# Patient Record
Sex: MALE | Race: WHITE | Employment: OTHER | URBAN - METROPOLITAN AREA
[De-identification: names, ages, dates, MRNs, and addresses within clinical notes are randomized per-mention and may not be internally consistent; named-entity substitution may affect disease eponyms.]

---

## 2018-08-19 ENCOUNTER — HOSPITAL ENCOUNTER (INPATIENT)
Age: 63
LOS: 5 days | Discharge: HOME HEALTH | DRG: 380 | End: 2018-08-24
Attending: EMERGENCY MEDICINE | Admitting: INTERNAL MEDICINE
Payer: MEDICARE

## 2018-08-19 ENCOUNTER — APPOINTMENT (OUTPATIENT)
Dept: ULTRASOUND IMAGING | Age: 63
DRG: 380 | End: 2018-08-19
Attending: EMERGENCY MEDICINE
Payer: MEDICARE

## 2018-08-19 ENCOUNTER — APPOINTMENT (OUTPATIENT)
Dept: GENERAL RADIOLOGY | Age: 63
DRG: 380 | End: 2018-08-19
Attending: EMERGENCY MEDICINE
Payer: MEDICARE

## 2018-08-19 DIAGNOSIS — E86.0 DEHYDRATION: ICD-10-CM

## 2018-08-19 DIAGNOSIS — R11.10 ACUTE VOMITING: ICD-10-CM

## 2018-08-19 DIAGNOSIS — F19.10 POLYSUBSTANCE ABUSE (HCC): ICD-10-CM

## 2018-08-19 DIAGNOSIS — G93.41 ACUTE METABOLIC ENCEPHALOPATHY: ICD-10-CM

## 2018-08-19 DIAGNOSIS — E87.6 ACUTE HYPOKALEMIA: ICD-10-CM

## 2018-08-19 DIAGNOSIS — F10.930 ALCOHOL WITHDRAWAL SYNDROME WITHOUT COMPLICATION (HCC): Primary | ICD-10-CM

## 2018-08-19 DIAGNOSIS — E87.1 ACUTE HYPONATREMIA: ICD-10-CM

## 2018-08-19 PROBLEM — K92.2 GIB (GASTROINTESTINAL BLEEDING): Status: ACTIVE | Noted: 2018-08-19

## 2018-08-19 PROBLEM — R65.10 SIRS (SYSTEMIC INFLAMMATORY RESPONSE SYNDROME) (HCC): Status: ACTIVE | Noted: 2018-08-19

## 2018-08-19 LAB
ALBUMIN SERPL-MCNC: 3.9 G/DL (ref 3.5–5)
ALBUMIN/GLOB SERPL: 0.9 {RATIO} (ref 1.1–2.2)
ALP SERPL-CCNC: 91 U/L (ref 45–117)
ALT SERPL-CCNC: 31 U/L (ref 12–78)
AMPHET UR QL SCN: NEGATIVE
ANION GAP SERPL CALC-SCNC: 9 MMOL/L (ref 5–15)
APPEARANCE UR: CLEAR
AST SERPL-CCNC: 37 U/L (ref 15–37)
BACTERIA URNS QL MICRO: NEGATIVE /HPF
BARBITURATES UR QL SCN: NEGATIVE
BASOPHILS # BLD: 0 K/UL (ref 0–0.1)
BASOPHILS NFR BLD: 0 % (ref 0–1)
BENZODIAZ UR QL: NEGATIVE
BILIRUB SERPL-MCNC: 1.8 MG/DL (ref 0.2–1)
BILIRUB UR QL CFM: NEGATIVE
BUN SERPL-MCNC: 14 MG/DL (ref 6–20)
BUN/CREAT SERPL: 19 (ref 12–20)
CALCIUM SERPL-MCNC: 9.1 MG/DL (ref 8.5–10.1)
CANNABINOIDS UR QL SCN: POSITIVE
CHLORIDE SERPL-SCNC: 93 MMOL/L (ref 97–108)
CK MB CFR SERPL CALC: 1 % (ref 0–2.5)
CK MB SERPL-MCNC: 1.3 NG/ML (ref 5–25)
CK SERPL-CCNC: 127 U/L (ref 39–308)
CO2 SERPL-SCNC: 29 MMOL/L (ref 21–32)
COCAINE UR QL SCN: NEGATIVE
COLOR UR: ABNORMAL
CREAT SERPL-MCNC: 0.75 MG/DL (ref 0.7–1.3)
DIFFERENTIAL METHOD BLD: ABNORMAL
DRUG SCRN COMMENT,DRGCM: ABNORMAL
EOSINOPHIL # BLD: 0 K/UL (ref 0–0.4)
EOSINOPHIL NFR BLD: 0 % (ref 0–7)
EPITH CASTS URNS QL MICRO: ABNORMAL /LPF
ERYTHROCYTE [DISTWIDTH] IN BLOOD BY AUTOMATED COUNT: 11.8 % (ref 11.5–14.5)
ETHANOL SERPL-MCNC: <10 MG/DL
GLOBULIN SER CALC-MCNC: 4.2 G/DL (ref 2–4)
GLUCOSE SERPL-MCNC: 115 MG/DL (ref 65–100)
GLUCOSE UR STRIP.AUTO-MCNC: NEGATIVE MG/DL
HCT VFR BLD AUTO: 43.5 % (ref 36.6–50.3)
HGB BLD-MCNC: 15.9 G/DL (ref 12.1–17)
HGB UR QL STRIP: NEGATIVE
IMM GRANULOCYTES # BLD: 0.1 K/UL (ref 0–0.04)
IMM GRANULOCYTES NFR BLD AUTO: 1 % (ref 0–0.5)
KETONES UR QL STRIP.AUTO: 40 MG/DL
LACTATE SERPL-SCNC: 1 MMOL/L (ref 0.4–2)
LEUKOCYTE ESTERASE UR QL STRIP.AUTO: ABNORMAL
LIPASE SERPL-CCNC: 129 U/L (ref 73–393)
LYMPHOCYTES # BLD: 1 K/UL (ref 0.8–3.5)
LYMPHOCYTES NFR BLD: 6 % (ref 12–49)
MAGNESIUM SERPL-MCNC: 1.9 MG/DL (ref 1.6–2.4)
MCH RBC QN AUTO: 34.6 PG (ref 26–34)
MCHC RBC AUTO-ENTMCNC: 36.6 G/DL (ref 30–36.5)
MCV RBC AUTO: 94.6 FL (ref 80–99)
METHADONE UR QL: NEGATIVE
MONOCYTES # BLD: 1 K/UL (ref 0–1)
MONOCYTES NFR BLD: 6 % (ref 5–13)
NEUTS SEG # BLD: 14 K/UL (ref 1.8–8)
NEUTS SEG NFR BLD: 87 % (ref 32–75)
NITRITE UR QL STRIP.AUTO: NEGATIVE
NRBC # BLD: 0 K/UL (ref 0–0.01)
NRBC BLD-RTO: 0 PER 100 WBC
OPIATES UR QL: NEGATIVE
PCP UR QL: NEGATIVE
PH UR STRIP: 6 [PH] (ref 5–8)
PLATELET # BLD AUTO: 283 K/UL (ref 150–400)
PMV BLD AUTO: 10.2 FL (ref 8.9–12.9)
POTASSIUM SERPL-SCNC: 3 MMOL/L (ref 3.5–5.1)
PROT SERPL-MCNC: 8.1 G/DL (ref 6.4–8.2)
PROT UR STRIP-MCNC: 100 MG/DL
RBC # BLD AUTO: 4.6 M/UL (ref 4.1–5.7)
RBC #/AREA URNS HPF: ABNORMAL /HPF (ref 0–5)
SODIUM SERPL-SCNC: 131 MMOL/L (ref 136–145)
SP GR UR REFRACTOMETRY: 1.03 (ref 1–1.03)
TROPONIN I SERPL-MCNC: <0.05 NG/ML
UA: UC IF INDICATED,UAUC: ABNORMAL
UROBILINOGEN UR QL STRIP.AUTO: 1 EU/DL (ref 0.2–1)
WBC # BLD AUTO: 16.1 K/UL (ref 4.1–11.1)
WBC URNS QL MICRO: ABNORMAL /HPF (ref 0–4)

## 2018-08-19 PROCEDURE — 83690 ASSAY OF LIPASE: CPT | Performed by: EMERGENCY MEDICINE

## 2018-08-19 PROCEDURE — 74011000250 HC RX REV CODE- 250: Performed by: EMERGENCY MEDICINE

## 2018-08-19 PROCEDURE — 81001 URINALYSIS AUTO W/SCOPE: CPT | Performed by: EMERGENCY MEDICINE

## 2018-08-19 PROCEDURE — 74011250636 HC RX REV CODE- 250/636: Performed by: INTERNAL MEDICINE

## 2018-08-19 PROCEDURE — 74011250637 HC RX REV CODE- 250/637: Performed by: EMERGENCY MEDICINE

## 2018-08-19 PROCEDURE — 96375 TX/PRO/DX INJ NEW DRUG ADDON: CPT

## 2018-08-19 PROCEDURE — 87040 BLOOD CULTURE FOR BACTERIA: CPT | Performed by: INTERNAL MEDICINE

## 2018-08-19 PROCEDURE — 80307 DRUG TEST PRSMV CHEM ANLYZR: CPT | Performed by: EMERGENCY MEDICINE

## 2018-08-19 PROCEDURE — 96374 THER/PROPH/DIAG INJ IV PUSH: CPT

## 2018-08-19 PROCEDURE — 83605 ASSAY OF LACTIC ACID: CPT | Performed by: INTERNAL MEDICINE

## 2018-08-19 PROCEDURE — C9113 INJ PANTOPRAZOLE SODIUM, VIA: HCPCS | Performed by: INTERNAL MEDICINE

## 2018-08-19 PROCEDURE — 94761 N-INVAS EAR/PLS OXIMETRY MLT: CPT

## 2018-08-19 PROCEDURE — 99285 EMERGENCY DEPT VISIT HI MDM: CPT

## 2018-08-19 PROCEDURE — 65270000029 HC RM PRIVATE

## 2018-08-19 PROCEDURE — 36415 COLL VENOUS BLD VENIPUNCTURE: CPT | Performed by: EMERGENCY MEDICINE

## 2018-08-19 PROCEDURE — 74022 RADEX COMPL AQT ABD SERIES: CPT

## 2018-08-19 PROCEDURE — 96361 HYDRATE IV INFUSION ADD-ON: CPT

## 2018-08-19 PROCEDURE — 84484 ASSAY OF TROPONIN QUANT: CPT | Performed by: EMERGENCY MEDICINE

## 2018-08-19 PROCEDURE — 83735 ASSAY OF MAGNESIUM: CPT | Performed by: EMERGENCY MEDICINE

## 2018-08-19 PROCEDURE — 74011250636 HC RX REV CODE- 250/636: Performed by: EMERGENCY MEDICINE

## 2018-08-19 PROCEDURE — 82550 ASSAY OF CK (CPK): CPT | Performed by: EMERGENCY MEDICINE

## 2018-08-19 PROCEDURE — 76705 ECHO EXAM OF ABDOMEN: CPT

## 2018-08-19 PROCEDURE — 80053 COMPREHEN METABOLIC PANEL: CPT | Performed by: EMERGENCY MEDICINE

## 2018-08-19 PROCEDURE — 85025 COMPLETE CBC W/AUTO DIFF WBC: CPT | Performed by: EMERGENCY MEDICINE

## 2018-08-19 PROCEDURE — 93005 ELECTROCARDIOGRAM TRACING: CPT

## 2018-08-19 PROCEDURE — C9113 INJ PANTOPRAZOLE SODIUM, VIA: HCPCS | Performed by: EMERGENCY MEDICINE

## 2018-08-19 RX ORDER — ACETAMINOPHEN 325 MG/1
650 TABLET ORAL
Status: DISCONTINUED | OUTPATIENT
Start: 2018-08-19 | End: 2018-08-24 | Stop reason: HOSPADM

## 2018-08-19 RX ORDER — ONDANSETRON 2 MG/ML
4 INJECTION INTRAMUSCULAR; INTRAVENOUS
Status: DISCONTINUED | OUTPATIENT
Start: 2018-08-19 | End: 2018-08-24 | Stop reason: HOSPADM

## 2018-08-19 RX ORDER — LIDOCAINE HYDROCHLORIDE 20 MG/ML
15 SOLUTION OROPHARYNGEAL
Status: COMPLETED | OUTPATIENT
Start: 2018-08-19 | End: 2018-08-19

## 2018-08-19 RX ORDER — SODIUM CHLORIDE 0.9 % (FLUSH) 0.9 %
5-10 SYRINGE (ML) INJECTION AS NEEDED
Status: DISCONTINUED | OUTPATIENT
Start: 2018-08-19 | End: 2018-08-24 | Stop reason: HOSPADM

## 2018-08-19 RX ORDER — ONDANSETRON 2 MG/ML
4 INJECTION INTRAMUSCULAR; INTRAVENOUS
Status: COMPLETED | OUTPATIENT
Start: 2018-08-19 | End: 2018-08-19

## 2018-08-19 RX ORDER — SODIUM CHLORIDE 0.9 % (FLUSH) 0.9 %
5-10 SYRINGE (ML) INJECTION EVERY 8 HOURS
Status: DISCONTINUED | OUTPATIENT
Start: 2018-08-19 | End: 2018-08-24 | Stop reason: HOSPADM

## 2018-08-19 RX ORDER — LORAZEPAM 2 MG/ML
2 INJECTION INTRAMUSCULAR
Status: DISCONTINUED | OUTPATIENT
Start: 2018-08-19 | End: 2018-08-24 | Stop reason: HOSPADM

## 2018-08-19 RX ORDER — NICOTINE 7MG/24HR
1 PATCH, TRANSDERMAL 24 HOURS TRANSDERMAL
Status: DISCONTINUED | OUTPATIENT
Start: 2018-08-19 | End: 2018-08-24 | Stop reason: HOSPADM

## 2018-08-19 RX ORDER — LORAZEPAM 2 MG/ML
4 INJECTION INTRAMUSCULAR
Status: DISCONTINUED | OUTPATIENT
Start: 2018-08-19 | End: 2018-08-24 | Stop reason: HOSPADM

## 2018-08-19 RX ORDER — SODIUM CHLORIDE, SODIUM LACTATE, POTASSIUM CHLORIDE, CALCIUM CHLORIDE 600; 310; 30; 20 MG/100ML; MG/100ML; MG/100ML; MG/100ML
100 INJECTION, SOLUTION INTRAVENOUS CONTINUOUS
Status: DISCONTINUED | OUTPATIENT
Start: 2018-08-19 | End: 2018-08-19

## 2018-08-19 RX ORDER — LORAZEPAM 2 MG/ML
1 INJECTION INTRAMUSCULAR ONCE
Status: COMPLETED | OUTPATIENT
Start: 2018-08-19 | End: 2018-08-19

## 2018-08-19 RX ORDER — POTASSIUM CHLORIDE AND SODIUM CHLORIDE 900; 300 MG/100ML; MG/100ML
INJECTION, SOLUTION INTRAVENOUS CONTINUOUS
Status: DISCONTINUED | OUTPATIENT
Start: 2018-08-19 | End: 2018-08-23

## 2018-08-19 RX ADMIN — LORAZEPAM 2 MG: 2 INJECTION INTRAMUSCULAR; INTRAVENOUS at 23:01

## 2018-08-19 RX ADMIN — LORAZEPAM 2 MG: 2 INJECTION INTRAMUSCULAR; INTRAVENOUS at 21:36

## 2018-08-19 RX ADMIN — LORAZEPAM 1 MG: 2 INJECTION INTRAMUSCULAR; INTRAVENOUS at 16:36

## 2018-08-19 RX ADMIN — ONDANSETRON 4 MG: 2 INJECTION, SOLUTION INTRAMUSCULAR; INTRAVENOUS at 21:36

## 2018-08-19 RX ADMIN — FOLIC ACID: 5 INJECTION, SOLUTION INTRAMUSCULAR; INTRAVENOUS; SUBCUTANEOUS at 19:36

## 2018-08-19 RX ADMIN — PHENOL 1 SPRAY: 1.4 LIQUID ORAL at 16:44

## 2018-08-19 RX ADMIN — SODIUM CHLORIDE 40 MG: 9 INJECTION INTRAMUSCULAR; INTRAVENOUS; SUBCUTANEOUS at 16:39

## 2018-08-19 RX ADMIN — ONDANSETRON 4 MG: 2 INJECTION, SOLUTION INTRAMUSCULAR; INTRAVENOUS at 14:51

## 2018-08-19 RX ADMIN — SODIUM CHLORIDE AND POTASSIUM CHLORIDE: 9; 2.98 INJECTION, SOLUTION INTRAVENOUS at 19:44

## 2018-08-19 RX ADMIN — SODIUM CHLORIDE 1000 ML: 900 INJECTION, SOLUTION INTRAVENOUS at 14:46

## 2018-08-19 RX ADMIN — LIDOCAINE HYDROCHLORIDE 15 ML: 20 SOLUTION ORAL; TOPICAL at 15:55

## 2018-08-19 RX ADMIN — Medication 10 ML: at 21:36

## 2018-08-19 RX ADMIN — SODIUM CHLORIDE 40 MG: 9 INJECTION, SOLUTION INTRAMUSCULAR; INTRAVENOUS; SUBCUTANEOUS at 21:35

## 2018-08-19 RX ADMIN — LORAZEPAM 2 MG: 2 INJECTION INTRAMUSCULAR; INTRAVENOUS at 19:51

## 2018-08-19 RX ADMIN — Medication 10 ML: at 23:01

## 2018-08-19 NOTE — IP AVS SNAPSHOT
[address] [facility] 
[phone] Patient: Wayne Montaño Sr. MRN: [id number] JHOAN:[birth year] About your hospitalization You were admitted on:  August 19, 2018 You last received care in the:  [facility] 3 NEUROSCIENCE TELEMETRY You were discharged on:  August 24, 2018 Why you were hospitalized Your primary diagnosis was:  Gib (Gastrointestinal Bleeding) Your diagnoses also included:  Vomiting, Sirs (Systemic Inflammatory Response Syndrome) (Hcc) Follow-up Information Follow up With Details Comments Contact Info Madeline Duckworth, DO Go on 8/28/2018 Please follow up on August 28, 2018 at [address] [address] [place] [address] 
[phone] [facility]   home health nursing, PT and OT services  [phone] [facility] Office on [facility] [address] 
[phone] Sari Mercado MD Schedule an appointment as soon as possible for a visit 2 weeks [address] 
[phone] Discharge Orders None A check josiah indicates which time of day the medication should be taken. My Medications START taking these medications Instructions Each Dose to Equal  
 Morning Noon Evening Bedtime  
 nicotine 7 mg/24 hr  
Commonly known as:  Azell Sabins Your last dose was: Your next dose is:    
   
   
 1 Patch by TransDERmal route daily as needed for Other (nicotine withdrawal) for up to 30 days. 1 Patch  
    
   
   
   
  
 pantoprazole 40 mg tablet Commonly known as:  PROTONIX Your last dose was: Your next dose is: Take 1 Tab by mouth Before breakfast and dinner. 40 mg Thiamine Mononitrate 100 mg tablet Commonly known as:  B-1 Start taking on:  8/25/2018 Your last dose was: Your next dose is: Take 1 Tab by mouth daily. 100 mg CONTINUE taking these medications Instructions Each Dose to Equal  
 Morning Noon Evening Bedtime  
 multivitamin with iron tablet Your last dose was: Your next dose is: Take 1 Tab by mouth daily. 1 Tab Where to Get Your Medications Information on where to get these meds will be given to you by the nurse or doctor. ! Ask your nurse or doctor about these medications  
  nicotine 7 mg/24 hr  
 pantoprazole 40 mg tablet Thiamine Mononitrate 100 mg tablet Discharge Instructions Patient Discharge Instructions Pt Name  Ubaldo Steven Date of Birth 1955 Age  70 y.o. Medical Record Number  [id number] PCP Jen Wesley,  Admit date:  8/19/2018 @    [place] [address] Room Number  3120/01 Date of Discharge 8/24/2018 Admission Diagnoses:     GIB (gastrointestinal bleeding) Allergies Allergen Reactions  Codeine Other (comments) Cant remember reaction You were admitted to [address] for  GIB (gastrointestinal bleeding) YOUR OTHER MEDICAL DIAGNOSES INCLUDE (BUT NOT LIMITED TO ): 
Present on Admission: **None** DIET:  Low fat, Low cholesterol, soft diet Recommended activity: Activity as tolerated Follow up : Follow-up Information Follow up With Details Comments Contact Info Anish Avendaño,  Schedule an appointment as soon as possible for a visit within one week [address] [address] 
[phone] DO NOT DRINK AND DO NOT SMOKE!! Perez's Esophagus: Care Instructions Your Care Instructions The esophagus is the tube that connects the throat to the stomach. Food and liquids go through this tube. In Perez's esophagus, the cells that line the tube change.  This is usually because of gastroesophageal reflux disease (GERD). GERD causes acid from your stomach to back up into the esophagus. When you have Perez's esophagus, you are slightly more likely to get cancer of the esophagus. So regular testing is important to watch for signs of this cancer. You can treat GERD to control your symptoms and feel better. Follow-up care is a key part of your treatment and safety. Be sure to make and go to all appointments, and call your doctor if you are having problems. It's also a good idea to know your test results and keep a list of the medicines you take. How can you care for yourself at home? · Take your medicines exactly as prescribed. Call your doctor if you think you are having a problem with your medicine. · If you take over-the-counter medicine, such as antacids or acid reducers, follow all instructions on the label. If you use these medicines often, talk with your doctor. Be careful when you take over-the-counter antacid medicines. Many of these medicines have aspirin in them. Read the label to make sure that you are not taking more than the recommended dose. Too much aspirin can be harmful. · Do not smoke or chew tobacco. Smoking can make GERD worse. If you need help quitting, talk to your doctor about stop-smoking programs and medicines. These can increase your chances of quitting for good. · Chocolate, mint, and alcohol can make GERD worse. They relax the valve between the esophagus and the stomach. · Spicy foods, foods that have a lot of acid (like tomatoes and oranges), and coffee can make GERD symptoms worse in some people. If your symptoms are worse after you eat a certain food, you may want to stop eating that food to see if your symptoms get better. · Eat smaller meals, and more often. After eating, wait 2 to 3 hours before you lie down.  
· Raise the head of your bed 6 to 8 inches by putting blocks under the frame or a foam wedge under the head of the mattress. · Do not wear tight clothing around your midsection. · If you are overweight, lose weight. Even losing 5 to 10 pounds can help. When should you call for help? Call your doctor now or seek immediate medical care if: 
  · You have new or worse belly pain.  
  · You are vomiting.  
 Watch closely for changes in your health, and be sure to contact your doctor if: 
  · You have any pain or difficulty swallowing.  
  · You are losing weight.  
  · You have new or worse symptoms, such as heartburn.  
  · You do not get better as expected. Where can you learn more? Go to http://patricio-namrata.info/. Enter L146 in the search box to learn more about \"Perez's Esophagus: Care Instructions. \" Current as of: May 12, 2017 Content Version: 11.7 © 6262-7880 [employer]. Care instructions adapted under license by [employer] (which disclaims liability or warranty for this information). If you have questions about a medical condition or this instruction, always ask your healthcare professional. [address] any warranty or liability for your use of this information. · It is important that you take the medication exactly as they are prescribed. · Keep your medication in the bottles provided by the pharmacist and keep a list of the medication names, dosages, and times to be taken in your wallet. · Do not take other medications without consulting your doctor. ADDITIONAL INFORMATION: If you experience any of the following symptoms or have any health problem not listed below, then please call your primary care physician or return to the emergency room if you cannot get hold of your doctor: Fever, chills, nausea, vomiting, diarrhea, change in mentation, falling, bleeding, shortness of breath.  
 
 
I understand that if any problems occur once I am discharged, I am supposed to call my Primary care physician for further care or seek help in the Emergency Department at the nearest Healthcare facility. I have had an opportunity to discuss my clinical issues with my doctor and nursing staff. I understand and acknowledge receipt of the above instructions. Physician's or R.N.'s Signature                                                            Date/Time Patient or Representative Signature                                                 Date/Time [employer] Announcement We are excited to announce that we are making your provider's discharge notes available to you in GIVINGtrax. You will see these notes when they are completed and signed by the physician that discharged you from your recent hospital stay. If you have any questions or concerns about any information you see in GIVINGtrax, please call the Health Information Department where you were seen or reach out to your Primary Care Provider for more information about your plan of care. Introducing [facility]! [facility] [facility] introduces GIVINGtrax patient portal. Now you can access parts of your medical record, email your doctor's office, and request medication refills online. 1. In your internet browser, go to [email or web address] 2. Click on the First Time User? Click Here link in the Sign In box. You will see the New Member Sign Up page. 3. Enter your GIVING[employer]x Access Code exactly as it appears below. You will not need to use this code after youve completed the sign-up process. If you do not sign up before the expiration date, you must request a new code. · [employer] Access Code: [email or web address] Expires: 11/17/2018  2:19 PM 
 
4. Enter the last four digits of your Social Security Number (xxxx) and Date of Birth (mm/dd/yyyy) as indicated and click Submit. You will be taken to the next sign-up page. 5. Create a Ellevation ID. This will be your [employer] login ID and cannot be changed, so think of one that is secure and easy to remember. 6. Create a [employer] password. You can change your password at any time. 7. Enter your Password Reset Question and Answer. This can be used at a later time if you forget your password. 8. Enter your e-mail address. You will receive e-mail notification when new information is available in [address] 9. Click Sign Up. You can now view and download portions of your medical record. 10. Click the Download Summary menu link to download a portable copy of your medical information. If you have questions, please visit the Frequently Asked Questions section of the [employer] website. Remember, [employer] is NOT to be used for urgent needs. For medical emergencies, dial 911. Now available from your iPhone and Android! Introducing Nii Morocho As a [facility] patient, I wanted to make you aware of our electronic visit tool called Nii Morocho. [facility] 24/7 allows you to connect within minutes with a medical provider 24 hours a day, seven days a week via a mobile device or tablet or logging into a secure website from your computer. You can access Nii Morocho from anywhere in the [place]. A virtual visit might be right for you when you have a simple condition and feel like you just dont want to get out of bed, or cant get away from work for an appointment, when your regular [facility] provider is not available (evenings, weekends or holidays), or when youre out of town and need minor care.   Electronic visits cost only $49 and if the [facility] 24/7 provider determines a prescription is needed to treat your condition, one can be electronically transmitted to a nearby pharmacy*. Please take a moment to enroll today if you have not already done so. The enrollment process is free and takes just a few minutes. To enroll, please download the [employer] [facility] 24/7 luanne to your tablet or phone, or visit [email or web address] to enroll on your computer. And, as an [address] [place] [address] patient with a [employer] account, the results of your visits will be scanned into your electronic medical record and your primary care provider will be able to view the scanned results. We urge you to continue to see your regular [employer] provider for your ongoing medical care. And while your primary care provider may not be the one available when you seek a [employer] virtual visit, the peace of mind you get from getting a real diagnosis real time can be priceless. For more information on [employer], view our Frequently Asked Questions (FAQs) at [email or web address]. Sincerely, 
 
Breann Salazar MD 
Chief Medical Officer [address] [place] [address] *:  certain medications cannot be prescribed via [employer] Providers Seen During Your Hospitalization Provider Specialty Primary office phone Holli Kwong MD Emergency Medicine [phone] May Campbell MD Hospitalist [phone] Rylee Weir MD Hospitalist [phone] Your Primary Care Physician (PCP) Primary Care Physician Office Phone Office Fax Edu Alvarado [phone] You are allergic to the following Allergen Reactions Codeine Other (comments) Cant remember reaction Recent Documentation Height Weight BMI Smoking Status 1.778 m 74.6 kg 23.6 kg/m2 Former Smoker Emergency Contacts Name Discharge Info Relation Home Work Mobile Neri Snatos CAREGIVER [3] Friend [5] [phone] Pankaj Fall DISCHARGE CAREGIVER [3] Friend [5] [phone] Patient Belongings The following personal items are in your possession at time of discharge: 
  Dental Appliances:  (not brought to endoscopy with patient)  Visual Aid: None      Home Medications: None   Jewelry: None  Clothing: Sent home    Other Valuables: Sent home Please provide this summary of care documentation to your next provider. Signatures-by signing, you are acknowledging that this After Visit Summary has been reviewed with you and you have received a copy. Patient Signature:  ____________________________________________________________ Date:  ____________________________________________________________  
  
[facility] Provider Signature:  ____________________________________________________________ Date:  ____________________________________________________________

## 2018-08-19 NOTE — IP AVS SNAPSHOT
[address] [facility] 
[phone] Patient: Juan Gimenez Sr. MRN: [id number] GYE:[birth year] A check josiah indicates which time of day the medication should be taken. My Medications START taking these medications Instructions Each Dose to Equal  
 Morning Noon Evening Bedtime  
 nicotine 7 mg/24 hr  
Commonly known as:  Valdez Kilgore Your last dose was: Your next dose is:    
   
   
 1 Patch by TransDERmal route daily as needed for Other (nicotine withdrawal) for up to 30 days. 1 Patch  
    
   
   
   
  
 pantoprazole 40 mg tablet Commonly known as:  PROTONIX Your last dose was: Your next dose is: Take 1 Tab by mouth Before breakfast and dinner. 40 mg Thiamine Mononitrate 100 mg tablet Commonly known as:  B-1 Start taking on:  8/25/2018 Your last dose was: Your next dose is: Take 1 Tab by mouth daily. 100 mg CONTINUE taking these medications Instructions Each Dose to Equal  
 Morning Noon Evening Bedtime  
 multivitamin with iron tablet Your last dose was: Your next dose is: Take 1 Tab by mouth daily. 1 Tab Where to Get Your Medications Information on where to get these meds will be given to you by the nurse or doctor. ! Ask your nurse or doctor about these medications  
  nicotine 7 mg/24 hr  
 pantoprazole 40 mg tablet Thiamine Mononitrate 100 mg tablet
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
## 2018-08-19 NOTE — ED NOTES
Patient provided ice chips and requesting throat spray, called Dr Deion Elias and requested, iv fluids almost completed
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
## 2018-08-19 NOTE — ED NOTES
TRANSFER - IN REPORT:    Verbal report received from MICHAEL Salgado(name) on [employer] .  Report consisted of patients Situation, Background, Assessment and   Recommendations(SBAR). Information from the following report(s) SBAR and ED Summary was reviewed with the receiving nurse. Opportunity for questions and clarification was provided.

## 2018-08-19 NOTE — ED NOTES
Bedside shift change report given to Thor Brunner RN  (oncoming nurse) by Carlos Lyman RN  (offgoing nurse). Report included the following information SBAR, Kardex and ED Summary.

## 2018-08-20 LAB
ALBUMIN SERPL-MCNC: 2.7 G/DL (ref 3.5–5)
ALBUMIN/GLOB SERPL: 0.8 {RATIO} (ref 1.1–2.2)
ALP SERPL-CCNC: 63 U/L (ref 45–117)
ALT SERPL-CCNC: 26 U/L (ref 12–78)
ANION GAP SERPL CALC-SCNC: 7 MMOL/L (ref 5–15)
AST SERPL-CCNC: 46 U/L (ref 15–37)
ATRIAL RATE: 108 BPM
BASOPHILS # BLD: 0 K/UL (ref 0–0.1)
BASOPHILS NFR BLD: 0 % (ref 0–1)
BILIRUB SERPL-MCNC: 1.7 MG/DL (ref 0.2–1)
BUN SERPL-MCNC: 8 MG/DL (ref 6–20)
BUN/CREAT SERPL: 13 (ref 12–20)
CALCIUM SERPL-MCNC: 7.9 MG/DL (ref 8.5–10.1)
CALCULATED P AXIS, ECG09: 35 DEGREES
CALCULATED R AXIS, ECG10: -15 DEGREES
CALCULATED T AXIS, ECG11: 52 DEGREES
CHLORIDE SERPL-SCNC: 103 MMOL/L (ref 97–108)
CO2 SERPL-SCNC: 26 MMOL/L (ref 21–32)
CORTIS AM PEAK SERPL-MCNC: 16.9 UG/DL (ref 4.3–22.4)
CREAT SERPL-MCNC: 0.61 MG/DL (ref 0.7–1.3)
DIAGNOSIS, 93000: NORMAL
DIFFERENTIAL METHOD BLD: ABNORMAL
EOSINOPHIL # BLD: 0 K/UL (ref 0–0.4)
EOSINOPHIL NFR BLD: 0 % (ref 0–7)
ERYTHROCYTE [DISTWIDTH] IN BLOOD BY AUTOMATED COUNT: 11.9 % (ref 11.5–14.5)
EST. AVERAGE GLUCOSE BLD GHB EST-MCNC: NORMAL MG/DL
GLOBULIN SER CALC-MCNC: 3.5 G/DL (ref 2–4)
GLUCOSE SERPL-MCNC: 91 MG/DL (ref 65–100)
HBA1C MFR BLD: 4.4 % (ref 4.2–6.3)
HCT VFR BLD AUTO: 36.2 % (ref 36.6–50.3)
HGB BLD-MCNC: 12.9 G/DL (ref 12.1–17)
IMM GRANULOCYTES # BLD: 0 K/UL (ref 0–0.04)
IMM GRANULOCYTES NFR BLD AUTO: 0 % (ref 0–0.5)
INR PPP: 1 (ref 0.9–1.1)
LYMPHOCYTES # BLD: 1 K/UL (ref 0.8–3.5)
LYMPHOCYTES NFR BLD: 15 % (ref 12–49)
MAGNESIUM SERPL-MCNC: 1.9 MG/DL (ref 1.6–2.4)
MCH RBC QN AUTO: 34.7 PG (ref 26–34)
MCHC RBC AUTO-ENTMCNC: 35.6 G/DL (ref 30–36.5)
MCV RBC AUTO: 97.3 FL (ref 80–99)
MONOCYTES # BLD: 0.4 K/UL (ref 0–1)
MONOCYTES NFR BLD: 6 % (ref 5–13)
NEUTS SEG # BLD: 5.5 K/UL (ref 1.8–8)
NEUTS SEG NFR BLD: 79 % (ref 32–75)
NRBC # BLD: 0 K/UL (ref 0–0.01)
NRBC BLD-RTO: 0 PER 100 WBC
P-R INTERVAL, ECG05: 162 MS
PHOSPHATE SERPL-MCNC: 1.4 MG/DL (ref 2.6–4.7)
PLATELET # BLD AUTO: 153 K/UL (ref 150–400)
PMV BLD AUTO: 10.1 FL (ref 8.9–12.9)
POTASSIUM SERPL-SCNC: 3.9 MMOL/L (ref 3.5–5.1)
PROT SERPL-MCNC: 6.2 G/DL (ref 6.4–8.2)
PROTHROMBIN TIME: 10.3 SEC (ref 9–11.1)
Q-T INTERVAL, ECG07: 348 MS
QRS DURATION, ECG06: 80 MS
QTC CALCULATION (BEZET), ECG08: 466 MS
RBC # BLD AUTO: 3.72 M/UL (ref 4.1–5.7)
SODIUM SERPL-SCNC: 136 MMOL/L (ref 136–145)
T4 FREE SERPL-MCNC: 0.8 NG/DL (ref 0.8–1.5)
TSH SERPL DL<=0.05 MIU/L-ACNC: 1.9 UIU/ML (ref 0.36–3.74)
VENTRICULAR RATE, ECG03: 108 BPM
VIT B12 SERPL-MCNC: NORMAL PG/ML (ref 193–986)
WBC # BLD AUTO: 6.9 K/UL (ref 4.1–11.1)

## 2018-08-20 PROCEDURE — 74011250637 HC RX REV CODE- 250/637: Performed by: INTERNAL MEDICINE

## 2018-08-20 PROCEDURE — 80053 COMPREHEN METABOLIC PANEL: CPT | Performed by: INTERNAL MEDICINE

## 2018-08-20 PROCEDURE — 83036 HEMOGLOBIN GLYCOSYLATED A1C: CPT | Performed by: INTERNAL MEDICINE

## 2018-08-20 PROCEDURE — 74011000250 HC RX REV CODE- 250: Performed by: INTERNAL MEDICINE

## 2018-08-20 PROCEDURE — 82607 VITAMIN B-12: CPT | Performed by: INTERNAL MEDICINE

## 2018-08-20 PROCEDURE — 84439 ASSAY OF FREE THYROXINE: CPT | Performed by: INTERNAL MEDICINE

## 2018-08-20 PROCEDURE — 83735 ASSAY OF MAGNESIUM: CPT | Performed by: INTERNAL MEDICINE

## 2018-08-20 PROCEDURE — 84100 ASSAY OF PHOSPHORUS: CPT | Performed by: INTERNAL MEDICINE

## 2018-08-20 PROCEDURE — 36415 COLL VENOUS BLD VENIPUNCTURE: CPT | Performed by: INTERNAL MEDICINE

## 2018-08-20 PROCEDURE — 94760 N-INVAS EAR/PLS OXIMETRY 1: CPT

## 2018-08-20 PROCEDURE — 74011250636 HC RX REV CODE- 250/636: Performed by: INTERNAL MEDICINE

## 2018-08-20 PROCEDURE — 84443 ASSAY THYROID STIM HORMONE: CPT | Performed by: INTERNAL MEDICINE

## 2018-08-20 PROCEDURE — 77030013160 HC PROTCT ARM THMB DERY -A

## 2018-08-20 PROCEDURE — 85025 COMPLETE CBC W/AUTO DIFF WBC: CPT | Performed by: INTERNAL MEDICINE

## 2018-08-20 PROCEDURE — C9113 INJ PANTOPRAZOLE SODIUM, VIA: HCPCS | Performed by: INTERNAL MEDICINE

## 2018-08-20 PROCEDURE — 85610 PROTHROMBIN TIME: CPT | Performed by: PHYSICIAN ASSISTANT

## 2018-08-20 PROCEDURE — 82533 TOTAL CORTISOL: CPT | Performed by: INTERNAL MEDICINE

## 2018-08-20 PROCEDURE — 65660000000 HC RM CCU STEPDOWN

## 2018-08-20 RX ORDER — HYDRALAZINE HYDROCHLORIDE 20 MG/ML
10 INJECTION INTRAMUSCULAR; INTRAVENOUS
Status: DISCONTINUED | OUTPATIENT
Start: 2018-08-20 | End: 2018-08-24 | Stop reason: HOSPADM

## 2018-08-20 RX ADMIN — LORAZEPAM 4 MG: 2 INJECTION INTRAMUSCULAR; INTRAVENOUS at 00:39

## 2018-08-20 RX ADMIN — LORAZEPAM 4 MG: 2 INJECTION INTRAMUSCULAR; INTRAVENOUS at 13:55

## 2018-08-20 RX ADMIN — LORAZEPAM 4 MG: 2 INJECTION INTRAMUSCULAR; INTRAVENOUS at 11:24

## 2018-08-20 RX ADMIN — Medication 10 ML: at 00:39

## 2018-08-20 RX ADMIN — Medication 10 ML: at 17:15

## 2018-08-20 RX ADMIN — SODIUM PHOSPHATE, MONOBASIC, MONOHYDRATE AND SODIUM PHOSPHATE, DIBASIC ANHYDROUS: 276; 142 INJECTION, SOLUTION INTRAVENOUS at 11:46

## 2018-08-20 RX ADMIN — LORAZEPAM 2 MG: 2 INJECTION INTRAMUSCULAR; INTRAVENOUS at 21:58

## 2018-08-20 RX ADMIN — Medication 10 ML: at 21:52

## 2018-08-20 RX ADMIN — FOLIC ACID: 5 INJECTION, SOLUTION INTRAMUSCULAR; INTRAVENOUS; SUBCUTANEOUS at 11:29

## 2018-08-20 RX ADMIN — LORAZEPAM 4 MG: 2 INJECTION INTRAMUSCULAR; INTRAVENOUS at 06:48

## 2018-08-20 RX ADMIN — SODIUM CHLORIDE AND POTASSIUM CHLORIDE: 9; 2.98 INJECTION, SOLUTION INTRAVENOUS at 21:48

## 2018-08-20 RX ADMIN — Medication 10 ML: at 13:52

## 2018-08-20 RX ADMIN — LORAZEPAM 4 MG: 2 INJECTION INTRAMUSCULAR; INTRAVENOUS at 19:09

## 2018-08-20 RX ADMIN — LORAZEPAM 4 MG: 2 INJECTION INTRAMUSCULAR; INTRAVENOUS at 19:10

## 2018-08-20 RX ADMIN — ACETAMINOPHEN 650 MG: 325 TABLET ORAL at 11:31

## 2018-08-20 RX ADMIN — LORAZEPAM 4 MG: 2 INJECTION INTRAMUSCULAR; INTRAVENOUS at 12:22

## 2018-08-20 RX ADMIN — SODIUM CHLORIDE 40 MG: 9 INJECTION, SOLUTION INTRAMUSCULAR; INTRAVENOUS; SUBCUTANEOUS at 08:27

## 2018-08-20 RX ADMIN — LORAZEPAM 4 MG: 2 INJECTION INTRAMUSCULAR; INTRAVENOUS at 17:14

## 2018-08-20 RX ADMIN — LORAZEPAM 4 MG: 2 INJECTION INTRAMUSCULAR; INTRAVENOUS at 23:17

## 2018-08-20 RX ADMIN — ONDANSETRON 4 MG: 2 INJECTION, SOLUTION INTRAMUSCULAR; INTRAVENOUS at 13:55

## 2018-08-20 RX ADMIN — LORAZEPAM 4 MG: 2 INJECTION INTRAMUSCULAR; INTRAVENOUS at 08:23

## 2018-08-20 RX ADMIN — SODIUM CHLORIDE AND POTASSIUM CHLORIDE: 9; 2.98 INJECTION, SOLUTION INTRAVENOUS at 03:32

## 2018-08-20 RX ADMIN — SODIUM CHLORIDE 40 MG: 9 INJECTION, SOLUTION INTRAMUSCULAR; INTRAVENOUS; SUBCUTANEOUS at 21:48

## 2018-08-20 RX ADMIN — LORAZEPAM 4 MG: 2 INJECTION INTRAMUSCULAR; INTRAVENOUS at 03:39

## 2018-08-20 RX ADMIN — LORAZEPAM 4 MG: 2 INJECTION INTRAMUSCULAR; INTRAVENOUS at 15:58

## 2018-08-20 NOTE — PROGRESS NOTES
0730 At time of bedside shift report patient having visible tremors, diaphoretic and disoriented x4. Slurred speech. Vitals signs stable with HR NSR 90's. CIWA score of 15. See MAR for intervention. 0900 Patient restless/anxious. Visible tremors. Diaphoretic. Disoriented x4. MD notified of increased need for 4mg Ativan. 0945 Patient resting quietly with eyes closed. Tremors in still visible. 1030 Patient increasingly anxious and agitated. Tremors visible while hands at rest. Diaphoretic. Slurred speech. Complaints of headache. CIWA 20. See MAR for intervention. MD paged for electrolyte imbalance - Phosphorous 1.4, K 3.9, Ca 7.9.     1200 Family members at bedside. Patient restless, tremerous, and diaphoretic. [address] [facility] See MAR for intervention.

## 2018-08-20 NOTE — PROGRESS NOTES
Patient getting out of bed by himself, disoriented, bed alarm on, patient placed on waiting list for telesitter

## 2018-08-20 NOTE — PROGRESS NOTES
Hospitalist Progress Note    NAME: Juan Gimenez Sr.   :  1955   MRN:  [id number]       Assessment / Plan:  Persistent Vomiting of Solids but patient able to tolerate Liquids and   -reported that \"feels like my throat is closing up\". Patient also reported occasional streaks of blood.  -H&H normal. No signs of active GI bleed. Cannot rule out at this time. -PPI  -GI consult appreciated. Dehydration - due to vomiting. Alcohol withdrawal  -requiring frequent activan  -CIWA  -Banana bag daily  -check Q20    Acute metabolic encephalopathy - due to alcohol withdrawal.    Alcohol abuse - has 6 pack daily.  -treatment as above  -Tox screen + for THC    Non-infectious SIRS - tachycardia and leukocytosis on admission  -WBC normalized. No signs of active infection. Leukocytosis likely reactive.   -lactic acid normal.  -follow cx. Hold abx for now. Hypokalemia:  -supplement w/ IV fluids    Hypophosphatemia  -supplement    Mild hyperglycemia  -A1c 4.4  -normal TSH, free T4  -AM cortisol 16.9     History of Rheumatic Fever and Polio     Tobacco Use:  -nicotine patch prn           Body mass index is 23.6 kg/(m^2). Code status: DNR  Prophylaxis: SCD's  Recommended Disposition: to be determined     Subjective:     Chief Complaint / Reason for Physician Visit  Follow up for vomiting. Discussed with RN events overnight. Does not offer any co    Review of Systems:  Symptom Y/N Comments  Symptom Y/N Comments   Fever/Chills    Chest Pain     Poor Appetite    Edema     Cough    Abdominal Pain     Sputum    Joint Pain     SOB/SPAULDING    Pruritis/Rash     Nausea/vomit    Tolerating PT/OT     Diarrhea    Tolerating Diet     Constipation    Other       Could NOT obtain due to: AMS     Objective:     VITALS:   Last 24hrs VS reviewed since prior progress note.  Most recent are:  Patient Vitals for the past 24 hrs:   Temp Pulse Resp BP SpO2   18 0824 98.9 °F (37.2 °C) 92 18 141/69 95 %   18 0334 98.9 °F (37.2 °C) 89 18 148/79 96 %   08/20/18 0033 97 °F (36.1 °C) 87 16 135/71 96 %   08/19/18 2117 99.4 °F (37.4 °C) (!) 102 20 136/73 98 %   08/19/18 2030 99.5 °F (37.5 °C) 88 29 130/59 98 %   08/19/18 1945 - 93 20 124/56 98 %   08/19/18 1930 - 98 18 126/64 98 %   08/19/18 1915 - 94 16 139/59 98 %   08/19/18 1745 - (!) 120 26 146/70 100 %   08/19/18 1730 - (!) 108 20 142/64 99 %   08/19/18 1530 - 95 16 142/63 99 %   08/19/18 1452 - (!) 110 16 147/80 -   08/19/18 1418 98.4 °F (36.9 °C) (!) 109 16 151/75 100 %       Intake/Output Summary (Last 24 hours) at 08/20/18 1112  Last data filed at 08/20/18 0702   Gross per 24 hour   Intake             1190 ml   Output                0 ml   Net             1190 ml        PHYSICAL EXAM:  General: WD, WN. Lethargic. Arouses, no acute distress    EENT:  EOMI. Anicteric sclerae. MMM  Resp:  CTA bilaterally, no wheezing or rales. No accessory muscle use  CV:  Regular  rhythm,  No edema  GI:  Soft, Non distended, Non tender.  +Bowel sounds  Neurologic:  Lethargic, arouses. Tremors when aroused. Psych:   Not agitated  Skin:  No rashes. No jaundice    Reviewed most current lab test results and cultures  YES  Reviewed most current radiology test results   YES  Review and summation of old records today    NO  Reviewed patient's current orders and MAR    YES  PMH/SH reviewed - no change compared to H&P  ________________________________________________________________________  Care Plan discussed with:    Comments   Patient     Family      RN y    Care Manager     Consultant                        Multidiciplinary team rounds were held today with , nursing, pharmacist and clinical coordinator. Patient's plan of care was discussed; medications were reviewed and discharge planning was addressed.      ________________________________________________________________________  Total NON critical care TIME:  30   Minutes    Total CRITICAL CARE TIME Spent:   Minutes non procedure based      Comments   >50% of visit spent in counseling and coordination of care     ________________________________________________________________________  Michael Salgado MD     Procedures: see electronic medical records for all procedures/Xrays and details which were not copied into this note but were reviewed prior to creation of Plan. LABS:  I reviewed today's most current labs and imaging studies.   Pertinent labs include:  Recent Labs      08/20/18   0334  08/19/18   1444   WBC  6.9  16.1*   HGB  12.9  15.9   HCT  36.2*  43.5   PLT  153  283     Recent Labs      08/20/18   0334  08/19/18   1444   NA  136  131*   K  3.9  3.0*   CL  103  93*   CO2  26  29   GLU  91  115*   BUN  8  14   CREA  0.61*  0.75   CA  7.9*  9.1   MG  1.9  1.9   PHOS  1.4*   --    ALB  2.7*  3.9   TBILI  1.7*  1.8*   SGOT  46*  37   ALT  26  31       Signed: Michael Salgado MD

## 2018-08-20 NOTE — PROGRESS NOTES
Pt received from ED at 2115, unable to ambulate from stretcher to bed d/t B/L LE weakness, R weaker than L. Pt oriented to self, hospital, date. C/o nausea, sore throat, using chloraseptic spray with assistance. Pt spit up small amount watery fluid shortly on arrival to unit. VSS on room air. CIWA Q2 at this time, requiring prn Ativan pushes. Pt is intermittently confused, incontinent, making inappropriate comments at times. Attempt to obtain history from patient; states he quit smoking, drinks regularly (was not specific), last drink on Friday (8/17), stated his right leg \"doesn't work because of Polio\", has small scattered skin tears to B/L UE r/t falls at home. Bed alarm in place. All fall precautions in place, bed in lowest position, wheels locked, call bell within reach, door open, frequent monitoring.

## 2018-08-20 NOTE — PROGRESS NOTES
Occupational Therapy Note 1050    Orders received, chart reviewed. Spoke to RN who reports that pt is not appropriate for participation with therapy as he is disoriented, not following commands, diaphoretic, and going through the DTs. RN reports pt is receiving 4mg Ativan every hour. Will hold OT evaluation at this time however continue to follow.  Thank you

## 2018-08-20 NOTE — ED NOTES
TRANSFER - OUT REPORT:    Verbal report given to Lit Mondragon RN(name) on [employer]  being transferred to Gen Surg(unit) for routine progression of care       Report consisted of patients Situation, Background, Assessment and   Recommendations(SBAR). Information from the following report(s) SBAR, Kardex, ED Summary, Procedure Summary, MAR and Recent Results was reviewed with the receiving nurse. Lines:   Peripheral IV 08/19/18 Right Forearm (Active)   Site Assessment Clean, dry, & intact 8/19/2018  2:43 PM   Phlebitis Assessment 0 8/19/2018  2:43 PM   Infiltration Assessment 0 8/19/2018  2:43 PM   Dressing Status Clean, dry, & intact 8/19/2018  2:43 PM   Dressing Type Transparent 8/19/2018  2:43 PM        Opportunity for questions and clarification was provided.
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
## 2018-08-20 NOTE — ROUTINE PROCESS
TRANSFER - OUT REPORT:    Verbal report given to Tona Clayton RN on [employer]  being transferred to PCU for greater level of care. Report consisted of patients Situation, Background, Assessment and   Recommendations(SBAR). Information from the following report(s) SBAR, Kardex, [facility], Recent Results and Cardiac Rhythm . was reviewed with the receiving nurse. Opportunity for questions and clarification was provided. Patient transported with:  Telemetry, infusion pump, RN.

## 2018-08-20 NOTE — PROGRESS NOTES
Orders received, chart reviewed. Spoke to RN who reports that pt is not appropriate for participation with therapy as he is disoriented, not following commands, diaphoretic, and going through the DTs. RN reports pt is receiving 4mg Ativan every hour. Will hold PT evaluation at this time however continue to follow.  Thank you    Red Pena, PT, DPT

## 2018-08-21 LAB
ANION GAP SERPL CALC-SCNC: 7 MMOL/L (ref 5–15)
BUN SERPL-MCNC: 5 MG/DL (ref 6–20)
BUN/CREAT SERPL: 8 (ref 12–20)
CALCIUM SERPL-MCNC: 8.2 MG/DL (ref 8.5–10.1)
CHLORIDE SERPL-SCNC: 106 MMOL/L (ref 97–108)
CO2 SERPL-SCNC: 26 MMOL/L (ref 21–32)
CREAT SERPL-MCNC: 0.63 MG/DL (ref 0.7–1.3)
GLUCOSE SERPL-MCNC: 86 MG/DL (ref 65–100)
PHOSPHATE SERPL-MCNC: 2 MG/DL (ref 2.6–4.7)
POTASSIUM SERPL-SCNC: 3.1 MMOL/L (ref 3.5–5.1)
SODIUM SERPL-SCNC: 139 MMOL/L (ref 136–145)

## 2018-08-21 PROCEDURE — 36415 COLL VENOUS BLD VENIPUNCTURE: CPT

## 2018-08-21 PROCEDURE — 74011000250 HC RX REV CODE- 250: Performed by: NURSE PRACTITIONER

## 2018-08-21 PROCEDURE — 74011250636 HC RX REV CODE- 250/636: Performed by: INTERNAL MEDICINE

## 2018-08-21 PROCEDURE — 80048 BASIC METABOLIC PNL TOTAL CA: CPT

## 2018-08-21 PROCEDURE — 74011250636 HC RX REV CODE- 250/636: Performed by: NURSE PRACTITIONER

## 2018-08-21 PROCEDURE — 97535 SELF CARE MNGMENT TRAINING: CPT

## 2018-08-21 PROCEDURE — 65660000000 HC RM CCU STEPDOWN

## 2018-08-21 PROCEDURE — G8979 MOBILITY GOAL STATUS: HCPCS | Performed by: PHYSICAL THERAPIST

## 2018-08-21 PROCEDURE — G8978 MOBILITY CURRENT STATUS: HCPCS | Performed by: PHYSICAL THERAPIST

## 2018-08-21 PROCEDURE — 97161 PT EVAL LOW COMPLEX 20 MIN: CPT | Performed by: PHYSICAL THERAPIST

## 2018-08-21 PROCEDURE — 74011000258 HC RX REV CODE- 258: Performed by: INTERNAL MEDICINE

## 2018-08-21 PROCEDURE — C9113 INJ PANTOPRAZOLE SODIUM, VIA: HCPCS | Performed by: INTERNAL MEDICINE

## 2018-08-21 PROCEDURE — 84100 ASSAY OF PHOSPHORUS: CPT

## 2018-08-21 PROCEDURE — 97165 OT EVAL LOW COMPLEX 30 MIN: CPT

## 2018-08-21 PROCEDURE — 74011000250 HC RX REV CODE- 250: Performed by: INTERNAL MEDICINE

## 2018-08-21 PROCEDURE — 97530 THERAPEUTIC ACTIVITIES: CPT | Performed by: PHYSICAL THERAPIST

## 2018-08-21 RX ORDER — ASPIRIN 325 MG/1
100 TABLET, FILM COATED ORAL DAILY
Status: DISCONTINUED | OUTPATIENT
Start: 2018-08-24 | End: 2018-08-24 | Stop reason: HOSPADM

## 2018-08-21 RX ORDER — POTASSIUM CHLORIDE 7.45 MG/ML
10 INJECTION INTRAVENOUS
Status: DISCONTINUED | OUTPATIENT
Start: 2018-08-21 | End: 2018-08-21

## 2018-08-21 RX ORDER — THIAMINE HYDROCHLORIDE 100 MG/ML
100 INJECTION, SOLUTION INTRAMUSCULAR; INTRAVENOUS DAILY
Status: DISCONTINUED | OUTPATIENT
Start: 2018-08-22 | End: 2018-08-21

## 2018-08-21 RX ORDER — POTASSIUM CHLORIDE 7.45 MG/ML
10 INJECTION INTRAVENOUS
Status: COMPLETED | OUTPATIENT
Start: 2018-08-21 | End: 2018-08-21

## 2018-08-21 RX ADMIN — Medication 10 ML: at 15:27

## 2018-08-21 RX ADMIN — ONDANSETRON 4 MG: 2 INJECTION, SOLUTION INTRAMUSCULAR; INTRAVENOUS at 19:44

## 2018-08-21 RX ADMIN — POTASSIUM CHLORIDE 10 MEQ: 10 INJECTION, SOLUTION INTRAVENOUS at 11:10

## 2018-08-21 RX ADMIN — SODIUM CHLORIDE AND POTASSIUM CHLORIDE: 9; 2.98 INJECTION, SOLUTION INTRAVENOUS at 09:02

## 2018-08-21 RX ADMIN — LORAZEPAM 4 MG: 2 INJECTION INTRAMUSCULAR; INTRAVENOUS at 02:12

## 2018-08-21 RX ADMIN — LORAZEPAM 4 MG: 2 INJECTION INTRAMUSCULAR; INTRAVENOUS at 19:41

## 2018-08-21 RX ADMIN — Medication 10 ML: at 19:48

## 2018-08-21 RX ADMIN — LORAZEPAM 2 MG: 2 INJECTION INTRAMUSCULAR; INTRAVENOUS at 17:18

## 2018-08-21 RX ADMIN — THIAMINE HYDROCHLORIDE 100 MG: 100 INJECTION, SOLUTION INTRAMUSCULAR; INTRAVENOUS at 16:07

## 2018-08-21 RX ADMIN — ONDANSETRON 4 MG: 2 INJECTION, SOLUTION INTRAMUSCULAR; INTRAVENOUS at 12:45

## 2018-08-21 RX ADMIN — SODIUM CHLORIDE 40 MG: 9 INJECTION, SOLUTION INTRAMUSCULAR; INTRAVENOUS; SUBCUTANEOUS at 09:07

## 2018-08-21 RX ADMIN — POTASSIUM CHLORIDE 10 MEQ: 10 INJECTION, SOLUTION INTRAVENOUS at 10:20

## 2018-08-21 RX ADMIN — SODIUM CHLORIDE AND POTASSIUM CHLORIDE: 9; 2.98 INJECTION, SOLUTION INTRAVENOUS at 23:22

## 2018-08-21 RX ADMIN — POTASSIUM PHOSPHATE, MONOBASIC AND POTASSIUM PHOSPHATE, DIBASIC: 224; 236 INJECTION, SOLUTION INTRAVENOUS at 09:03

## 2018-08-21 RX ADMIN — POTASSIUM CHLORIDE 10 MEQ: 10 INJECTION, SOLUTION INTRAVENOUS at 09:15

## 2018-08-21 RX ADMIN — FOLIC ACID: 5 INJECTION, SOLUTION INTRAMUSCULAR; INTRAVENOUS; SUBCUTANEOUS at 15:22

## 2018-08-21 NOTE — PROGRESS NOTES
Gastroenterology Daily Progress Note (Dr. Ludwig Rowe)   [address] [place] Date: 8/19/2018       Subjective:       Patient is drowsy again on rounds. Was medicated with zofran earlier this am for ? Nausea but no vomiting. Was not able to work with speech path due to somnolence. Events overnight reviewed with nursing. Current Facility-Administered Medications   Medication Dose Route Frequency    hydrALAZINE (APRESOLINE) 20 mg/mL injection 10 mg  10 mg IntraVENous Q4H PRN    sodium chloride (NS) flush 5-10 mL  5-10 mL IntraVENous PRN    sodium chloride (NS) flush 5-10 mL  5-10 mL IntraVENous Q8H    sodium chloride (NS) flush 5-10 mL  5-10 mL IntraVENous PRN    acetaminophen (TYLENOL) tablet 650 mg  650 mg Oral Q4H PRN    ondansetron (ZOFRAN) injection 4 mg  4 mg IntraVENous Q4H PRN    LORazepam (ATIVAN) injection 2 mg  2 mg IntraVENous Q1H PRN    LORazepam (ATIVAN) injection 4 mg  4 mg IntraVENous Q1H PRN    prochlorperazine (COMPAZINE) with saline injection 10 mg  10 mg IntraVENous Q6H PRN    0.9% sodium chloride 2,576 mL with folic acid 1 mg, thiamine 100 mg, mvi, adult no. 4 with vit K 10 mL infusion   IntraVENous DAILY    pantoprazole (PROTONIX) 40 mg in sodium chloride 0.9% 10 mL injection  40 mg IntraVENous Q12H    0.9% sodium chloride with KCl 40 mEq/L infusion   IntraVENous CONTINUOUS    nicotine (NICODERM CQ) 7 mg/24 hr patch 1 Patch  1 Patch TransDERmal DAILY PRN        Objective:     Visit Vitals    /63    Pulse 69    Temp 98.2 °F (36.8 °C)    Resp 18    Ht 5' 10\" (1.778 m)    Wt 74.6 kg (164 lb 7.4 oz)    SpO2 95%    BMI 23.6 kg/m2   Blood pressure 134/63, pulse 69, temperature 98.2 °F (36.8 °C), resp. rate 18, height 5' 10\" (1.778 m), weight 74.6 kg (164 lb 7.4 oz), SpO2 95 %.      08/19 1901 - 08/21 0700  In: 2300 [P.O.:60; I.V.:1130]  Out: -     Physical Exam:     General: drowsy, arousable, oriented to name  Chest:  CTA, No rhonchi, rales or rubs.  Heart: S1, S2, RRR  GI: Soft, NT, ND + bowel sounds  Neuro: somnolent but arousable, moves all 4 extremities    Labs:       Recent Results (from the past 24 hour(s))   PROTHROMBIN TIME + INR    Collection Time: 08/20/18 12:15 PM   Result Value Ref Range    INR 1.0 0.9 - 1.1      Prothrombin time 10.3 9.0 - 96.8 sec   METABOLIC PANEL, BASIC    Collection Time: 08/21/18  2:58 AM   Result Value Ref Range    Sodium 139 136 - 145 mmol/L    Potassium 3.1 (L) 3.5 - 5.1 mmol/L    Chloride 106 97 - 108 mmol/L    CO2 26 21 - 32 mmol/L    Anion gap 7 5 - 15 mmol/L    Glucose 86 65 - 100 mg/dL    BUN 5 (L) 6 - 20 MG/DL    Creatinine 0.63 (L) 0.70 - 1.30 MG/DL    BUN/Creatinine ratio 8 (L) 12 - 20      GFR est AA >60 >60 ml/min/1.73m2    GFR est non-AA >60 >60 ml/min/1.73m2    Calcium 8.2 (L) 8.5 - 10.1 MG/DL   PHOSPHORUS    Collection Time: 08/21/18  2:58 AM   Result Value Ref Range    Phosphorus 2.0 (L) 2.6 - 4.7 MG/DL     Recent Labs      08/21/18   0258  08/20/18   0334  08/19/18   1444   NA  139  136  131*   K  3.1*  3.9  3.0*   CL  106  103  93*   CO2  26  26  29   BUN  5*  8  14   CREA  0.63*  0.61*  0.75   GLU  86  91  115*   CA  8.2*  7.9*  9.1   MG   --   1.9  1.9   PHOS  2.0*  1.4*   --      Recent Labs      08/20/18   1215   INR  1.0   PTP  10.3     Recent Labs      08/20/18   0334  08/19/18   1444   SGOT  46*  37   AP  63  91   TP  6.2*  8.1   ALB  2.7*  3.9   GLOB  3.5  4.2*   LPSE   --   129       Impression:    N/V prior to admission with some hematemesis  Alcohol withdrawal on [facility] protocol  AMS       Plan:  Patient remains on [facility] protocol and is receiving treatment for alcohol withdrawal and will continue likely for another day or two. In meantime continue on PPI and holding on EGD for now unless he briskly bleeds.         Dom Messina MD    8/21/2018  [address], [address] [place] [address]  [phone] [id number] [address] [address] [place]: [phone]
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
## 2018-08-21 NOTE — PROGRESS NOTES
Bedside and Verbal shift change report received from [facility] [[email or web address] Report received  with SBAR, Kardex, ED Summary, OR Summary, Procedure Summary, Intake/Output, MAR, Accordion and Recent Results. Pt received some what confused laying in the bed. Pt breaths well on room air. NSR noted on the tele. See doc flow sheet for details. Pt gets confused at times requires frequent redirection. Tele sitter in progress to ensure his safety. 0900 pt resting at this time. 1000 pt getting IV KCL as ordered. 1111 Niesha Mallory with pt's brother Celia Juarez, he stated that he is MPOA, requested to send MPOA documents by fax. His contact no , cell .    1230 mouth care done. 1400 pt resting now. 1630 Pt had BM, skin care done, linens changed. 1800 pt resting now. 1900 Bedside and Verbal shift change report given to [address] [place] [address] (oncoming nurse) by cjp (offgoing nurse). Report given with SBAR, Kardex, ED Summary, OR Summary, Procedure Summary, Intake/Output, MAR, Accordion and Recent Results.
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
## 2018-08-21 NOTE — PROGRESS NOTES
Patient anointed with the Sacrament of the Sick by on 8/21/18.     ABDIAS Yates, [facility], Staff [address] [place] [address] Service  [place] ([phone])

## 2018-08-21 NOTE — PROGRESS NOTES
Hospitalist Progress Note    NAME: Emilia Braxton Sr.   :  1955   MRN:  [id number]       Interim Hospital Summary: 70 y.o. male whom presented on 2018 with      Assessment / Plan:  Nausea/Vomiting   Hematemesis POA  Dehydration secondary to vomiting  - consistent throat clearing and cough  - appreciate SPL input; keep pt on NPO for now  - no further hematemesis for the past 24 hours  - H&H (12.9/36.2) on . Will recheck tomorrow  - appreciate GI input; No EGD due to decrease of mental status. Continue with PPI  - continue with IVF    Alcohol withdrawal  Acute metabolic encephalopathy  Alcohol abuse - has 6 pack daily. - continue with CIWA  - Banana bag daily  - due to alcohol withdrawal.  - Tox screen (+) for THC     Non-infectious SIRS - tachycardia and leukocytosis on admission (resolved)  - WBC normalized. No signs of active infection. Leukocytosis likely reactive. - lactic acid normal.  - blood cx no growth so far     Electrolyte imbalance (K, Mg, PO4)  - repleted;will continue to monitor     Mild hyperglycemia on admission  - A1c 4.4  - normal TSH, free T4  - AM cortisol 16.9  - glucose stayed 's      History of Rheumatic Fever and Polio      Tobacco Use:  - nicotine patch prn      Body mass index is 23.6 kg/(m^2).     Code status: DNR  Prophylaxis: SCD's  Recommended Disposition:  with  supervision     Subjective:     Chief Complaint / Reason for Physician Visit  Confused. [place] to himself. Discussed with RN events overnight. Review of Systems:  Symptom Y/N Comments  Symptom Y/N Comments   Fever/Chills    Chest Pain     Poor Appetite    Edema     Cough    Abdominal Pain     Sputum    Joint Pain     SOB/SPAULDING    Pruritis/Rash     Nausea/vomit    Tolerating PT/OT     Diarrhea    Tolerating Diet     Constipation    Other       Could NOT obtain due to:      Objective:     VITALS:   Last 24hrs VS reviewed since prior progress note.  Most recent are:  Patient Vitals for the past 24 hrs:   Temp Pulse Resp BP SpO2   08/21/18 1256 98.8 °F (37.1 °C) 70 18 - 100 %   08/21/18 1043 (!) 100.8 °F (38.2 °C) 70 - 137/68 100 %   08/21/18 0715 97.6 °F (36.4 °C) 77 18 131/81 100 %   08/21/18 0300 98.2 °F (36.8 °C) 69 18 134/63 95 %   08/21/18 0214 - - - 136/87 100 %   08/21/18 0200 - 79 - 131/83 94 %   08/21/18 0000 98.8 °F (37.1 °C) 88 22 140/71 98 %   08/20/18 2330 - 86 - 174/69 99 %   08/20/18 2300 - 81 - 122/60 97 %   08/20/18 2230 - 78 - 116/58 96 %   08/20/18 1900 98.3 °F (36.8 °C) 87 22 142/66 98 %       Intake/Output Summary (Last 24 hours) at 08/21/18 1639  Last data filed at 08/21/18 1500   Gross per 24 hour   Intake                0 ml   Output                0 ml   Net                0 ml        PHYSICAL EXAM:  General: Ill appearing. Alert, cooperative, no acute distress    EENT:  EOMI. Anicteric sclerae. MMM  Resp:  CTA bilaterally, no wheezing or rales. No accessory muscle use  CV:  Regular  rhythm,  No edema  GI:  Soft, Non distended, Non tender.  +Bowel sounds  Neurologic:  Open eyes to verbal/tactile stimuli. Only orient to himself only. Psych:   poor insight.   Skin:  No rashes. No jaundice    Reviewed most current lab test results and cultures  YES  Reviewed most current radiology test results   YES  Review and summation of old records today    NO  Reviewed patient's current orders and MAR    YES  PMH/SH reviewed - no change compared to H&P  ________________________________________________________________________  Care Plan discussed with:    Comments   Patient y    Family      RN y    Care Manager y    Consultant                       y Multidiciplinary team rounds were held today with , nursing, pharmacist and clinical coordinator. Patient's plan of care was discussed; medications were reviewed and discharge planning was addressed.      ________________________________________________________________________  Total NON critical care TIME:  30   Minutes    Total CRITICAL CARE TIME Spent:   Minutes non procedure based      Comments   >50% of visit spent in counseling and coordination of care     ________________________________________________________________________  Betzaida Umana NP     Procedures: see electronic medical records for all procedures/Xrays and details which were not copied into this note but were reviewed prior to creation of Plan. LABS:  I reviewed today's most current labs and imaging studies.   Pertinent labs include:  Recent Labs      08/20/18   0334  08/19/18   1444   WBC  6.9  16.1*   HGB  12.9  15.9   HCT  36.2*  43.5   PLT  153  283     Recent Labs      08/21/18   0258  08/20/18   1215  08/20/18   0334  08/19/18   1444   NA  139   --   136  131*   K  3.1*   --   3.9  3.0*   CL  106   --   103  93*   CO2  26   --   26  29   GLU  86   --   91  115*   BUN  5*   --   8  14   CREA  0.63*   --   0.61*  0.75   CA  8.2*   --   7.9*  9.1   MG   --    --   1.9  1.9   PHOS  2.0*   --   1.4*   --    ALB   --    --   2.7*  3.9   TBILI   --    --   1.7*  1.8*   SGOT   --    --   46*  37   ALT   --    --   26  31   INR   --   1.0   --    --        Signed: )Christiane Short NP

## 2018-08-21 NOTE — PROGRESS NOTES
Speech pathology note  Reviewed chart and discussed case with RN who reported patient has been sleeping this morning. Note patient actively withdrawing yesterday, and last dose of Ativan given at 21:58 last night. Attempted to see patient for swallowing evaluation, however patient not appropriate for PO consideration at this time. Patient drowsy, however awoke to mod verbal/tactile stimulation and spoke to SLP. Despite intermittent responses to SLP, patient kept his eyes closed throughout session. Patient oriented to person and hospital, but not the name of the hospital. Disoriented to time. Patient with coughing, however he is unable to clear secretions/mucous. Patient initially with wet vocal quality, however as session progressed, patient aphonic. Suspect this is related to drowsiness. Patient then fell back asleep after <5 minutes and stopped verbally responding to SLP. Recommend NPO at this time secondary to level of alertness and poor secretion management. SLP to follow up tomorrow for swallowing evaluation as appropriate. Thank you.     Aziza Goncalves., CCC-SLP

## 2018-08-21 NOTE — PROGRESS NOTES
Bedside shift change report given to [facility] (oncoming nurse) by Angi Alexander (offgoing nurse). Report included the following information SBAR, Kardex, MAR, Recent Results and Cardiac Rhythm NSR_ST.

## 2018-08-22 ENCOUNTER — APPOINTMENT (OUTPATIENT)
Dept: GENERAL RADIOLOGY | Age: 63
DRG: 380 | End: 2018-08-22
Attending: EMERGENCY MEDICINE
Payer: MEDICARE

## 2018-08-22 LAB
ALBUMIN SERPL-MCNC: 2.6 G/DL (ref 3.5–5)
ALBUMIN/GLOB SERPL: 0.7 {RATIO} (ref 1.1–2.2)
ALP SERPL-CCNC: 65 U/L (ref 45–117)
ALT SERPL-CCNC: 24 U/L (ref 12–78)
ANION GAP SERPL CALC-SCNC: 10 MMOL/L (ref 5–15)
AST SERPL-CCNC: 25 U/L (ref 15–37)
BASOPHILS # BLD: 0 K/UL (ref 0–0.1)
BASOPHILS NFR BLD: 0 % (ref 0–1)
BILIRUB SERPL-MCNC: 1.3 MG/DL (ref 0.2–1)
BUN SERPL-MCNC: 6 MG/DL (ref 6–20)
BUN/CREAT SERPL: 11 (ref 12–20)
CALCIUM SERPL-MCNC: 8.1 MG/DL (ref 8.5–10.1)
CHLORIDE SERPL-SCNC: 103 MMOL/L (ref 97–108)
CO2 SERPL-SCNC: 23 MMOL/L (ref 21–32)
CREAT SERPL-MCNC: 0.57 MG/DL (ref 0.7–1.3)
DIFFERENTIAL METHOD BLD: ABNORMAL
EOSINOPHIL # BLD: 0.2 K/UL (ref 0–0.4)
EOSINOPHIL NFR BLD: 2 % (ref 0–7)
ERYTHROCYTE [DISTWIDTH] IN BLOOD BY AUTOMATED COUNT: 11.5 % (ref 11.5–14.5)
GLOBULIN SER CALC-MCNC: 3.7 G/DL (ref 2–4)
GLUCOSE SERPL-MCNC: 81 MG/DL (ref 65–100)
HCT VFR BLD AUTO: 36.5 % (ref 36.6–50.3)
HGB BLD-MCNC: 12.6 G/DL (ref 12.1–17)
IMM GRANULOCYTES # BLD: 0 K/UL (ref 0–0.04)
IMM GRANULOCYTES NFR BLD AUTO: 0 % (ref 0–0.5)
LACTATE SERPL-SCNC: 0.6 MMOL/L (ref 0.4–2)
LIPASE SERPL-CCNC: 112 U/L (ref 73–393)
LYMPHOCYTES # BLD: 1 K/UL (ref 0.8–3.5)
LYMPHOCYTES NFR BLD: 12 % (ref 12–49)
MAGNESIUM SERPL-MCNC: 1.7 MG/DL (ref 1.6–2.4)
MCH RBC QN AUTO: 34.4 PG (ref 26–34)
MCHC RBC AUTO-ENTMCNC: 34.5 G/DL (ref 30–36.5)
MCV RBC AUTO: 99.7 FL (ref 80–99)
MONOCYTES # BLD: 0.6 K/UL (ref 0–1)
MONOCYTES NFR BLD: 7 % (ref 5–13)
NEUTS SEG # BLD: 6.6 K/UL (ref 1.8–8)
NEUTS SEG NFR BLD: 79 % (ref 32–75)
NRBC # BLD: 0 K/UL (ref 0–0.01)
NRBC BLD-RTO: 0 PER 100 WBC
PHOSPHATE SERPL-MCNC: 2.1 MG/DL (ref 2.6–4.7)
PLATELET # BLD AUTO: 155 K/UL (ref 150–400)
PMV BLD AUTO: 10.3 FL (ref 8.9–12.9)
POTASSIUM SERPL-SCNC: 3.6 MMOL/L (ref 3.5–5.1)
PROT SERPL-MCNC: 6.3 G/DL (ref 6.4–8.2)
RBC # BLD AUTO: 3.66 M/UL (ref 4.1–5.7)
SODIUM SERPL-SCNC: 136 MMOL/L (ref 136–145)
TROPONIN I SERPL-MCNC: <0.05 NG/ML
WBC # BLD AUTO: 8.4 K/UL (ref 4.1–11.1)

## 2018-08-22 PROCEDURE — 97530 THERAPEUTIC ACTIVITIES: CPT

## 2018-08-22 PROCEDURE — 71045 X-RAY EXAM CHEST 1 VIEW: CPT

## 2018-08-22 PROCEDURE — 83735 ASSAY OF MAGNESIUM: CPT | Performed by: NURSE PRACTITIONER

## 2018-08-22 PROCEDURE — 92610 EVALUATE SWALLOWING FUNCTION: CPT | Performed by: SPEECH-LANGUAGE PATHOLOGIST

## 2018-08-22 PROCEDURE — 84100 ASSAY OF PHOSPHORUS: CPT | Performed by: NURSE PRACTITIONER

## 2018-08-22 PROCEDURE — 84484 ASSAY OF TROPONIN QUANT: CPT | Performed by: NURSE PRACTITIONER

## 2018-08-22 PROCEDURE — 77030011256 HC DRSG MEPILEX <16IN NO BORD MOLN -A

## 2018-08-22 PROCEDURE — 83605 ASSAY OF LACTIC ACID: CPT | Performed by: INTERNAL MEDICINE

## 2018-08-22 PROCEDURE — 74011000258 HC RX REV CODE- 258: Performed by: INTERNAL MEDICINE

## 2018-08-22 PROCEDURE — 85025 COMPLETE CBC W/AUTO DIFF WBC: CPT | Performed by: NURSE PRACTITIONER

## 2018-08-22 PROCEDURE — 74011250636 HC RX REV CODE- 250/636: Performed by: NURSE PRACTITIONER

## 2018-08-22 PROCEDURE — C9113 INJ PANTOPRAZOLE SODIUM, VIA: HCPCS | Performed by: NURSE PRACTITIONER

## 2018-08-22 PROCEDURE — 74011250636 HC RX REV CODE- 250/636: Performed by: INTERNAL MEDICINE

## 2018-08-22 PROCEDURE — 77030010545

## 2018-08-22 PROCEDURE — 83690 ASSAY OF LIPASE: CPT | Performed by: NURSE PRACTITIONER

## 2018-08-22 PROCEDURE — 74011250637 HC RX REV CODE- 250/637: Performed by: INTERNAL MEDICINE

## 2018-08-22 PROCEDURE — 74011000250 HC RX REV CODE- 250: Performed by: NURSE PRACTITIONER

## 2018-08-22 PROCEDURE — 36415 COLL VENOUS BLD VENIPUNCTURE: CPT | Performed by: NURSE PRACTITIONER

## 2018-08-22 PROCEDURE — 65660000000 HC RM CCU STEPDOWN

## 2018-08-22 PROCEDURE — 80053 COMPREHEN METABOLIC PANEL: CPT | Performed by: NURSE PRACTITIONER

## 2018-08-22 PROCEDURE — 97535 SELF CARE MNGMENT TRAINING: CPT

## 2018-08-22 PROCEDURE — 74011250637 HC RX REV CODE- 250/637: Performed by: NURSE PRACTITIONER

## 2018-08-22 RX ORDER — NYSTATIN 100000 [USP'U]/ML
500000 SUSPENSION ORAL 4 TIMES DAILY
Status: DISPENSED | OUTPATIENT
Start: 2018-08-22 | End: 2018-08-23

## 2018-08-22 RX ORDER — PANTOPRAZOLE SODIUM 40 MG/1
40 TABLET, DELAYED RELEASE ORAL
Status: DISCONTINUED | OUTPATIENT
Start: 2018-08-23 | End: 2018-08-23

## 2018-08-22 RX ORDER — THIAMINE HYDROCHLORIDE 100 MG/ML
100 INJECTION, SOLUTION INTRAMUSCULAR; INTRAVENOUS DAILY
Status: DISCONTINUED | OUTPATIENT
Start: 2018-08-22 | End: 2018-08-22

## 2018-08-22 RX ADMIN — LORAZEPAM 4 MG: 2 INJECTION INTRAMUSCULAR; INTRAVENOUS at 04:10

## 2018-08-22 RX ADMIN — POTASSIUM PHOSPHATE, MONOBASIC AND POTASSIUM PHOSPHATE, DIBASIC: 224; 236 INJECTION, SOLUTION INTRAVENOUS at 09:50

## 2018-08-22 RX ADMIN — ACETAMINOPHEN 650 MG: 325 TABLET ORAL at 12:58

## 2018-08-22 RX ADMIN — Medication 10 ML: at 04:15

## 2018-08-22 RX ADMIN — ONDANSETRON 4 MG: 2 INJECTION, SOLUTION INTRAMUSCULAR; INTRAVENOUS at 04:10

## 2018-08-22 RX ADMIN — Medication 10 ML: at 22:43

## 2018-08-22 RX ADMIN — NYSTATIN 500000 UNITS: 100000 SUSPENSION ORAL at 14:50

## 2018-08-22 RX ADMIN — NYSTATIN 500000 UNITS: 100000 SUSPENSION ORAL at 22:43

## 2018-08-22 RX ADMIN — NYSTATIN 500000 UNITS: 100000 SUSPENSION ORAL at 17:28

## 2018-08-22 RX ADMIN — THIAMINE HYDROCHLORIDE 100 MG: 100 INJECTION, SOLUTION INTRAMUSCULAR; INTRAVENOUS at 20:10

## 2018-08-22 RX ADMIN — ONDANSETRON 4 MG: 2 INJECTION, SOLUTION INTRAMUSCULAR; INTRAVENOUS at 19:54

## 2018-08-22 RX ADMIN — SODIUM CHLORIDE 40 MG: 9 INJECTION, SOLUTION INTRAMUSCULAR; INTRAVENOUS; SUBCUTANEOUS at 09:51

## 2018-08-22 RX ADMIN — Medication 10 ML: at 13:00

## 2018-08-22 RX ADMIN — LORAZEPAM 2 MG: 2 INJECTION INTRAMUSCULAR; INTRAVENOUS at 20:05

## 2018-08-22 RX ADMIN — THIAMINE HYDROCHLORIDE 100 MG: 100 INJECTION, SOLUTION INTRAMUSCULAR; INTRAVENOUS at 10:18

## 2018-08-22 NOTE — PROGRESS NOTES
Bedside shift change report given to [address] [place] [address] (oncoming nurse) by Miguel Ivan RN (offgoing nurse). Report included the following information SBAR, MAR, Recent Results and Cardiac Rhythm NSR.     04:30  . Pt also with increased cough and c/o L sided rib pain. Pt rates pain 10/10 aggravated by hiccups and coughing. Cough is wet/congested and non productive, which it has been, however pt seems to be coughing more frequently. Lungs now sound coarse with expiratory wheezing. Stopped IVF and notified Dr. Azul Morales. Order received for STAT CXR and to continue holding IVF until CXR results are done. 04:35  Notified radiology of portable CXR orders. Megan Webster RN    04:55  Call back to radiology to notify that CXR order not entered as STAT, but is STAT. Radiology aware of STAT order and stated he will be to the unit to obtain xray asap. Megan Webster RN    07:15  Bedside shift change report given to [employer] (oncoming nurse) by Megan Webster RN (offgoing nurse). Report included the following information SBAR, Kardex, MAR, Recent Results and Cardiac Rhythm NSR.

## 2018-08-22 NOTE — PROGRESS NOTES
Hospitalist Progress Note    NAME: Sinai Robles Sr.   :  1955   MRN:  [id number]       Interim Hospital Summary: 70 y.o. male whom presented on 2018 with      Assessment / Plan:  Nausea/Vomiting   Hematemesis POA  Dehydration secondary to vomiting  - consistent throat clearing and cough  - appreciate SPL input; Full liquid diet     Sit upright for all PO     Small bites     Single sips     Medication whole or crushed as tolerated  - no further hematemesis  - H&H (12.6/36.5)   - appreciate GI input; EGD in near future per GI. Will continue with PPI  - continue with IVF (decreased the rate to 50ml from 100ml /hr)     Alcohol withdrawal  Acute metabolic encephalopathy  Alcohol abuse - has 6 pack daily. - much more alert today. Continue with thiamin, folate, multivitiamin  - continue with CIWA  - due to alcohol withdrawal.  - Tox screen (+) for THC      Non-infectious SIRS - tachycardia and leukocytosis on admission (resolved)  - WBC normalized. No signs of active infection.  Leukocytosis likely reactive. - lactic acid normal.  - blood cx no growth so far      Electrolyte imbalance (K, Mg, PO4)  - repleted;will continue to monitor      Mild hyperglycemia on admission  - A1c 4.4  - normal TSH, free T4  - AM cortisol 16.9  - glucose stayed 's      History of Rheumatic Fever and Polio      Tobacco Use:  - nicotine patch prn      Body mass index is 23.6 kg/(m^2).     Code status: DNR  Prophylaxis: SCD's  Recommended Disposition:  with 24/7 supervision         Subjective:     Chief Complaint / Reason for Physician Visit  \"I am thirsty\". Discussed with RN events overnight.      Review of Systems:  Symptom Y/N Comments  Symptom Y/N Comments   Fever/Chills n   Chest Pain n    Poor Appetite    Edema     Cough    Abdominal Pain     Sputum    Joint Pain     SOB/SPAULDING n   Pruritis/Rash     Nausea/vomit n   Tolerating PT/OT     Diarrhea    Tolerating Diet     Constipation    Other       Could NOT obtain due to:      Objective:     VITALS:   Last 24hrs VS reviewed since prior progress note. Most recent are:  Patient Vitals for the past 24 hrs:   Temp Pulse Resp BP SpO2   08/22/18 0814 98.2 °F (36.8 °C) - - - -   08/22/18 0644 99 °F (37.2 °C) 82 16 146/53 97 %   08/22/18 0500 98.8 °F (37.1 °C) 74 16 - 100 %   08/22/18 0409 98.3 °F (36.8 °C) 76 16 153/71 100 %   08/21/18 2318 99.3 °F (37.4 °C) 74 16 186/63 100 %   08/21/18 2027 97.4 °F (36.3 °C) 68 16 145/63 98 %   08/21/18 2000 - 67 - 159/62 98 %   08/21/18 1907 97.7 °F (36.5 °C) 86 16 171/73 100 %   08/21/18 1615 - 73 - 167/82 -   08/21/18 1256 98.8 °F (37.1 °C) 70 18 - 100 %   08/21/18 1043 (!) 100.8 °F (38.2 °C) 70 - 137/68 100 %       Intake/Output Summary (Last 24 hours) at 08/22/18 1040  Last data filed at 08/21/18 1729   Gross per 24 hour   Intake              350 ml   Output                0 ml   Net              350 ml        PHYSICAL EXAM:  General: illl appearing, much more Alert than yesterday. cooperative, no acute distress    EENT:  EOMI. Anicteric sclerae. MMM  Resp:  Coarse breath sounds with a few rhonchi. no wheezing or rales. No accessory muscle use  CV:  Regular  rhythm,  No edema  GI:  Soft, Non distended, Non tender.  +Bowel sounds  Neurologic:  Alert and oriented X 1, normal speech,   Psych:   Poor insight. Not anxious nor agitated  Skin:  No rashes. No jaundice    Reviewed most current lab test results and cultures  YES  Reviewed most current radiology test results   YES  Review and summation of old records today    NO  Reviewed patient's current orders and MAR    YES  PMH/SH reviewed - no change compared to H&P  ________________________________________________________________________  Care Plan discussed with:    Comments   Patient y    Family      RN y    Care Manager y    Consultant                       y Multidiciplinary team rounds were held today with , nursing, pharmacist and clinical coordinator.   Patient's plan of care was discussed; medications were reviewed and discharge planning was addressed. ________________________________________________________________________  Total NON critical care TIME:  30   Minutes    Total CRITICAL CARE TIME Spent:   Minutes non procedure based      Comments   >50% of visit spent in counseling and coordination of care     ________________________________________________________________________  Azael Singh NP     Procedures: see electronic medical records for all procedures/Xrays and details which were not copied into this note but were reviewed prior to creation of Plan. LABS:  I reviewed today's most current labs and imaging studies.   Pertinent labs include:  Recent Labs      08/22/18   0524  08/20/18   0334  08/19/18   1444   WBC  8.4  6.9  16.1*   HGB  12.6  12.9  15.9   HCT  36.5*  36.2*  43.5   PLT  155  153  283     Recent Labs      08/22/18   0524  08/21/18   0258  08/20/18   1215  08/20/18   0334  08/19/18   1444   NA  136  139   --   136  131*   K  3.6  3.1*   --   3.9  3.0*   CL  103  106   --   103  93*   CO2  23  26   --   26  29   GLU  81  86   --   91  115*   BUN  6  5*   --   8  14   CREA  0.57*  0.63*   --   0.61*  0.75   CA  8.1*  8.2*   --   7.9*  9.1   MG  1.7   --    --   1.9  1.9   PHOS  2.1*  2.0*   --   1.4*   --    ALB  2.6*   --    --   2.7*  3.9   TBILI  1.3*   --    --   1.7*  1.8*   SGOT  25   --    --   46*  37   ALT  24   --    --   26  31   INR   --    --   1.0   --    --        Signed: )Christiane Garcia, NP

## 2018-08-22 NOTE — PROGRESS NOTES
Gastroenterology Daily Progress Note (Dr. Keegan Fortune)   [address] [place] Date: 8/19/2018       Subjective:       Patient more awake today with periods of some confusion. He is eating some tomato soup on rounds. No N/V.   + hiccups. No new c/o    Current Facility-Administered Medications   Medication Dose Route Frequency    potassium phosphate 20 mmol in 0.9% sodium chloride 250 mL infusion   IntraVENous ONCE    nystatin (MYCOSTATIN) 100,000 unit/mL oral suspension 500,000 Units  500,000 Units Oral QID    [START ON 8/23/2018] pantoprazole (PROTONIX) tablet 40 mg  40 mg Oral ACB    [START ON 8/24/2018] Thiamine Mononitrate (B-1) tablet 100 mg  100 mg Oral DAILY    hydrALAZINE (APRESOLINE) 20 mg/mL injection 10 mg  10 mg IntraVENous Q4H PRN    sodium chloride (NS) flush 5-10 mL  5-10 mL IntraVENous PRN    sodium chloride (NS) flush 5-10 mL  5-10 mL IntraVENous Q8H    sodium chloride (NS) flush 5-10 mL  5-10 mL IntraVENous PRN    acetaminophen (TYLENOL) tablet 650 mg  650 mg Oral Q4H PRN    ondansetron (ZOFRAN) injection 4 mg  4 mg IntraVENous Q4H PRN    LORazepam (ATIVAN) injection 2 mg  2 mg IntraVENous Q1H PRN    LORazepam (ATIVAN) injection 4 mg  4 mg IntraVENous Q1H PRN    prochlorperazine (COMPAZINE) with saline injection 10 mg  10 mg IntraVENous Q6H PRN    0.9% sodium chloride with KCl 40 mEq/L infusion   IntraVENous CONTINUOUS    nicotine (NICODERM CQ) 7 mg/24 hr patch 1 Patch  1 Patch TransDERmal DAILY PRN        Objective:     Visit Vitals    /70 (BP 1 Location: Left arm, BP Patient Position: At rest)    Pulse 85    Temp 99.7 °F (37.6 °C)    Resp 18    Ht 5' 10\" (1.778 m)    Wt 74.6 kg (164 lb 7.4 oz)    SpO2 100%    BMI 23.6 kg/m2   Blood pressure 154/70, pulse 85, temperature 99.7 °F (37.6 °C), resp. rate 18, height 5' 10\" (1.778 m), weight 74.6 kg (164 lb 7.4 oz), SpO2 100 %.      08/20 1901 - 08/22 0700  In: 350 [I.V.:350]  Out: -     Physical Exam:     General: less drowsy, arousable, oriented to name  Chest:  CTA, No rhonchi, rales or rubs. Heart: S1, S2, RRR  GI: Soft, NT, ND + bowel sounds    Labs:       Recent Results (from the past 24 hour(s))   CBC WITH AUTOMATED DIFF    Collection Time: 08/22/18  5:24 AM   Result Value Ref Range    WBC 8.4 4.1 - 11.1 K/uL    RBC 3.66 (L) 4.10 - 5.70 M/uL    HGB 12.6 12.1 - 17.0 g/dL    HCT 36.5 (L) 36.6 - 50.3 %    MCV 99.7 (H) 80.0 - 99.0 FL    MCH 34.4 (H) 26.0 - 34.0 PG    MCHC 34.5 30.0 - 36.5 g/dL    RDW 11.5 11.5 - 14.5 %    PLATELET 146 385 - 486 K/uL    MPV 10.3 8.9 - 12.9 FL    NRBC 0.0 0  WBC    ABSOLUTE NRBC 0.00 0.00 - 0.01 K/uL    NEUTROPHILS 79 (H) 32 - 75 %    LYMPHOCYTES 12 12 - 49 %    MONOCYTES 7 5 - 13 %    EOSINOPHILS 2 0 - 7 %    BASOPHILS 0 0 - 1 %    IMMATURE GRANULOCYTES 0 0.0 - 0.5 %    ABS. NEUTROPHILS 6.6 1.8 - 8.0 K/UL    ABS. LYMPHOCYTES 1.0 0.8 - 3.5 K/UL    ABS. MONOCYTES 0.6 0.0 - 1.0 K/UL    ABS. EOSINOPHILS 0.2 0.0 - 0.4 K/UL    ABS. BASOPHILS 0.0 0.0 - 0.1 K/UL    ABS. IMM. GRANS. 0.0 0.00 - 0.04 K/UL    DF AUTOMATED     METABOLIC PANEL, COMPREHENSIVE    Collection Time: 08/22/18  5:24 AM   Result Value Ref Range    Sodium 136 136 - 145 mmol/L    Potassium 3.6 3.5 - 5.1 mmol/L    Chloride 103 97 - 108 mmol/L    CO2 23 21 - 32 mmol/L    Anion gap 10 5 - 15 mmol/L    Glucose 81 65 - 100 mg/dL    BUN 6 6 - 20 MG/DL    Creatinine 0.57 (L) 0.70 - 1.30 MG/DL    BUN/Creatinine ratio 11 (L) 12 - 20      GFR est AA >60 >60 ml/min/1.73m2    GFR est non-AA >60 >60 ml/min/1.73m2    Calcium 8.1 (L) 8.5 - 10.1 MG/DL    Bilirubin, total 1.3 (H) 0.2 - 1.0 MG/DL    ALT (SGPT) 24 12 - 78 U/L    AST (SGOT) 25 15 - 37 U/L    Alk.  phosphatase 65 45 - 117 U/L    Protein, total 6.3 (L) 6.4 - 8.2 g/dL    Albumin 2.6 (L) 3.5 - 5.0 g/dL    Globulin 3.7 2.0 - 4.0 g/dL    A-G Ratio 0.7 (L) 1.1 - 2.2     MAGNESIUM    Collection Time: 08/22/18  5:24 AM   Result Value Ref Range    Magnesium 1.7 1.6 - 2.4 mg/dL   PHOSPHORUS    Collection Time: 08/22/18  5:24 AM   Result Value Ref Range    Phosphorus 2.1 (L) 2.6 - 4.7 MG/DL   LACTIC ACID    Collection Time: 08/22/18  5:24 AM   Result Value Ref Range    Lactic acid 0.6 0.4 - 2.0 MMOL/L   LIPASE    Collection Time: 08/22/18  5:24 AM   Result Value Ref Range    Lipase 112 73 - 393 U/L   TROPONIN I    Collection Time: 08/22/18  5:24 AM   Result Value Ref Range    Troponin-I, Qt. <0.05 <0.05 ng/mL     Recent Labs      08/22/18   0524  08/21/18   0258  08/20/18   0334  08/19/18   1444   NA  136  139  136  131*   K  3.6  3.1*  3.9  3.0*   CL  103  106  103  93*   CO2  23  26  26  29   BUN  6  5*  8  14   CREA  0.57*  0.63*  0.61*  0.75   GLU  81  86  91  115*   CA  8.1*  8.2*  7.9*  9.1   MG  1.7   --   1.9  1.9   PHOS  2.1*  2.0*  1.4*   --      Recent Labs      08/20/18   1215   INR  1.0   PTP  10.3     Recent Labs      08/22/18   0524  08/20/18   0334  08/19/18   1444   SGOT  25  46*  37   AP  65  63  91   TP  6.3*  6.2*  8.1   ALB  2.6*  2.7*  3.9   GLOB  3.7  3.5  4.2*   LPSE  112   --   129       Impression:    N/V prior to admission with some hematemesis  Alcohol withdrawal on CIWA protocol  AMS       Plan:  No signs of active GI bleeding and pt starting to be more lucid and tolerating clear liquid diet thus far. Will plan on EGD in a day or two if he continues to improve.        Nadege Buenrostro MD    8/22/2018  3500  [address] [place] [address], [address] [place] [address]  [phone]  [address] [place] [address] South: [phone]

## 2018-08-22 NOTE — PROGRESS NOTES
Problem: Mobility Impaired (Adult and Pediatric)  Goal: *Acute Goals and Plan of Care (Insert Text)  Physical Therapy Goals  Initiated 8/21/2018  1. Patient will move from supine to sit and sit to supine  in bed with independence within 7 day(s). 2.  Patient will transfer from bed to chair and chair to bed with minimal assistance/contact guard assist using the least restrictive device within 7 day(s). 3.  Patient will perform sit to stand with independence within 7 day(s). 4.  Patient will ambulate with minimal assistance/contact guard assist for 150 feet with the least restrictive device within 7 day(s). 5.  Patient will ascend/descend 4 stairs with 1 handrail(s) with minimal assistance/contact guard assist within 7 day(s). physical Therapy TREATMENT  Patient: Sinai Robles Sr. (64 y.o. male)  Date: 8/22/2018  Diagnosis: Vomiting  SIRS (systemic inflammatory response syndrome) (HCC)  GIB (gastrointestinal bleeding) <principal problem not specified>       Precautions:    Chart, physical therapy assessment, plan of care and goals were reviewed. ASSESSMENT:  Pt was received in supine and cleared by nursing to mobilize. He is confused and inappropriate comments at times. Very impulsive and extremely poor safety awareness. Bed mobility was performed with SBA to come to the EOB. Fair sitting balance, at times he matthew lean very far forward and needing min A to maintain balance at the EOB. Attempted to stand multiple times impulsively and when therapists were ready he was not able to stand. RW was brought in front and height of the bed was raised. He was able to come to stand with mod A x 2. Noted increased trunk flexion in standing which he was able to correct, but not maintain. Ambulated a few steps forward with RW and min/mod A x 2, pt was unable to progress the RLE forward and needed to drag it. Pt was not able to perform retro walking and the bed need to be brought up behind him.  He was returned to supine at the end of the session with max A . Recommending SNF. Progression toward goals:  []    Improving appropriately and progressing toward goals  [x]    Improving slowly and progressing toward goals  []    Not making progress toward goals and plan of care will be adjusted     PLAN:  Patient continues to benefit from skilled intervention to address the above impairments. Continue treatment per established plan of care. Discharge Recommendations:  [facility]  Further Equipment Recommendations for Discharge:  TBD     SUBJECTIVE:   Patient stated Galina Cho you Presybeterian? Then you are not going to heaven .  When author responded \"no\" to the question     OBJECTIVE DATA SUMMARY:   Critical Behavior:  Neurologic State: Alert  Orientation Level: Oriented to person, Oriented to place, Disoriented to time, Disoriented to situation  Cognition: Decreased attention/concentration, Decreased command following, Impulsive, Poor safety awareness  Safety/Judgement: Fall prevention  Functional Mobility Training:  Bed Mobility:  Rolling: Supervision  Supine to Sit: Stand-by assistance  Sit to Supine: Maximum assistance  Scooting: Maximum assistance;Assist x2        Transfers:  Sit to Stand: Moderate assistance;Assist x2; Additional time; Adaptive equipment (RW)           Bed to Chair: Moderate assistance;Assist x2                    Balance:  Sitting: Intact; High guard  Standing: Impaired; With support (RW)  Standing - Static: Constant support;Poor  Standing - Dynamic : Poor  Ambulation/Gait Training:  Distance (ft): 2 Feet (ft)  Assistive Device: Gait belt;Walker, rolling  Ambulation - Level of Assistance: Minimal assistance;Assist x2        Gait Abnormalities: Decreased step clearance (unable to proress R foot)        Base of Support: Widened     Speed/Sheila: Pace decreased (<100 feet/min); Shuffled  Step Length: Left shortened;Right shortened                Pain:  Pain Scale 1: Numeric (0 - 10)  Pain Intensity 1: 0  Pain Location 1: Rib cage  Pain Orientation 1: Left  Pain Description 1: Joel Bitters; Sore  Pain Intervention(s) 1: Medication (see MAR); Repositioned; Rest  Activity Tolerance:   VSS  Please refer to the flowsheet for vital signs taken during this treatment.   After treatment:   []    Patient left in no apparent distress sitting up in chair  [x]    Patient left in no apparent distress in bed  [x]    Call bell left within reach  [x]    Nursing notified  []    Caregiver present  [x]    Bed alarm activated    COMMUNICATION/COLLABORATION:   The patients plan of care was discussed with: Occupational Therapist and Registered Nurse    Raquel Voss PT, DPT   Time Calculation: 31 mins

## 2018-08-22 NOTE — PROGRESS NOTES
Problem: Self Care Deficits Care Plan (Adult)  Goal: *Acute Goals and Plan of Care (Insert Text)  Occupational Therapy Goals  Initiated 8/21/2018    1. Patient will perform simple grooming tasks in upright position with Min A x1 for at least 3 minutes within 7 days. 2.  Patient will complete bathing in chair with Mod Ax1 within 7 days. 3.  Patient will perform upper body dressing with supervision/setup within 7 days. 4.  Patient will perform toilet transfer with overall Min A x2 and least restrictive device PRN within 7 days. 5.  Patient will follow one step simple command with 100% accuracy during simple ADL within 7 days. 6.  Patient will participate in upper body therapeutic exercises/activities with supervision/setup for at least 5 minutes within 7 days. Occupational Therapy TREATMENT  Patient: Sherry Barry Sr. (64 y.o. male)  Date: 8/22/2018  Diagnosis: Vomiting  SIRS (systemic inflammatory response syndrome) (HCC)  GIB (gastrointestinal bleeding) <principal problem not specified>       Precautions:    Chart, occupational therapy assessment, plan of care, and goals were reviewed. ASSESSMENT:  Cleared by RN to see pt for therapy session. Pt received supine in bed, agreeable to participate. Pt demonstrated confusion during session, often with tangential or incongruent speech. Needed frequent redirection to task and max cueing to follow commands. In semisupine pt washed face with verbal/tactile cueing for initiation. Did demonstrate improved bed mobility today, transferring supine>sit with standby assist. Fair sitting balance EOB, pt attempted LB dressing ADL but needed max A due to safety concerns with far anterior lean. Attempted multiple times to stand without therapist assistance despite cueing. Pt needed mod A x2 to RW to stand from bedside, demonstrated forward flexed posture that he could briefly correct with tactile/verbal cueing.  Took 2 steps forward, pt with great difficulty advancing RLE and was unable to take steps backward. Bed brought behind pt and he was assisted back to supine with max A. Pt in bed at end of session, call bell in reach and alarm set. Pt's functional performance limited at this time by generalized weakness, decreased RLE ROM and strength, decreased attention and command following and poor safety awareness. He requires assist x2 for mobility and setup-total A for ADLs. Recommend SNF at discharge. Progression toward goals:  []       Improving appropriately and progressing toward goals  [x]       Improving slowly and progressing toward goals  []       Not making progress toward goals and plan of care will be adjusted     PLAN:  Patient continues to benefit from skilled intervention to address the above impairments. Continue treatment per established plan of care. Discharge Recommendations:  Skilled Nursing Facility  Further Equipment Recommendations for Discharge:  TBD at SNF     SUBJECTIVE:   Patient stated [facility] can I do better at home? Shamar Koch    OBJECTIVE DATA SUMMARY:   Cognitive/Behavioral Status:  Neurologic State: Alert  Orientation Level: Oriented to person;Oriented to place; Disoriented to time;Disoriented to situation  Cognition: Decreased attention/concentration;Decreased command following; Impulsive;Poor safety awareness  Perception: Cues to maintain midline in standing  Perseveration: Perseverates during conversation  Safety/Judgement: Fall prevention    Functional Mobility and Transfers for ADLs:  Bed Mobility:  Rolling: Supervision  Supine to Sit: Stand-by assistance  Sit to Supine: Maximum assistance  Scooting: Maximum assistance;Assist x2    Transfers:  Sit to Stand: Moderate assistance;Assist x2; Additional time; Adaptive equipment (RW)         Balance:  Sitting: Intact; High guard  Standing: Impaired; With support (RW)  Standing - Static: Constant support;Poor  Standing - Dynamic : Poor    ADL Intervention:     Educated pt on importance of participation in ADLs and functional mobility and exercise to prevent decondition and functional decline. Grooming  Washing Face: Supervision/set-up (supported sit)  Cues: Verbal cues provided;Visual cues provided         Lower Body Dressing Assistance  Socks: Maximum assistance  Position Performed: Seated edge of bed         Cognitive Retraining  Safety/Judgement: Fall prevention    Pain:  Pain Scale 1: Numeric (0 - 10)  Pain Intensity 1: 0  Pain Location 1: Rib cage  Pain Orientation 1: Left  Pain Description 1: Sharp; Sore  Pain Intervention(s) 1: Medication (see MAR); Repositioned; Rest  Activity Tolerance:   Fair  Please refer to the flowsheet for vital signs taken during this treatment.   After treatment:   [] Patient left in no apparent distress sitting up in chair  [x] Patient left in no apparent distress in bed  [x] Call bell left within reach  [x] Nursing notified  [] Caregiver present  [x] Bed alarm activated    COMMUNICATION/COLLABORATION:   The patients plan of care was discussed with: Physical Therapist and Registered Nurse    Karen Ferguson OT  Time Calculation: 31 mins
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
## 2018-08-23 ENCOUNTER — ANESTHESIA (OUTPATIENT)
Dept: ENDOSCOPY | Age: 63
DRG: 380 | End: 2018-08-23
Payer: MEDICARE

## 2018-08-23 ENCOUNTER — ANESTHESIA EVENT (OUTPATIENT)
Dept: ENDOSCOPY | Age: 63
DRG: 380 | End: 2018-08-23
Payer: MEDICARE

## 2018-08-23 LAB
ANION GAP SERPL CALC-SCNC: 8 MMOL/L (ref 5–15)
BASOPHILS # BLD: 0 K/UL (ref 0–0.1)
BASOPHILS NFR BLD: 0 % (ref 0–1)
BUN SERPL-MCNC: 5 MG/DL (ref 6–20)
BUN/CREAT SERPL: 9 (ref 12–20)
CALCIUM SERPL-MCNC: 8.2 MG/DL (ref 8.5–10.1)
CHLORIDE SERPL-SCNC: 101 MMOL/L (ref 97–108)
CO2 SERPL-SCNC: 24 MMOL/L (ref 21–32)
CREAT SERPL-MCNC: 0.53 MG/DL (ref 0.7–1.3)
DIFFERENTIAL METHOD BLD: ABNORMAL
EOSINOPHIL # BLD: 0.2 K/UL (ref 0–0.4)
EOSINOPHIL NFR BLD: 3 % (ref 0–7)
ERYTHROCYTE [DISTWIDTH] IN BLOOD BY AUTOMATED COUNT: 11.4 % (ref 11.5–14.5)
GLUCOSE SERPL-MCNC: 103 MG/DL (ref 65–100)
HCT VFR BLD AUTO: 36.8 % (ref 36.6–50.3)
HGB BLD-MCNC: 13.1 G/DL (ref 12.1–17)
IMM GRANULOCYTES # BLD: 0 K/UL (ref 0–0.04)
IMM GRANULOCYTES NFR BLD AUTO: 1 % (ref 0–0.5)
LYMPHOCYTES # BLD: 1.2 K/UL (ref 0.8–3.5)
LYMPHOCYTES NFR BLD: 13 % (ref 12–49)
MCH RBC QN AUTO: 34.5 PG (ref 26–34)
MCHC RBC AUTO-ENTMCNC: 35.6 G/DL (ref 30–36.5)
MCV RBC AUTO: 96.8 FL (ref 80–99)
MONOCYTES # BLD: 0.9 K/UL (ref 0–1)
MONOCYTES NFR BLD: 10 % (ref 5–13)
NEUTS SEG # BLD: 6.5 K/UL (ref 1.8–8)
NEUTS SEG NFR BLD: 73 % (ref 32–75)
NRBC # BLD: 0 K/UL (ref 0–0.01)
NRBC BLD-RTO: 0 PER 100 WBC
PLATELET # BLD AUTO: 182 K/UL (ref 150–400)
PMV BLD AUTO: 10.6 FL (ref 8.9–12.9)
POTASSIUM SERPL-SCNC: 3.7 MMOL/L (ref 3.5–5.1)
RBC # BLD AUTO: 3.8 M/UL (ref 4.1–5.7)
SODIUM SERPL-SCNC: 133 MMOL/L (ref 136–145)
WBC # BLD AUTO: 8.8 K/UL (ref 4.1–11.1)

## 2018-08-23 PROCEDURE — 74011250636 HC RX REV CODE- 250/636: Performed by: INTERNAL MEDICINE

## 2018-08-23 PROCEDURE — 76040000019: Performed by: SPECIALIST

## 2018-08-23 PROCEDURE — 77030011256 HC DRSG MEPILEX <16IN NO BORD MOLN -A

## 2018-08-23 PROCEDURE — 85025 COMPLETE CBC W/AUTO DIFF WBC: CPT | Performed by: NURSE PRACTITIONER

## 2018-08-23 PROCEDURE — 65660000000 HC RM CCU STEPDOWN

## 2018-08-23 PROCEDURE — 88342 IMHCHEM/IMCYTCHM 1ST ANTB: CPT | Performed by: SPECIALIST

## 2018-08-23 PROCEDURE — 77030037878 HC DRSG MEPILEX >48IN BORD MOLN -B

## 2018-08-23 PROCEDURE — 0DB68ZX EXCISION OF STOMACH, VIA NATURAL OR ARTIFICIAL OPENING ENDOSCOPIC, DIAGNOSTIC: ICD-10-PCS | Performed by: SPECIALIST

## 2018-08-23 PROCEDURE — 36415 COLL VENOUS BLD VENIPUNCTURE: CPT | Performed by: NURSE PRACTITIONER

## 2018-08-23 PROCEDURE — 74011000250 HC RX REV CODE- 250

## 2018-08-23 PROCEDURE — 74011250637 HC RX REV CODE- 250/637: Performed by: NURSE PRACTITIONER

## 2018-08-23 PROCEDURE — 76060000031 HC ANESTHESIA FIRST 0.5 HR: Performed by: SPECIALIST

## 2018-08-23 PROCEDURE — 74011000258 HC RX REV CODE- 258: Performed by: INTERNAL MEDICINE

## 2018-08-23 PROCEDURE — 74011250636 HC RX REV CODE- 250/636

## 2018-08-23 PROCEDURE — 88305 TISSUE EXAM BY PATHOLOGIST: CPT | Performed by: SPECIALIST

## 2018-08-23 PROCEDURE — 74011250636 HC RX REV CODE- 250/636: Performed by: NURSE PRACTITIONER

## 2018-08-23 PROCEDURE — 80048 BASIC METABOLIC PNL TOTAL CA: CPT | Performed by: NURSE PRACTITIONER

## 2018-08-23 PROCEDURE — 77030019988 HC FCPS ENDOSC DISP BSC -B: Performed by: SPECIALIST

## 2018-08-23 PROCEDURE — 74011250636 HC RX REV CODE- 250/636: Performed by: SPECIALIST

## 2018-08-23 RX ORDER — SODIUM CHLORIDE 9 MG/ML
50 INJECTION, SOLUTION INTRAVENOUS CONTINUOUS
Status: DISPENSED | OUTPATIENT
Start: 2018-08-23 | End: 2018-08-23

## 2018-08-23 RX ORDER — GLYCOPYRROLATE 0.2 MG/ML
INJECTION INTRAMUSCULAR; INTRAVENOUS AS NEEDED
Status: DISCONTINUED | OUTPATIENT
Start: 2018-08-23 | End: 2018-08-23 | Stop reason: HOSPADM

## 2018-08-23 RX ORDER — FLUMAZENIL 0.1 MG/ML
0.2 INJECTION INTRAVENOUS
Status: CANCELLED | OUTPATIENT
Start: 2018-08-23 | End: 2018-08-23

## 2018-08-23 RX ORDER — MIDAZOLAM HYDROCHLORIDE 1 MG/ML
.25-5 INJECTION, SOLUTION INTRAMUSCULAR; INTRAVENOUS
Status: CANCELLED | OUTPATIENT
Start: 2018-08-23 | End: 2018-08-23

## 2018-08-23 RX ORDER — NALOXONE HYDROCHLORIDE 0.4 MG/ML
0.4 INJECTION, SOLUTION INTRAMUSCULAR; INTRAVENOUS; SUBCUTANEOUS
Status: CANCELLED | OUTPATIENT
Start: 2018-08-23 | End: 2018-08-23

## 2018-08-23 RX ORDER — PROPOFOL 10 MG/ML
INJECTION, EMULSION INTRAVENOUS AS NEEDED
Status: DISCONTINUED | OUTPATIENT
Start: 2018-08-23 | End: 2018-08-23 | Stop reason: HOSPADM

## 2018-08-23 RX ORDER — FENTANYL CITRATE 50 UG/ML
50 INJECTION, SOLUTION INTRAMUSCULAR; INTRAVENOUS
Status: CANCELLED | OUTPATIENT
Start: 2018-08-23 | End: 2018-08-23

## 2018-08-23 RX ORDER — PANTOPRAZOLE SODIUM 40 MG/1
40 TABLET, DELAYED RELEASE ORAL
Status: DISCONTINUED | OUTPATIENT
Start: 2018-08-23 | End: 2018-08-24 | Stop reason: HOSPADM

## 2018-08-23 RX ORDER — DEXTROMETHORPHAN/PSEUDOEPHED 2.5-7.5/.8
1.2 DROPS ORAL
Status: DISCONTINUED | OUTPATIENT
Start: 2018-08-23 | End: 2018-08-23 | Stop reason: HOSPADM

## 2018-08-23 RX ORDER — SODIUM CHLORIDE 0.9 % (FLUSH) 0.9 %
5-10 SYRINGE (ML) INJECTION AS NEEDED
Status: CANCELLED | OUTPATIENT
Start: 2018-08-23 | End: 2018-08-23

## 2018-08-23 RX ORDER — LIDOCAINE HYDROCHLORIDE 20 MG/ML
INJECTION, SOLUTION EPIDURAL; INFILTRATION; INTRACAUDAL; PERINEURAL AS NEEDED
Status: DISCONTINUED | OUTPATIENT
Start: 2018-08-23 | End: 2018-08-23 | Stop reason: HOSPADM

## 2018-08-23 RX ORDER — IPRATROPIUM BROMIDE AND ALBUTEROL SULFATE 2.5; .5 MG/3ML; MG/3ML
3 SOLUTION RESPIRATORY (INHALATION)
Status: DISCONTINUED | OUTPATIENT
Start: 2018-08-23 | End: 2018-08-23

## 2018-08-23 RX ORDER — SODIUM CHLORIDE 0.9 % (FLUSH) 0.9 %
5-10 SYRINGE (ML) INJECTION AS NEEDED
Status: ACTIVE | OUTPATIENT
Start: 2018-08-23 | End: 2018-08-23

## 2018-08-23 RX ORDER — SODIUM CHLORIDE 0.9 % (FLUSH) 0.9 %
5-10 SYRINGE (ML) INJECTION EVERY 8 HOURS
Status: CANCELLED | OUTPATIENT
Start: 2018-08-23 | End: 2018-08-23

## 2018-08-23 RX ORDER — SODIUM CHLORIDE 0.9 % (FLUSH) 0.9 %
5-10 SYRINGE (ML) INJECTION EVERY 8 HOURS
Status: COMPLETED | OUTPATIENT
Start: 2018-08-23 | End: 2018-08-23

## 2018-08-23 RX ADMIN — Medication 10 ML: at 17:04

## 2018-08-23 RX ADMIN — PANTOPRAZOLE SODIUM 40 MG: 40 TABLET, DELAYED RELEASE ORAL at 17:03

## 2018-08-23 RX ADMIN — GLYCOPYRROLATE 0.1 MG: 0.2 INJECTION INTRAMUSCULAR; INTRAVENOUS at 12:45

## 2018-08-23 RX ADMIN — THIAMINE HYDROCHLORIDE 100 MG: 100 INJECTION, SOLUTION INTRAMUSCULAR; INTRAVENOUS at 10:36

## 2018-08-23 RX ADMIN — NYSTATIN 500000 UNITS: 100000 SUSPENSION ORAL at 17:03

## 2018-08-23 RX ADMIN — PROPOFOL 220 MG: 10 INJECTION, EMULSION INTRAVENOUS at 13:12

## 2018-08-23 RX ADMIN — SODIUM CHLORIDE AND POTASSIUM CHLORIDE: 9; 2.98 INJECTION, SOLUTION INTRAVENOUS at 02:16

## 2018-08-23 RX ADMIN — LORAZEPAM 2 MG: 2 INJECTION INTRAMUSCULAR; INTRAVENOUS at 18:08

## 2018-08-23 RX ADMIN — SODIUM CHLORIDE 50 ML/HR: 900 INJECTION, SOLUTION INTRAVENOUS at 11:40

## 2018-08-23 RX ADMIN — LIDOCAINE HYDROCHLORIDE 100 MG: 20 INJECTION, SOLUTION EPIDURAL; INFILTRATION; INTRACAUDAL; PERINEURAL at 12:45

## 2018-08-23 RX ADMIN — Medication 10 ML: at 21:34

## 2018-08-23 NOTE — PROGRESS NOTES
0730  Report received from [place], [facility]. SBAR, Kardex, ED Summary, Procedure Summary, Intake/Output, MAR, Accordion, Recent Results, Med Rec Status, Cardiac Rhythm NSR and Alarm Parameters  were discussed. Marcia Ramsey    Patient oriented x 3 today. Mild confusion on location but quickly corrected himself. CIWA 1. Patient NPO awaiting EGD. Consents signed and on the chart. Master schedule states 1230 today. Patient has had hiccups since the beginning of shift. Persistent coughing but non-productive. 1130  Patient off floor to [facility]. 1830  TRANSFER - OUT REPORT:    Verbal report given to Malka (name) on [employer] Sr.  being transferred to neuro(unit) for routine progression of care       Report consisted of patients Situation, Background, Assessment and   Recommendations(SBAR). Information from the following report(s) SBAR, Kardex, ED Summary, Procedure Summary, Intake/Output, MAR, Accordion, Recent Results, Med Rec Status and Cardiac Rhythm NSR was reviewed with the receiving nurse. Lines:   Peripheral IV 08/22/18 Right Forearm (Active)   Site Assessment Clean, dry, & intact 8/23/2018  4:00 PM   Phlebitis Assessment 0 8/23/2018  4:00 PM   Infiltration Assessment 0 8/23/2018  4:00 PM   Dressing Status Clean, dry, & intact 8/23/2018  4:00 PM   Dressing Type Tape;Transparent 8/23/2018  4:00 PM   Hub Color/Line Status Blue; Infusing 8/23/2018  4:00 PM       Peripheral IV 08/23/18 Left Wrist (Active)   Site Assessment Clean, dry, & intact 8/23/2018  4:00 PM   Phlebitis Assessment 0 8/23/2018  4:00 PM   Infiltration Assessment 0 8/23/2018  4:00 PM   Dressing Status Clean, dry, & intact 8/23/2018  4:00 PM   Dressing Type Tape;Transparent 8/23/2018  4:00 PM   Hub Color/Line Status Flushed 8/23/2018  4:00 PM        Opportunity for questions and clarification was provided.       Patient transported with:   Monitor  Registered Nurse

## 2018-08-23 NOTE — PROGRESS NOTES
Speech pathology note  9:05: Reviewed chart and note patient NPO for EGD today. Will continue to follow for diet tolerance and liberalization when cleared by MD. Thank you. Addendum 14:28: EGD completed and patient cleared to resume PO intake per GI. Attempted to see patient for diet tolerance, however patient reported nausea and refused. Will follow up tomorrow.     Isha Fragoso., CCC-SLP

## 2018-08-23 NOTE — PROGRESS NOTES
9:00PM    Reason for Admission:   Vomiting; SIRS                   RRAT Score:          8           Plan for utilizing home health:      TBD                    Likelihood of Readmission:  Moderate                          Transition of Care Plan:      SNF v. HH    CM met with pt to complete assessment and discuss d/c planning. Pt unable to communicate coherently when first admitted but is now A&O x4. Pt is a 69 yo male admitted vomiting and SIRS. Pt lives alone with friends/family nearby. Pt names his Josue Raven, as his primary support. At baseline, pt is independent with ADLs and IADLs, including driving. He states that he owns a RW and cane but does not use them. He also has 3 WCs at home that he uses \"for convenience\" at home. Pt states that he had HH in the past but denies previous SNF experience. Pt shared that his wife  last November and they are having a  for her on . Pt states that he started smoking again after his wife's death. CM engaged pt in lengthy conversation about his alcohol use, integrating some motivational interviewing skills. Pt acknowledged that alcohol has been a problem for him since childhood and he has made plans to stop. CM discussed AA which pt has done before and did not find helpful. Pt gave mixed messages about his awareness of the problem and plans to stop drinking. CM encouraged him to stick to his plan and utilize his resources: family support, extracurricular activities [facility]). Pt adamant that he will stop drinking. CM discussed PT/OT recommendations for SNF. Pt adamant that he will return home for his wife's . CM discussed possibility of being admitted to SNF from home. Pt sounded agreeable and stated he would choose [facility]. CM PC to [address] [place] [address] with [place] to discuss. [address] [place] [address] coming to the hospital to discuss with pt and determine possible admission plan. CM will continue to follow and assist with d/c planning.      Care Management Interventions  PCP Verified by CM: Yes Himanshu Curran,  )  Mode of Transport at Discharge:  Other (see comment) (Pt's [facility])  Transition of Care Consult (CM Consult): Discharge Planning  Discharge Durable Medical Equipment: No  Physical Therapy Consult: Yes  Occupational Therapy Consult: Yes  Speech Therapy Consult: Yes  Current Support Network: Own Home, Lives Alone  Confirm Follow Up Transport: Self  Plan discussed with Pt/Family/Caregiver: Yes  Discharge Location  Discharge Placement:  (TBD)      TATI Menendez  Care Manager

## 2018-08-23 NOTE — PROGRESS NOTES
Hospitalist Progress Note    NAME: Manjula Castellon Sr.   :  1955   MRN:  [id number]       Interim Hospital Summary: 70 y.o. male whom presented on 2018 with      Assessment / Plan:  Nausea/Vomiting   Hematemesis POA  Dehydration secondary to vomiting   Esophagitis  - consistent throat clearing and cough  - appreciate SPL input; Full liquid diet     Sit upright for all PO     Small bites     Single sips     Medication whole or crushed as tolerated  - no further hematemesis  - H&H (.) stable  - appreciate GI input; EGD revealed esophagitis. Continue with PPI BID  - continue with IVF per GI.      Alcohol withdrawal  Acute metabolic encephalopathy  Alcohol abuse - has 6 pack daily. - much more alert today. Continue with thiamin, folate, multivitiamin  - continue with CIWA  - discussed about importance smoking/THC and ETOH cessation. Pt agreed  - Tox screen (+) for THC      Non-infectious SIRS - tachycardia and leukocytosis on admission (resolved)  - WBC normalized. No signs of active infection.  Leukocytosis likely reactive. - lactic acid normal.  - blood cx no growth so far      Electrolyte imbalance (K, Mg, PO4)  - repleted;will continue to monitor      Mild hyperglycemia on admission  - A1c 4.4  - normal TSH, free T4  - AM cortisol 16.9  - glucose stayed 's      History of Rheumatic Fever and Polio      Tobacco Use:  - nicotine patch prn      Body mass index is 23.6 kg/(m^2).     Code status: DNR  Prophylaxis: SCD's  Recommended Disposition:  with 24/7 supervision       Subjective:     Chief Complaint / Reason for Physician Visit  \"I am thirsty\". Pt was seen prior to EGD. Discussed with RN events overnight.      Review of Systems:  Symptom Y/N Comments  Symptom Y/N Comments   Fever/Chills n   Chest Pain n    Poor Appetite    Edema     Cough    Abdominal Pain n    Sputum    Joint Pain     SOB/SPAULDING    Pruritis/Rash     Nausea/vomit n   Tolerating PT/OT     Diarrhea    Tolerating Diet     Constipation    Other       Could NOT obtain due to:      Objective:     VITALS:   Last 24hrs VS reviewed since prior progress note. Most recent are:  Patient Vitals for the past 24 hrs:   Temp Pulse Resp BP SpO2   08/23/18 1331 - 94 30 129/75 97 %   08/23/18 1328 - 94 (!) 36 132/74 98 %   08/23/18 1326 - 96 26 133/72 98 %   08/23/18 1325 - 99 23 (!) 137/97 98 %   08/23/18 1320 - 99 18 126/84 97 %   08/23/18 1317 - 97 26 (!) 144/107 97 %   08/23/18 1313 - 97 18 133/83 97 %   08/23/18 1138 98 °F (36.7 °C) 92 16 117/77 98 %   08/23/18 1043 98.5 °F (36.9 °C) 75 18 148/63 100 %   08/23/18 0732 99.5 °F (37.5 °C) 78 18 156/67 97 %   08/23/18 0215 99.6 °F (37.6 °C) 78 18 158/73 99 %   08/22/18 2241 98.7 °F (37.1 °C) 74 14 154/70 97 %   08/22/18 2049 99.7 °F (37.6 °C) 76 14 162/66 97 %   08/22/18 1959 99.3 °F (37.4 °C) 87 20 156/66 98 %   08/22/18 1519 98.4 °F (36.9 °C) 82 18 138/66 99 %       Intake/Output Summary (Last 24 hours) at 08/23/18 1458  Last data filed at 08/23/18 1314   Gross per 24 hour   Intake          5303.33 ml   Output             2475 ml   Net          2828.33 ml        PHYSICAL EXAM:  General: Ill appearing. Alert, cooperative, no acute distress    EENT:  EOMI. Anicteric sclerae. MMM  Resp:  CTA bilaterally, no wheezing or rales. No accessory muscle use  CV:  Regular  rhythm,  No edema  GI:  Soft, Non distended, Non tender.  +Bowel sounds  Neurologic:  Alert and oriented X 3, normal speech,   Psych:   Fair insight. Not anxious nor agitated  Skin:  No rashes.   No jaundice    Reviewed most current lab test results and cultures  YES  Reviewed most current radiology test results   YES  Review and summation of old records today    NO  Reviewed patient's current orders and MAR    YES  PMH/ reviewed - no change compared to H&P  ________________________________________________________________________  Care Plan discussed with:    Comments   Patient y    Family      RN y    Care Manager y Consultant                       y Multidiciplinary team rounds were held today with , nursing, pharmacist and clinical coordinator. Patient's plan of care was discussed; medications were reviewed and discharge planning was addressed. ________________________________________________________________________  Total NON critical care TIME: 30   Minutes    Total CRITICAL CARE TIME Spent:   Minutes non procedure based      Comments   >50% of visit spent in counseling and coordination of care     ________________________________________________________________________  Nataly Ritter NP     Procedures: see electronic medical records for all procedures/Xrays and details which were not copied into this note but were reviewed prior to creation of Plan. LABS:  I reviewed today's most current labs and imaging studies.   Pertinent labs include:  Recent Labs      08/23/18   0205  08/22/18   0524   WBC  8.8  8.4   HGB  13.1  12.6   HCT  36.8  36.5*   PLT  182  155     Recent Labs      08/23/18   0205  08/22/18   0524  08/21/18   0258   NA  133*  136  139   K  3.7  3.6  3.1*   CL  101  103  106   CO2  24  23  26   GLU  103*  81  86   BUN  5*  6  5*   CREA  0.53*  0.57*  0.63*   CA  8.2*  8.1*  8.2*   MG   --   1.7   --    PHOS   --   2.1*  2.0*   ALB   --   2.6*   --    TBILI   --   1.3*   --    SGOT   --   25   --    ALT   --   24   --        Signed: )Christiane Jernigan NP

## 2018-08-23 NOTE — PROGRESS NOTES
Attempted to see pt for PT tx. Currently off the floor to EGD. Will defer and continue to follow. Discussed with CM who reports pt is adamantly refusing SNF until after wife's . Elaine Galdamez he chart he may be agreeable to go to SNF after. He will needed assistance at home and constant supervision, major safety concerns with pt ambulation. Recommend wheelchair transfers only until able to get rehab if discharged home. Still overall recommendation is SNF.

## 2018-08-23 NOTE — PERIOP NOTES
Anesthesia reports 220mg Propofol, 100mg Lidocaine 0.1 mg Robinul and 850mL NS given during procedure. Received report from anesthesia staff on vital signs and status of patient.

## 2018-08-23 NOTE — ANESTHESIA PREPROCEDURE EVALUATION
Anesthetic History   No history of anesthetic complications            Review of Systems / Medical History  Patient summary reviewed, nursing notes reviewed and pertinent labs reviewed    Pulmonary  Within defined limits                 Neuro/Psych   Within defined limits           Cardiovascular  Within defined limits                Exercise tolerance: <4 METS     GI/Hepatic/Renal  Within defined limits              Endo/Other  Within defined limits      Arthritis     Other Findings   Comments: Hx polio  Hx rheumatic heart disease  ETOH abuse  GI Bleed           Physical Exam    Airway  Mallampati: II  TM Distance: 4 - 6 cm  Neck ROM: normal range of motion   Mouth opening: Normal     Cardiovascular  Regular rate and rhythm,  S1 and S2 normal,  no murmur, click, rub, or gallop             Dental  No notable dental hx       Pulmonary  Breath sounds clear to auscultation               Abdominal  GI exam deferred       Other Findings            Anesthetic Plan    ASA: 3  Anesthesia type: general and total IV anesthesia          Induction: Intravenous  Anesthetic plan and risks discussed with: Patient      Propofol MAC

## 2018-08-23 NOTE — PROGRESS NOTES
Bedside shift change report given to Pradip Chawla RN (oncoming nurse) by Prashant Jackson RN (offgoing nurse). Report included the following information SBAR, Kardex, Intake/Output and Cardiac Rhythm NSR.

## 2018-08-23 NOTE — PROGRESS NOTES
Problem: Pressure Injury - Risk of  Goal: *Prevention of pressure injury  Document Shiva Scale and appropriate interventions in the flowsheet. Outcome: Progressing Towards Goal  Pressure Injury Interventions:  Sensory Interventions: Assess changes in LOC, Assess need for specialty bed, Check visual cues for pain, Discuss PT/OT consult with provider, Float heels, Keep linens dry and wrinkle-free, Minimize linen layers, Turn and reposition approx. every two hours (pillows and wedges if needed)    Moisture Interventions: Absorbent underpads, Apply protective barrier, creams and emollients, Assess need for specialty bed, Check for incontinence Q2 hours and as needed, Internal/External urinary devices, Maintain skin hydration (lotion/cream), Limit adult briefs, Offer toileting Q_hr, Moisture barrier    Activity Interventions: Assess need for specialty bed, Increase time out of bed, Pressure redistribution bed/mattress(bed type), PT/OT evaluation    Mobility Interventions: Assess need for specialty bed, Float heels, HOB 30 degrees or less, Pressure redistribution bed/mattress (bed type), PT/OT evaluation, Turn and reposition approx.  every two hours(pillow and wedges)    Nutrition Interventions: Document food/fluid/supplement intake    Friction and Shear Interventions: Apply protective barrier, creams and emollients, Feet elevated on foot rest, HOB 30 degrees or less, Lift sheet, Lift team/patient mobility team, Minimize layers, Sit at 90-degree angle, Transfer aides:transfer board/Alban lift/ceiling lift, Transferring/repositioning devices

## 2018-08-23 NOTE — PROCEDURES
Esophagogastroduodenoscopy Procedure Note      Owen Wilson Sr.  1955  [id number]    Indication:  Hematemesis     Endoscopist: Alivia Clark MD    Referring Provider:  Viral Campbell DO    Sedation:  MAC anesthesia Propofol    Procedure Details:  After infomed consent was obtained for the procedure, with all risks and benefits of procedure explained the patient was taken to the endoscopy suite and placed in the left lateral decubitus position. Following sequential administration of sedation as per above, the endoscope was inserted into the mouth and advanced under direct vision to second portion of the duodenum. A careful inspection was made as the gastroscope was withdrawn, including a retroflexed view of the proximal stomach; findings and interventions are described below. Findings:     Esophagus:   - No varices seen in esophagus. ++ Distal esophagus with linear healing erosive esophagitis. Some oozing near EG junction with LA Grade B erosive esophagitis. NO bx performed due to bleeding risk. No varices noted. Stomach:   + Diffuse salt and pepper erythema with prepyloric shallow erosion c/w erosive gastritis. S/P Bx. No blood or black/old blood seen in stomach. - No gastric varices seen on retroflexion. Duodenum:   - The bulb and post bulbar mucosa is normal in appearance to the second portion. The duodenal folds appeared normal.     Therapies:  As above    Specimen: Specimens were collected as described and send to the laboratory. Complications:   None were encountered during the procedure. EBL: < 10 ml.           Recommendations:   -continue on high dose acid suppression with BID PP and continue at discharge  -will need repeat EGD in 6 months to document healing  -advance diet as tolerated    Alivia Clark MD  8/23/2018  1:03 PM

## 2018-08-24 VITALS
DIASTOLIC BLOOD PRESSURE: 65 MMHG | OXYGEN SATURATION: 100 % | HEIGHT: 70 IN | SYSTOLIC BLOOD PRESSURE: 126 MMHG | BODY MASS INDEX: 23.55 KG/M2 | TEMPERATURE: 98.7 F | RESPIRATION RATE: 18 BRPM | HEART RATE: 104 BPM | WEIGHT: 164.46 LBS

## 2018-08-24 LAB
ANION GAP SERPL CALC-SCNC: 10 MMOL/L (ref 5–15)
BACTERIA SPEC CULT: NORMAL
BUN SERPL-MCNC: 4 MG/DL (ref 6–20)
BUN/CREAT SERPL: 8 (ref 12–20)
CALCIUM SERPL-MCNC: 8.8 MG/DL (ref 8.5–10.1)
CHLORIDE SERPL-SCNC: 102 MMOL/L (ref 97–108)
CO2 SERPL-SCNC: 23 MMOL/L (ref 21–32)
CREAT SERPL-MCNC: 0.51 MG/DL (ref 0.7–1.3)
GLUCOSE SERPL-MCNC: 100 MG/DL (ref 65–100)
POTASSIUM SERPL-SCNC: 3.7 MMOL/L (ref 3.5–5.1)
SERVICE CMNT-IMP: NORMAL
SODIUM SERPL-SCNC: 135 MMOL/L (ref 136–145)

## 2018-08-24 PROCEDURE — 36415 COLL VENOUS BLD VENIPUNCTURE: CPT | Performed by: NURSE PRACTITIONER

## 2018-08-24 PROCEDURE — 74011250636 HC RX REV CODE- 250/636: Performed by: INTERNAL MEDICINE

## 2018-08-24 PROCEDURE — 74011250637 HC RX REV CODE- 250/637: Performed by: INTERNAL MEDICINE

## 2018-08-24 PROCEDURE — 97116 GAIT TRAINING THERAPY: CPT

## 2018-08-24 PROCEDURE — 97530 THERAPEUTIC ACTIVITIES: CPT

## 2018-08-24 PROCEDURE — 74011250637 HC RX REV CODE- 250/637: Performed by: NURSE PRACTITIONER

## 2018-08-24 PROCEDURE — 80048 BASIC METABOLIC PNL TOTAL CA: CPT | Performed by: NURSE PRACTITIONER

## 2018-08-24 PROCEDURE — 94760 N-INVAS EAR/PLS OXIMETRY 1: CPT

## 2018-08-24 PROCEDURE — 92526 ORAL FUNCTION THERAPY: CPT | Performed by: SPEECH-LANGUAGE PATHOLOGIST

## 2018-08-24 RX ORDER — PANTOPRAZOLE SODIUM 40 MG/1
40 TABLET, DELAYED RELEASE ORAL
Qty: 60 TAB | Refills: 0 | Status: SHIPPED | OUTPATIENT
Start: 2018-08-24

## 2018-08-24 RX ORDER — NICOTINE 7MG/24HR
1 PATCH, TRANSDERMAL 24 HOURS TRANSDERMAL
Qty: 30 PATCH | Refills: 0 | Status: SHIPPED | OUTPATIENT
Start: 2018-08-24 | End: 2018-09-23

## 2018-08-24 RX ORDER — ASPIRIN 325 MG/1
100 TABLET, FILM COATED ORAL DAILY
Qty: 30 TAB | Refills: 0 | Status: SHIPPED | OUTPATIENT
Start: 2018-08-25

## 2018-08-24 RX ADMIN — LORAZEPAM 2 MG: 2 INJECTION INTRAMUSCULAR; INTRAVENOUS at 04:37

## 2018-08-24 RX ADMIN — Medication 10 ML: at 03:50

## 2018-08-24 RX ADMIN — PANTOPRAZOLE SODIUM 40 MG: 40 TABLET, DELAYED RELEASE ORAL at 07:54

## 2018-08-24 RX ADMIN — Medication 100 MG: at 09:59

## 2018-08-24 NOTE — PROGRESS NOTES
Gastroenterology Daily Progress Note (Dr. Keegan Fortune)   [facility]    Admit Date: 8/19/2018       Subjective:       No further N/V. Tolerating [facility] soft diet. Current Facility-Administered Medications   Medication Dose Route Frequency    pantoprazole (PROTONIX) tablet 40 mg  40 mg Oral ACB&D    Thiamine Mononitrate (B-1) tablet 100 mg  100 mg Oral DAILY    hydrALAZINE (APRESOLINE) 20 mg/mL injection 10 mg  10 mg IntraVENous Q4H PRN    sodium chloride (NS) flush 5-10 mL  5-10 mL IntraVENous PRN    sodium chloride (NS) flush 5-10 mL  5-10 mL IntraVENous Q8H    sodium chloride (NS) flush 5-10 mL  5-10 mL IntraVENous PRN    acetaminophen (TYLENOL) tablet 650 mg  650 mg Oral Q4H PRN    ondansetron (ZOFRAN) injection 4 mg  4 mg IntraVENous Q4H PRN    LORazepam (ATIVAN) injection 2 mg  2 mg IntraVENous Q1H PRN    LORazepam (ATIVAN) injection 4 mg  4 mg IntraVENous Q1H PRN    prochlorperazine (COMPAZINE) with saline injection 10 mg  10 mg IntraVENous Q6H PRN    nicotine (NICODERM CQ) 7 mg/24 hr patch 1 Patch  1 Patch TransDERmal DAILY PRN        Objective:     Visit Vitals    /81 (BP 1 Location: Left arm, BP Patient Position: Sitting)    Pulse 90    Temp 97.9 °F (36.6 °C)    Resp 18    Ht 5' 10\" (1.778 m)    Wt 74.6 kg (164 lb 7.4 oz)    SpO2 98%    BMI 23.6 kg/m2   Blood pressure 137/81, pulse 90, temperature 97.9 °F (36.6 °C), resp. rate 18, height 5' 10\" (1.778 m), weight 74.6 kg (164 lb 7.4 oz), SpO2 98 %.    08/24 0701 - 08/24 1900  In: -   Out: 100 [Urine:100]    08/22 1901 - 08/24 0700  In: 940 [P.O.:240; I.V.:700]  Out: 2850 [Urine:2850]      Intake/Output Summary (Last 24 hours) at 08/24/18 1118  Last data filed at 08/24/18 1048   Gross per 24 hour   Intake              940 ml   Output             1125 ml   Net             -185 ml       Physical Exam:     General: awake and more alert WM in NAD  Chest:  CTA, No rhonchi, rales or rubs.   Heart: S1, S2, RRR  GI: Soft, NT, ND + bowel sounds  CNS: awake and alert      Labs:     Lab Results   Component Value Date/Time    WBC 8.8 08/23/2018 02:05 AM                HGB 13.1 08/23/2018 02:05 AM          HCT 36.8 08/23/2018 02:05 AM    PLATELET 599 41/39/1735 02:05 AM    MCV 96.8 08/23/2018 02:05 AM       Recent Results (from the past 24 hour(s))   METABOLIC PANEL, BASIC    Collection Time: 08/24/18  3:51 AM   Result Value Ref Range    Sodium 135 (L) 136 - 145 mmol/L    Potassium 3.7 3.5 - 5.1 mmol/L    Chloride 102 97 - 108 mmol/L    CO2 23 21 - 32 mmol/L    Anion gap 10 5 - 15 mmol/L    Glucose 100 65 - 100 mg/dL    BUN 4 (L) 6 - 20 MG/DL    Creatinine 0.51 (L) 0.70 - 1.30 MG/DL    BUN/Creatinine ratio 8 (L) 12 - 20      GFR est AA >60 >60 ml/min/1.73m2    GFR est non-AA >60 >60 ml/min/1.73m2    Calcium 8.8 8.5 - 10.1 MG/DL     Recent Labs      08/24/18   0351  08/23/18   0205  08/22/18   0524   NA  135*  133*  136   K  3.7  3.7  3.6   CL  102  101  103   CO2  23  24  23   BUN  4*  5*  6   CREA  0.51*  0.53*  0.57*   GLU  100  103*  81   CA  8.8  8.2*  8.1*   MG   --    --   1.7   PHOS   --    --   2.1*       Impression:    N/V prior to admission with hematemesis secondary to esophagitis (see EGD)  Alcohol withdrawal: improved  AMS: improved         Plan:  No further N/V and suspect related to EtOH withdrawal and esophagitis. He is tolerating PO and would send home on BID PPI 1 mo then daily for 2 months. Will need repeat EGD in 3-6 months. Will see again as needed.        Horacio Aguilar MD    8/24/2018  3500  [address] [place] [address], [address] [place] [address]. [phone]  [address] [place] [address] South: [phone]

## 2018-08-24 NOTE — PROGRESS NOTES
Problem: Dysphagia (Adult)  Goal: *Acute Goals and Plan of Care (Insert Text)  Speech Therapy Goals  Initiated 8/22/2018  1. Patient will tolerate full liquid diet without signs/symptoms of aspiration given minimal cues within 7 day(s). Speech language pathology dysphagia treatment  Patient: Shad Espinal Sr. (64 y.o. male)  Date: 8/24/2018  Diagnosis: Vomiting  SIRS (systemic inflammatory response syndrome) (HCC)  GIB (gastrointestinal bleeding)  vomited blood <principal problem not specified>  Procedure(s) (LRB):  ESOPHAGOGASTRODUODENOSCOPY (EGD) (N/A)  ESOPHAGOGASTRODUODENAL (EGD) BIOPSY (N/A) 1 Day Post-Op  Precautions: DNR, Fall, Bed Alarm    ASSESSMENT:  Patient had been upgraded by MD to [facility] soft since last seen. Had just eaten meal and appears to be tolerating, but declined solid trials so I am not able to upgrade him (though if MD wanted to do this it would be reasonable). No difficulty with liquids. Will follow up next week as needed. Do not expect patient nto need any long term SLP f/u if goes to SNF. Progression toward goals:  [x]         Improving appropriately and progressing toward goals  []         Improving slowly and progressing toward goals  []         Not making progress toward goals and plan of care will be adjusted     PLAN:  Recommendations and Planned Interventions:  1. Continue [facility] soft diet  2. SLp following  Patient continues to benefit from skilled intervention to address the above impairments. Continue treatment per established plan of care. Discharge Recommendations:  Likely none for SLP     SUBJECTIVE:   Patient stated I want to go home and see my cat.     OBJECTIVE:   Cognitive and Communication Status:  Neurologic State: Alert (some slightly off topic responses but not seriously confused)  Orientation Level: Oriented to situation, Oriented to person  Cognition: Decreased attention/concentration  Perception: Cues to maintain midline in standing, Tactile, Verbal, Visual  Perseveration: Perseverates during conversation  Safety/Judgement: Awareness of environment  Dysphagia Treatment:  Oral Assessment:  Oral Assessment  Labial: No impairment  Dentition: Natural  Lingual: Decreased rate  P.O. Trials:  Patient Position: up in chiar, just finished lunch  Vocal quality prior to P.O.: No impairment  Consistency Presented: Thin liquid (patient declined solids 2/2 having just eaten, despite encouragement)  How Presented: Self-fed/presented        Bolus Formation/Control:  (no impairment with liquids)  Type of Impairment: Delayed  Propulsion: No impairment  Oral Residue: None  Initiation of Swallow: No impairment  Laryngeal Elevation: Functional  Aspiration Signs/Symptoms: None  Pharyngeal Phase Characteristics: Double swallow; Suspected pharyngeal residue  Effective Modifications: None                                                               Pain:  Pain Scale 1: Numeric (0 - 10)  Pain Intensity 1: 0     After treatment:   [x]              Patient left in no apparent distress sitting up in chair  []              Patient left in no apparent distress in bed  [x]              Call bell left within reach  [x]              Nursing notified  []              Caregiver present  []              Bed alarm activated    COMMUNICATION/EDUCATION:   P  The patients plan of care including recommendations, planned interventions, and recommended diet changes were discussed with: Registered Nurse. []              Posted safety precautions in patient's room.     Archana Morgan SLP  Time Calculation: 15 mins

## 2018-08-24 NOTE — DISCHARGE SUMMARY
Hospitalist Discharge Summary     Patient ID:  Saeed Lemus  [id number]  70 y.o.  1955    PCP on record: Suleman Ceja DO    Admit date: 8/19/2018  Discharge date and time: 8/24/2018      DISCHARGE DIAGNOSIS:  Nausea/Vomiting   Hematemesis POA  Dehydration secondary to vomiting   Esophagitis  Alcohol withdrawal  Acute metabolic encephalopathy  Alcohol abuse  Non-infectious SIRS   Electrolyte imbalance (K, Mg, PO4)  Mild hyperglycemia on admission  History of Rheumatic Fever and Polio  Tobacco Use:  Body mass index is 23.6 kg/(m^2).         CONSULTATIONS:  IP CONSULT TO GASTROENTEROLOGY    Excerpted HPI from H&P of Ludwig Lee MD:  Jazmin [place] is a 70 y.o. male who presents with Vomiting for 3 days. Patient is unable to tolerate solids because he feel like his \"throat is closing up. \" He is able to keep down liquids. Patient reports that his emesis is brown and occasionally streaked with red. He thought it was brown because of the tar from the cigarettes that he smokes. He is unsure if he has abdominal pain. He reports CP and abdominal muscle strain from vomiting. He reports a \"messy\" BM 2 days ago that was brown. He reports subjective fevers. He denies EtOH use in the last 3 days.     In the ED patient is ill appearing and tachycardic with otherwise normal vital signs. His Leukocyte count was elevated to 16K. He is clinically dehydrated (urine is darker than tea). Acute abdominal series and RUQ ultrasound are essentially wnl.    ______________________________________________________________________  DISCHARGE SUMMARY/HOSPITAL COURSE:  for full details see H&P, daily progress notes, labs, consult notes. The patient was evaluated by both PT/OT who recommended for him to be discharged to SNF. Pt cried and refused to go to SNF. He states, \"I have been taking care of myself all my life. I don't want to go to any other place other than home.  I will not drink or smoke cigarettes and pot. \" Patient was ok with home health nurse/PT/OT visit. CM informed pt's brother in law about the discharge plan.  nurse/PT/OT and  have been arranged. High risk of readmission  Discuss my concerns with our risk management department. Unfortunately, pt is able to make a decision for himself. At this point, we can not force him to make the decision that he is not willing to follow through. Pt's friend confirmed that pt's brother will be visit him from [place] this weekend. He does have brother in law who lives near by. Asked pt whether he would consider having a private care duty person to help him at home. He responded as \"For what? So I can waste my money? \"  However, pt was very tearful and thanked me for trying to help me out. Nausea/Vomiting   Hematemesis POA  Dehydration secondary to vomiting   Esophagitis  - symptom resolved; pt is tolerating mechanical soft diet without difficulty. Advised to continue with low fat/choelersterol soft diet   - appreciate SPL input  - no further hematemesis  - H&H (13.1/36) stable  - appreciate GI input; EGD revealed esophagitis. Continue with PPI BID      Alcohol withdrawal  Acute metabolic encephalopathy  Alcohol abuse - has 6 pack daily. - much more alert today. Continue with thiamin, folate, multivitiamin  - discussed about importance smoking/THC and ETOH cessation. Pt agreed  - Tox screen (+) for THC. Discussed about importance of not consuming THC      Non-infectious SIRS - tachycardia and leukocytosis on admission (resolved)  - WBC normalized. No signs of active infection.  Leukocytosis likely reactive.    - lactic acid normal.  - blood cx no growth so far      Electrolyte imbalance (K, Mg, PO4)  - resolved      Mild hyperglycemia on admission  - A1c 4.4  - normal TSH, free T4  - AM cortisol 16.9  - glucose stayed 's      History of Rheumatic Fever and Polio      Tobacco Use:  - nicotine patch prn      Body mass index is 23.6 kg/(m^2).           _______________________________________________________________________  Patient seen and examined by me on discharge day. Pertinent Findings:  Gen:    Not in distress  Chest: Clear lungs  CVS:   Regular rhythm. No edema  Abd:  Soft, not distended, not tender  Neuro:  Alert, orient x 3  _______________________________________________________________________  DISCHARGE MEDICATIONS:   Current Discharge Medication List      START taking these medications    Details   nicotine (NICODERM CQ) 7 mg/24 hr 1 Patch by TransDERmal route daily as needed for Other (nicotine withdrawal) for up to 30 days. Qty: 30 Patch, Refills: 0      pantoprazole (PROTONIX) 40 mg tablet Take 1 Tab by mouth Before breakfast and dinner. Qty: 60 Tab, Refills: 0      Thiamine Mononitrate (B-1) 100 mg tablet Take 1 Tab by mouth daily. Qty: 30 Tab, Refills: 0         CONTINUE these medications which have NOT CHANGED    Details   multivitamin with iron tablet Take 1 Tab by mouth daily. My Recommended Diet, Activity, Wound Care, and follow-up labs are listed in the patient's Discharge Insturctions which I have personally completed and reviewed.     _______________________________________________________________________  DISPOSITION:    Home with Family:    Home with HH/PT/OT/RN: y   SNF/LTC:    TAMMIE:    OTHER:        Condition at Discharge:  Stable  _______________________________________________________________________  Follow up with:   PCP : Klae Reno DO  Follow-up Information     Follow up With Details Comments Contact Info    Idalmis Rodríguez DO Go on 8/28/2018 Please follow up on August 28, 2018 at 9:45 [address], PT and OT services  [facility] [address] [place] [address]    Jorge A Mcdaniel MD Schedule an appointment as soon as possible for a visit 2 weeks [address]  [place] [address]  [phone]                Total time in minutes spent coordinating this discharge (includes going over instructions, follow-up, prescriptions, and preparing report for sign off to her PCP) :  30 minutes    Signed:  Christiane Kaiser NP

## 2018-08-24 NOTE — ROUTINE PROCESS
Bedside and Verbal shift change report given to [facility] (oncoming nurse) by Dina Santos RN (offgoing nurse). Report given with SBAR, Kardex, MAR and Recent Results.

## 2018-08-24 NOTE — PROGRESS NOTES
Initial Nutrition Assessment:    INTERVENTIONS/RECOMMENDATIONS:   · Meals/Snacks: General/healthful diet: mechanical soft diet    ASSESSMENT:   Patient medically noted for alcohol withdrawal, vomiting PTA, and esophagitis. PMH for hyperlipidemia. Diet advanced to mechanical soft s/p EGD yesterday. Patient sitting in chair at time of visit this morning. Reports he has no issues with his appetite. Menu at bedside. No questions or concerns from patient at this time. Encouraged intake of meals. Diet Order: Mechanical soft  % Eaten:  Patient Vitals for the past 72 hrs:   % Diet Eaten   08/24/18 0302 0 %       Pertinent Medications: [x]Reviewed []Other: Protonix, thiamine   Pertinent Labs: [x]Reviewed []Other: Na 135  Food Allergies: [x]None []Other   Last BM: 8/23   [x]Active     []Hyperactive  []Hypoactive       [] Absent BS  Skin:    [x] Intact   [] Incision  [] Breakdown: [] Edema []Other:    Anthropometrics:   Height: 5' 10\" (177.8 cm) Weight: 74.6 kg (164 lb 7.4 oz)   IBW (%IBW):   ( ) UBW (%UBW):   (  %)   Last Weight Metrics:  Weight Loss Metrics 8/19/2018 11/23/2016 2/9/2016 2/21/2014 1/24/2014 9/10/2013 5/22/2013   Today's Wt 164 lb 7.4 oz 177 lb 2 oz 181 lb 186 lb 188 lb 176 lb 180 lb   BMI 23.6 kg/m2 24.7 kg/m2 25.26 kg/m2 25.95 kg/m2 26.23 kg/m2 24.56 kg/m2 25.12 kg/m2       BMI: Body mass index is 23.6 kg/(m^2). This BMI is indicative of:   []Underweight    [x]Normal    []Overweight    [] Obesity   [] Extreme Obesity (BMI>40)     Estimated Nutrition Needs (Based on):   2004 Kcals/day (BMR (1541) x 1. 3AF) , 75 g (1.0 g/kg bw) Protein  Carbohydrate:  At Least 130 g/day  Fluids:2000 mL/day (1ml/kcal)    Pt expected to meet estimated nutrient needs: [x]Yes []No    NUTRITION DIAGNOSES:   Problem:  No nutritional diagnosis at this time      Etiology: related to       Signs/Symptoms: as evidenced by        NUTRITION INTERVENTIONS:  Meals/Snacks: General/healthful diet                  GOAL:   PO intake >70% of meals next 3-5 days    LEARNING NEEDS (Diet, Food/Nutrient-Drug Interaction):    [x] None Identified   [] Identified and Education Provided/Documented   [] Identified and Pt declined/was not appropriate     Cultural, Presybeterian, OR Ethnic Dietary Needs:    [x] None Identified   [] Identified and Addressed     [x] Interdisciplinary Care Plan Reviewed/Documented    [x] Discharge Planning: Heart healthy diet      MONITORING /EVALUATION:   Behavioral-Environmental Outcomes: Behavior  Food/Nutrient Intake Outcomes:  Total energy intake  Physical Signs/Symptoms Outcomes: Weight/weight change, Electrolyte and renal profile    NUTRITION RISK:    [] High              [x] Moderate           []  Low  []  Minimal/Uncompromised    PT SEEN FOR:    []  MD Consult: []Calorie Count      []Diabetic Diet Education        []Diet Education     []Electrolyte Management     []General Nutrition Management and Supplements     []Management of Tube Feeding     []TPN Recommendations    [x]  RN Referral:  [x]MST score >=2     []Enteral/Parenteral Nutrition PTA     []Pregnant: Gestational DM or Multigestation     []Pressure Ulcer/Wound Care needs        []  Low BMI  []  JUNE    Bethany Notice  Pager [phone]                 Weekend Pager [phone]

## 2018-08-24 NOTE — PROGRESS NOTES
CM met with pt and discussed going for rehab at a SNF and pt was in agreement. Discussed SNF's and pt chose [facility]. [facility] since it is close to his home. Referral sent via connect [facility]. Pt stated he has a MPOA and it is his brother, Jaxson Garcia who is coming in from [place]. Pt couldn't remember his number. CM called pt's brother in law, Aneudy Ayala and he provided the phone numbers for his brother who is a MPOA. Contact numbers are- A#[phone] and H# [phone]. Pt stated he lives alone in a 1 story home with 2 steps and a ramp to the entrance. Pt has wheelchairs at home from his friend and wife who passed away on Nov. 20, 2017. Pt states he was independent with his ADL's and IADL's prior to admission. Pt's brother in law, Aneudy Ayala ([phone]) lives 2 doors away and assists pt as needed. Pt's brother in law agreed to bring clothes to pt for the SNF. CM provided updates to Ashli Briones NP. CM called Johnny Morales and informed her of the referral. They will review it and get back to this CM. Johnny Morales stated pt needs to be free of a tele sitter for 24 hrs. CM informed Lydia with [employer] to screen pt for Medicaid. 2:30pm - CM received update from Ashli Briones NP that pt refused to go to a SNF and pt will be discharged home with home health. CM set pt up home health with [facility]. Referral sent via [employer] and they accepted. FOC signed by pt. CM sent referral to [employer] via [employer] to assist pt with resources. CM called pt's brother in law and informed him of pt discharging home with home health. He will drive pt home by car. F/u appts made and documented on the discharge paperwork. CM provided pt with the 2nd  Medicare letter and pt signed it. CM provided pt with substance abuse and addiction resources. 3pm - Received call from pt's brother, Jaxson Garcia ([phone]) and he is flying from [place] to be with pt. He will arrive on Monday. CM reviewed the discharge plan with him.      Care Management Interventions  PCP Verified by CM: Yes Himanshu Curran,  )  Mode of Transport at Discharge:  Other (see comment) (pt's brother in law will transport by car)  Transition of Care Consult (CM Consult): Discharge Planning, [address]: No  Reason Outside [place]: Patient already serviced by other home care/hospice agency [facility])  Discharge Durable Medical Equipment: No  Physical Therapy Consult: Yes  Occupational Therapy Consult: Yes  Speech Therapy Consult: Yes  Current Support Network: Own Home, Lives Alone  Confirm Follow Up Transport: Family  Plan discussed with Pt/Family/Caregiver: Yes  Freedom of Choice Offered: Yes  Discharge Location  Discharge Placement: Home with home health      Wai Foster, [address]

## 2018-08-24 NOTE — ROUTINE PROCESS
Bedside and Verbal shift change report given to Miky Hess RN (oncoming nurse) by Hiral Meeks RN (offgoing nurse). Report given with SBAR, Kardex, MAR and Recent Results.

## 2018-08-24 NOTE — PROGRESS NOTES
Patient is alert and oriented and is up in chair. H has refused to return to bed. H e states that he wants to go home where he can get some real rest. Patient sitting quietly in chair. Chair alarm and video monitoring in place. No distress noted.

## 2018-08-24 NOTE — FACE TO FACE
Face to Face Order for [address] Name  Sarabjit Smiley Sr. Date of Birth 1955   Age  70 y.o. Medical Record Number  [id number]   Room Number  3120/01   Admit date:  8/19/2018   Date of Face to Face:  8/24/2018     Admission Diagnosis:  GIB (gastrointestinal bleeding)     Diagnoses Present on Admission:  **None**     Past Medical History:   Diagnosis Date    Arthritis     Hiccups 4/27/2011    thorazine and referred for upper GI: duodenitis    Hx of acute poliomyelitis 1954    no residual problems    Hx of rheumatic fever     hx of rheumatic fever x 2 in childhood    Other ill-defined conditions(799.89)     surgery left hand removed distal to PIP on ring finger      Visit Vitals    /65 (BP 1 Location: Left arm, BP Patient Position: Sitting)    Pulse (!) 104    Temp 98.7 °F (37.1 °C)    Resp 18    Ht 5' 10\" (1.778 m)    Wt 74.6 kg (164 lb 7.4 oz)    SpO2 100%    BMI 23.6 kg/m2           Allergies   Allergen Reactions    Codeine Other (comments)     Cant remember reaction             I certify that the patient needs home health care as prescribed below and I will not be following this patient in the Community.  I also certify that the patient is homebound as evidenced by    - Patient with poor safety awareness and is at risk for falls without assistance of another person and the use of an assistive device. Patient with poor ambulation endurance limiting their safe ability to ascend/descend the required number of steps to leave the home.   - Requires the assistance of 1 or more persons to leave the home    - and also as noted in various sections of this medical record.  Dr. Valencia Lemus DO  will be responsible for signing the Plan of Care and will follow/coordinate ongoing care.      Initial Orders for Care:  - skilled nursing care:  skilled observation/assessment, patient education and administration of medications  - physical therapy: strengthening, stretching/ROM, transfer training, gait/stair training and balance training  - occupational therapy:  ADL safety (ie. cooking, bathing, dressing), ROM and pt/caregiver education      - Report to Vladislav Quach, DO     I certify that I am taking care of the patient today (Please see hospital Discharge Records for details of clinical issues pertaining to this order).       ________________________________________________________________________  Salima Rider, MSN, FNP-C, APRN-BC  (electronically signed; no signature required )    Hospitalist, [facility]   [address] [facility], [facility] [address], [address]  Tel: [phone]

## 2018-08-24 NOTE — PROGRESS NOTES
Errosive esophagitis and gastritis   Errosive esophagitis and gastritis   Bedside and Verbal shift change report given to [address] (oncoming nurse) by Jesu Parikh (offgoing nurse). Report included the following information SBAR, Kardex, Recent Results, Med Rec Status and Cardiac Rhythm SR/murmur.     Zone Phone:   [phone]      Significant changes during shift:  Transfer from PCU        Patient [facility] Sr.  70 y.o.  8/19/2018  2:19 PM by Sadiq Buchanan MD. Joon Araizahy Sr. 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    Hx of rheumatic heart disease  06/07/2012    Osteoarthrosis-l/leg ankle and knee 11/17/2011     Past Medical History:   Diagnosis Date    Arthritis     Hiccups 4/27/2011    thorazine and referred for upper GI: duodenitis    Hx of acute poliomyelitis 1954    no residual problems    Hx of rheumatic fever     hx of rheumatic fever x 2 in childhood    Other ill-defined conditions(799.89)     surgery left hand removed distal to PIP on ring finger         Core Measures:    CVA: No No  CHF:No No  PNA:No No    Post Op Surgical (If Applicable):     Number times ambulated in hallway past shift:  0  Number of times OOB to chair past shift:   0  NG Tube: No  Incentive Spirometer: No  Drains: No   Volume  0  Dressing Present:  No  Flatus:  No    Activity Status:    OOB to Chair Not applicable  Ambulated this shift No   Bed Rest No    Supplemental O2: (If Applicable)    NC No  NRB No  Venti-mask No  On 0 Liters/min      LINES AND DRAINS:    Central Line? No   PICC LINE? No   Urinary Catheter? No   DVT prophylaxis:    DVT prophylaxis Med- No  DVT prophylaxis SCD or LUL- yes refused    Wounds: (If Applicable)    Wounds-healing abrasions left elbow and left hand    Location     Patient Safety:    Falls Score Total Score: 5  Safety Level_______  Bed Alarm On? Yes  Sitter?  No    Plan for upcoming shift: Safety, CIWA        Discharge Plan: Yes possible SNF    Active Consults:  IP CONSULT TO GASTROENTEROLOGY

## 2018-08-24 NOTE — PROGRESS NOTES
Problem: Falls - Risk of  Goal: *Absence of Falls  Document Destiny Fall Risk and appropriate interventions in the flowsheet.    Outcome: Progressing Towards Goal  Fall Risk Interventions:  Mobility Interventions: Assess mobility with egress test, Communicate number of staff needed for ambulation/transfer, Patient to call before getting OOB, PT Consult for mobility concerns    Mentation Interventions: Adequate sleep, hydration, pain control, Door open when patient unattended, More frequent rounding    Medication Interventions: Bed/chair exit alarm, Patient to call before getting OOB    Elimination Interventions: Bed/chair exit alarm, Call light in reach, Patient to call for help with toileting needs, Toileting schedule/hourly rounds    History of Falls Interventions: Door open when patient unattended

## 2018-08-24 NOTE — PROGRESS NOTES
0400 While I waspassing in the alcazar, heard patient raising his voice to PCT insisting on getting out of bed. Went into room to assist, asked what I could do to help. Patient stated \"you all ask too many questions. \"  Also stated \"I am looking for my phone and three weapons. I want to leave. \"  Bernice Gins that he wanted to get up as he hadn't stood up for a long time. I reminded him that it had taken two people to help him from stretcher to bed when he transferred in. Offered to help patient to the chair while PCT and I cleaned the bed. Patient agreed. Once bed and gown changed, patient asked to use the phone to call a friend to get him. Gave him room phone and explained how to use. Patient called a person he addressed as \"Pankaj\", asked about his wallet and weapons, and asked Diana Rosa to come pick him up at [facility].  Patient ended call and said he felt trapped here, and would leave at [facility] regardless. Advised patient that he could leave against medical advice if he desired but I recommended he talk to his physician first.  Patient refused to go back to bed, asked for a wheelchair so he could visit \"his friends down the alcazar. I told him he was in a hospital, people in other rooms were sleeping. Left patient in chair with chair alarm under him, phone and call bell in reach and asked him to call when he wanted to get up. Asked him not to get up until one of us was in room.

## 2018-08-24 NOTE — PROGRESS NOTES
Problem: Mobility Impaired (Adult and Pediatric)  Goal: *Acute Goals and Plan of Care (Insert Text)  Physical Therapy Goals  Initiated 8/21/2018  1. Patient will move from supine to sit and sit to supine  in bed with independence within 7 day(s). 2.  Patient will transfer from bed to chair and chair to bed with minimal assistance/contact guard assist using the least restrictive device within 7 day(s). 3.  Patient will perform sit to stand with independence within 7 day(s). 4.  Patient will ambulate with minimal assistance/contact guard assist for 150 feet with the least restrictive device within 7 day(s). 5.  Patient will ascend/descend 4 stairs with 1 handrail(s) with minimal assistance/contact guard assist within 7 day(s). physical Therapy TREATMENT  Patient: Sherry Barry Sr. (64 y.o. male)  Date: 8/24/2018  Diagnosis: Vomiting  SIRS (systemic inflammatory response syndrome) (HCC)  GIB (gastrointestinal bleeding)  vomited blood <principal problem not specified>  Procedure(s) (LRB):  ESOPHAGOGASTRODUODENOSCOPY (EGD) (N/A)  ESOPHAGOGASTRODUODENAL (EGD) BIOPSY (N/A) 1 Day Post-Op  Precautions: DNR, Fall, Bed Alarm  Chart, physical therapy assessment, plan of care and goals were reviewed. ASSESSMENT:  Patient remains highly unsafe, with complete lack of insight on his marked instability and generalized weakness that impacts functional mobility. Received at bedside chair, agreeable, although patient reluctant to utilize RW rather than his typical use of rollator; patient educated in length on risk of rollator use given current deficits which he perseverated on throughout remainder of session. He required mod-max A for majority of session, to include sit<>stand transfers where demonstrated poor sequencing and posterior LOB requiring manual A to correct.  He continues to struggle with advancing RLE, which appears much smaller than LLE (perhaps due to report of polio?), with shuffling ataxic gait pattern and poor RW management (especially with turns) that poses a significant fall risk. Patient deferring all education provided, blaming deficits on lack of his typical situation or equipment; he may benefit from trial of rollator ahead to assist in self-realization that concerns are present. Given patient lives at home alone, with several steps to enter the household, SNF rehab is warranted at this time. Progression toward goals:  []    Improving appropriately and progressing toward goals  [x]    Improving slowly and progressing toward goals  []    Not making progress toward goals and plan of care will be adjusted     PLAN:  Patient continues to benefit from skilled intervention to address the above impairments. Continue treatment per established plan of care. Discharge Recommendations:  Destin Galdame[facility]  Further Equipment Recommendations for Discharge:  defer     SUBJECTIVE:   Patient stated I need my rollator.     OBJECTIVE DATA SUMMARY:   Critical Behavior:  Neurologic State: Alert, Confused  Orientation Level: Disoriented to situation, Disoriented to time, Oriented to person, Oriented to place  Cognition: Decreased attention/concentration, Decreased command following, Poor safety awareness, Impulsive  Safety/Judgement: Lack of insight into deficits  Functional Mobility Training:     Transfers:  Sit to Stand: Moderate assistance;Maximum assistance  Stand to Sit: Moderate assistance;Maximum assistance                             Balance:  Sitting: Intact  Standing: Impaired  Standing - Static: Poor;Constant support  Standing - Dynamic : Poor  Ambulation/Gait Training:  Distance (ft): 35 Feet (ft)  Assistive Device: Walker, rolling;Gait belt  Ambulation - Level of Assistance: Minimal assistance; Moderate assistance        Gait Abnormalities: Decreased step clearance; Ataxic; Shuffling gait;Lurching        Base of Support: Center of gravity altered;Shift to left     Speed/Sheila: Slow;Shuffled  Step Length: Left shortened;Right shortened (R>L )                  Unable to improve shuffling gait pattern despite repeated VC's. Patient frequently pausing, stopping altogether especially upon turns as he appeared to struggle with sliding RW to assist. Notable poor position with RW often shifted to his L causing RLE to position out of RW frame. Pain:  Pain Scale 1: Numeric (0 - 10)  Pain Intensity 1: 0              Activity Tolerance:   Fatigue limiting     Please refer to the flowsheet for vital signs taken during this treatment.   After treatment:   [x]    Patient left in no apparent distress sitting up in chair  []    Patient left in no apparent distress in bed  [x]    Call bell left within reach  [x]    Nursing notified  [x]    Caregiver present  [x]    Bed alarm activated    COMMUNICATION/COLLABORATION:   The patients plan of care was discussed with: Occupational Therapist, Registered Nurse and     Debbi Bae PT, DPT   Board-Certified Geriatric Clinical Specialist   Certified Exercise Expert for Aging Adults      Time Calculation: 19 mins

## 2018-08-24 NOTE — PROGRESS NOTES
Problem: Self Care Deficits Care Plan (Adult)  Goal: *Acute Goals and Plan of Care (Insert Text)  Occupational Therapy Goals  Initiated 8/21/2018    1. Patient will perform simple grooming tasks in upright position with Min A x1 for at least 3 minutes within 7 days. 2.  Patient will complete bathing in chair with Mod Ax1 within 7 days. 3.  Patient will perform upper body dressing with supervision/setup within 7 days. 4.  Patient will perform toilet transfer with overall Min A x2 and least restrictive device PRN within 7 days. 5.  Patient will follow one step simple command with 100% accuracy during simple ADL within 7 days. 6.  Patient will participate in upper body therapeutic exercises/activities with supervision/setup for at least 5 minutes within 7 days. Occupational Therapy TREATMENT  Patient: Krishan Greenwood Sr. (64 y.o. male)  Date: 8/24/2018  Diagnosis: Vomiting  SIRS (systemic inflammatory response syndrome) (HCC)  GIB (gastrointestinal bleeding)  vomited blood GIB (gastrointestinal bleeding)  Procedure(s) (LRB):  ESOPHAGOGASTRODUODENOSCOPY (EGD) (N/A)  ESOPHAGOGASTRODUODENAL (EGD) BIOPSY (N/A) 1 Day Post-Op  Precautions: DNR, Fall, Bed Alarm  Chart, occupational therapy assessment, plan of care, and goals were reviewed. ASSESSMENT:  Patient continues to demonstrate poor insight into deficits, impaired safety awareness, poor RW use and high risk for falls. Pt received in chair, required mod to max A x 2 for sit to stand with verbal cues for anterior weight shifting. Pt with strong posterior lean requiring max A for anterior weight shift to midline. Pt required constant CGA to min A for RW use as pt tends to step outside frame. He demonstrated extremely unsafe descent to toilet, prematurely letting go of the walker and grabbing grab bar. Pt did not square off to toilet surface prior to sitting. Educated pt on safety concerns and reviewed proper technique.  Pt with poor receptiveness, perseverating on his rollator. He is needing A for all transfers and ADLs and would benefit from SNF rehab. Progression toward goals:  []       Improving appropriately and progressing toward goals  [x]       Improving slowly and progressing toward goals  []       Not making progress toward goals and plan of care will be adjusted     PLAN:  Patient continues to benefit from skilled intervention to address the above impairments. Continue treatment per established plan of care. Discharge Recommendations:  Skilled Nursing Facility  Further Equipment Recommendations for Discharge:  TBD     SUBJECTIVE:   Patient stated Nobie Michelle would be fine with my rollator. I will bring it up and show you.     OBJECTIVE DATA SUMMARY:   Cognitive/Behavioral Status:  Neurologic State: Alert (some slightly off topic responses but not seriously confused)  Orientation Level: Oriented to situation;Oriented to person  Cognition: Decreased attention/concentration  Perception: Cues to maintain midline in standing; Tactile;Verbal;Visual  Perseveration: Perseverates during conversation  Safety/Judgement: Awareness of environment    Functional Mobility and Transfers for ADLs:  Bed Mobility:       Transfers:  Sit to Stand: Moderate assistance;Maximum assistance  Functional Transfers  Toilet Transfer : Minimum assistance;Assist x2; Additional time; Adaptive equipment (very unsafe)       Balance:  Sitting: Intact  Standing: Impaired  Standing - Static: Poor;Constant support  Standing - Dynamic : Poor    ADL Intervention:    Pt with strong posterior lean requiring max A for anterior weight shift to midline with stand from chair. Verbal cues for hand placement on chair, not pulling up on RW. Pt required constant CGA to min A for RW use as pt tends to step outside frame. Pt with weak L LE from hx of polio. He demonstrated extremely unsafe descent to toilet, prematurely letting go of the walker and grabbing grab bar. Pt did not square off to toilet surface prior to sitting. Educated pt on safety concerns and reviewed proper technique                                  Cognitive Retraining  Safety/Judgement: Awareness of environment      Pain:  Pain Scale 1: Numeric (0 - 10)  Pain Intensity 1: 0              Activity Tolerance:   VSS  Please refer to the flowsheet for vital signs taken during this treatment.   After treatment:   [x] Patient left in no apparent distress sitting up in chair  [] Patient left in no apparent distress in bed  [x] Call bell left within reach  [x] Nursing notified  [x] Caregiver and tele sitter  present  [x] Bed alarm activated    COMMUNICATION/COLLABORATION:   The patients plan of care was discussed with: Physical Therapist and Registered Nurse    Mitra Cardenas OT  Time Calculation: 25 mins

## 2018-08-24 NOTE — DISCHARGE INSTRUCTIONS
Patient Discharge Instructions     Pt Name  Fawn Aponte. Date of Birth 1955   Age  70 y.o. Medical Record Number  [id number]   PCP Delgado Keith DO    Admit date:  8/19/2018 @    [place] [address]    Room Number  3120/01   Date of Discharge 8/24/2018     Admission Diagnoses:     GIB (gastrointestinal bleeding)          Allergies   Allergen Reactions    Codeine Other (comments)     Cant remember reaction        You were admitted to [address] for  GIB (gastrointestinal bleeding)    YOUR OTHER MEDICAL DIAGNOSES INCLUDE (BUT NOT LIMITED TO ):  Present on Admission:  **None**      DIET:  Low fat, Low cholesterol, soft diet    Recommended activity: Activity as tolerated  Follow up : Follow-up Information     Follow up With Details Comments Joshua 112, DO Schedule an appointment as soon as possible for a visit within one week [address] [place] [address] Sr 56!! Perez's Esophagus: Care Instructions  Your Care Instructions    The esophagus is the tube that connects the throat to the stomach. Food and liquids go through this tube. In Perez's esophagus, the cells that line the tube change. This is usually because of gastroesophageal reflux disease (GERD). GERD causes acid from your stomach to back up into the esophagus. When you have Perez's esophagus, you are slightly more likely to get cancer of the esophagus. So regular testing is important to watch for signs of this cancer. You can treat GERD to control your symptoms and feel better. Follow-up care is a key part of your treatment and safety. Be sure to make and go to all appointments, and call your doctor if you are having problems. It's also a good idea to know your test results and keep a list of the medicines you take. How can you care for yourself at home? · Take your medicines exactly as prescribed.  Call your doctor if you think you are having a problem with your medicine. · If you take over-the-counter medicine, such as antacids or acid reducers, follow all instructions on the label. If you use these medicines often, talk with your doctor. Be careful when you take over-the-counter antacid medicines. Many of these medicines have aspirin in them. Read the label to make sure that you are not taking more than the recommended dose. Too much aspirin can be harmful. · Do not smoke or chew tobacco. Smoking can make GERD worse. If you need help quitting, talk to your doctor about stop-smoking programs and medicines. These can increase your chances of quitting for good. · Chocolate, mint, and alcohol can make GERD worse. They relax the valve between the esophagus and the stomach. · Spicy foods, foods that have a lot of acid (like tomatoes and oranges), and coffee can make GERD symptoms worse in some people. If your symptoms are worse after you eat a certain food, you may want to stop eating that food to see if your symptoms get better. · Eat smaller meals, and more often. After eating, wait 2 to 3 hours before you lie down. · Raise the head of your bed 6 to 8 inches by putting blocks under the frame or a foam wedge under the head of the mattress. · Do not wear tight clothing around your midsection. · If you are overweight, lose weight. Even losing 5 to 10 pounds can help. When should you call for help? Call your doctor now or seek immediate medical care if:    · You have new or worse belly pain.     · You are vomiting.    Watch closely for changes in your health, and be sure to contact your doctor if:    · You have any pain or difficulty swallowing.     · You are losing weight.     · You have new or worse symptoms, such as heartburn.     · You do not get better as expected. Where can you learn more? Go to http://patricio-namrata.info/.   Enter L146 in the search box to learn more about \"Perez's Esophagus: Care Instructions. \"  Current as of: May 12, 2017  Content Version: 11.7  © 0675-9100 [employer]. Care instructions adapted under license by [employer] (which disclaims liability or warranty for this information). If you have questions about a medical condition or this instruction, always ask your healthcare professional. [place] [address] any warranty or liability for your use of this information. · It is important that you take the medication exactly as they are prescribed. · Keep your medication in the bottles provided by the pharmacist and keep a list of the medication names, dosages, and times to be taken in your wallet. · Do not take other medications without consulting your doctor. ADDITIONAL INFORMATION: If you experience any of the following symptoms or have any health problem not listed below, then please call your primary care physician or return to the emergency room if you cannot get hold of your doctor: Fever, chills, nausea, vomiting, diarrhea, change in mentation, falling, bleeding, shortness of breath. I understand that if any problems occur once I am discharged, I am supposed to call my Primary care physician for further care or seek help in the Emergency Department at the nearest Healthcare facility. I have had an opportunity to discuss my clinical issues with my doctor and nursing staff. I understand and acknowledge receipt of the above instructions.                                                                                                                                            Physician's or R.N.'s Signature                                                            Date/Time                                                                                                                                              Patient or Representative Signature Date/Time

## 2018-08-24 NOTE — PROGRESS NOTES
0720: Bedside shift change report given to Noris Silvestre RN (oncoming nurse) by Dario Leslie RN (offgoing nurse). Report included the following information SBAR, Intake/Output, MAR and Recent Results. 8070: assessment completed. Patient a&o to self and \"hospital\" but not to situation of brought him here or date. Patient reports that he was unable to sleep all night due to \"children running up and down the hallways\". Patient currently sitting up at bedside with chair alarm in place. WA at 8.  Will continue to monitor for changes and updates

## 2018-08-24 NOTE — PROGRESS NOTES
Patient is alert to person and situation but not time and place. He asked this nurse where was his guns that were with him in the room he transferred from. Patient did not state that he wanted to haem himself or anyone else but that he had a right to have his guns and he has a concealed weapon permit. Patient has asked to be up in the chair. He says that he is going home this am. He says we cannot hold him here against his will. Chair alarm and video monitoring in place. Patient is removing his cardiac monitoring system. He also has the hiccups. He says x 7 days.

## 2020-11-16 ENCOUNTER — HOSPITAL ENCOUNTER (EMERGENCY)
Age: 65
Discharge: HOME | End: 2020-11-16
Attending: EMERGENCY MEDICINE
Payer: MEDICARE

## 2020-11-16 ENCOUNTER — APPOINTMENT (OUTPATIENT)
Dept: CT IMAGING | Age: 65
End: 2020-11-16
Attending: EMERGENCY MEDICINE
Payer: MEDICARE

## 2020-11-16 ENCOUNTER — APPOINTMENT (OUTPATIENT)
Dept: GENERAL RADIOLOGY | Age: 65
End: 2020-11-16
Attending: EMERGENCY MEDICINE
Payer: MEDICARE

## 2020-11-16 VITALS
DIASTOLIC BLOOD PRESSURE: 69 MMHG | RESPIRATION RATE: 16 BRPM | HEART RATE: 80 BPM | OXYGEN SATURATION: 98 % | TEMPERATURE: 98.1 F | BODY MASS INDEX: 24.5 KG/M2 | HEIGHT: 71 IN | WEIGHT: 175 LBS | SYSTOLIC BLOOD PRESSURE: 152 MMHG

## 2020-11-16 DIAGNOSIS — T14.8XXA ABRASION: ICD-10-CM
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
## 2020-11-16 DIAGNOSIS — S09.90XA CLOSED HEAD INJURY, INITIAL ENCOUNTER: ICD-10-CM

## 2020-11-16 PROCEDURE — 74011250636 HC RX REV CODE- 250/636: Performed by: EMERGENCY MEDICINE

## 2020-11-16 PROCEDURE — 70450 CT HEAD/BRAIN W/O DYE: CPT

## 2020-11-16 PROCEDURE — 99284 EMERGENCY DEPT VISIT MOD MDM: CPT

## 2020-11-16 PROCEDURE — 90471 IMMUNIZATION ADMIN: CPT

## 2020-11-16 PROCEDURE — 73030 X-RAY EXAM OF SHOULDER: CPT

## 2020-11-16 PROCEDURE — 90715 TDAP VACCINE 7 YRS/> IM: CPT | Performed by: EMERGENCY MEDICINE

## 2020-11-16 RX ADMIN — TETANUS TOXOID, REDUCED DIPHTHERIA TOXOID AND ACELLULAR PERTUSSIS VACCINE, ADSORBED 0.5 ML: 5; 2.5; 8; 8; 2.5 SUSPENSION INTRAMUSCULAR at 18:46
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
## 2020-11-16 NOTE — DISCHARGE INSTRUCTIONS
You were seen in the ER after your fall. You had a CAT scan of your head and x-ray performed. Please follow-up with your primary care doctor. Your tetanus was updated.
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
## 2021-01-01 ENCOUNTER — APPOINTMENT (OUTPATIENT)
Dept: GENERAL RADIOLOGY | Age: 66
End: 2021-01-01
Attending: EMERGENCY MEDICINE
Payer: MEDICARE

## 2021-01-01 ENCOUNTER — APPOINTMENT (OUTPATIENT)
Dept: CT IMAGING | Age: 66
End: 2021-01-01
Attending: EMERGENCY MEDICINE
Payer: MEDICARE

## 2021-01-01 ENCOUNTER — HOSPITAL ENCOUNTER (EMERGENCY)
Age: 66
Discharge: HOME | End: 2021-01-01
Attending: EMERGENCY MEDICINE | Admitting: EMERGENCY MEDICINE
Payer: MEDICARE

## 2021-01-01 VITALS
DIASTOLIC BLOOD PRESSURE: 87 MMHG | TEMPERATURE: 98.6 F | SYSTOLIC BLOOD PRESSURE: 134 MMHG | RESPIRATION RATE: 16 BRPM | OXYGEN SATURATION: 98 % | HEART RATE: 91 BPM

## 2021-01-01 DIAGNOSIS — S90.121A CONTUSION OF FIFTH TOE OF RIGHT FOOT, INITIAL ENCOUNTER: Primary | ICD-10-CM

## 2021-01-01 PROCEDURE — 99284 EMERGENCY DEPT VISIT MOD MDM: CPT

## 2021-01-01 PROCEDURE — 74011250637 HC RX REV CODE- 250/637: Performed by: EMERGENCY MEDICINE

## 2021-01-01 PROCEDURE — 73630 X-RAY EXAM OF FOOT: CPT

## 2021-01-01 PROCEDURE — 72125 CT NECK SPINE W/O DYE: CPT

## 2021-01-01 PROCEDURE — 70450 CT HEAD/BRAIN W/O DYE: CPT

## 2021-01-01 RX ORDER — CALCIUM CARBONATE 200(500)MG
200 TABLET,CHEWABLE ORAL ONCE
Status: COMPLETED | OUTPATIENT
Start: 2021-01-01 | End: 2021-01-01

## 2021-01-01 RX ADMIN — CALCIUM CARBONATE (ANTACID) CHEW TAB 500 MG 200 MG: 500 CHEW TAB at 09:27

## 2021-01-01 NOTE — ED TRIAGE NOTES
He arrives by EMS after he fell out of his wheelchair earlier. He did not want to come to the ED. EMS said they were concerned because of the redness of his right ankle and leg. He has an abrasion on his right knee from the fall. He said he hit his head. He says he does not know if he passed out.

## 2021-01-01 NOTE — ED NOTES
The patient was signed out to me by Dr. Karla Guerrero pending x-ray of the right foot. This film is now back and fails to demonstrate any acute process. The other films done on this patient today are likewise negative. He is to be discharged home with conservative management and follow-up with his own physician as needed.

## 2021-01-01 NOTE — PROGRESS NOTES
CM consult received and appreciated. Call received from [phone] [place] of transport needed home. Patient is paraplegic. Call placed to [facility] requested ride next ETA 1045.

## 2021-01-01 NOTE — DISCHARGE INSTRUCTIONS
Use cool compresses to the areas that are bothering you 3-4 times daily as needed for discomfort and swelling. Tylenol or Motrin for discomfort. Follow-up with your own physician if continued difficulty.
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
## 2022-03-19 PROBLEM — R11.10 VOMITING: Status: ACTIVE | Noted: 2018-08-19

## 2022-03-19 PROBLEM — R65.10 SIRS (SYSTEMIC INFLAMMATORY RESPONSE SYNDROME) (HCC): Status: ACTIVE | Noted: 2018-08-19

## 2022-03-20 PROBLEM — K92.2 GIB (GASTROINTESTINAL BLEEDING): Status: ACTIVE | Noted: 2018-08-19

## 2023-03-22 ENCOUNTER — HOSPITAL ENCOUNTER (EMERGENCY)
Age: 68
Discharge: HOME | End: 2023-03-23
Attending: EMERGENCY MEDICINE
Payer: MEDICARE

## 2023-03-22 ENCOUNTER — APPOINTMENT (OUTPATIENT)
Dept: GENERAL RADIOLOGY | Age: 68
End: 2023-03-22
Attending: EMERGENCY MEDICINE
Payer: MEDICARE

## 2023-03-22 VITALS
DIASTOLIC BLOOD PRESSURE: 60 MMHG | HEART RATE: 81 BPM | TEMPERATURE: 97.7 F | BODY MASS INDEX: 25.2 KG/M2 | RESPIRATION RATE: 18 BRPM | WEIGHT: 180 LBS | SYSTOLIC BLOOD PRESSURE: 146 MMHG | HEIGHT: 71 IN | OXYGEN SATURATION: 93 %

## 2023-03-22 DIAGNOSIS — L08.9 RIGHT FOOT INFECTION: Primary | ICD-10-CM

## 2023-03-22 LAB
ALBUMIN SERPL-MCNC: 3.9 G/DL (ref 3.5–5)
ALBUMIN/GLOB SERPL: 0.9 (ref 1.1–2.2)
ALP SERPL-CCNC: 66 U/L (ref 45–117)
ALT SERPL-CCNC: 26 U/L (ref 12–78)
ANION GAP SERPL CALC-SCNC: 6 MMOL/L (ref 5–15)
AST SERPL-CCNC: 44 U/L (ref 15–37)
BASOPHILS # BLD: 0.1 K/UL (ref 0–0.1)
BASOPHILS NFR BLD: 1 % (ref 0–1)
BILIRUB SERPL-MCNC: 0.6 MG/DL (ref 0.2–1)
BUN SERPL-MCNC: 11 MG/DL (ref 6–20)
BUN/CREAT SERPL: 16 (ref 12–20)
CALCIUM SERPL-MCNC: 8.8 MG/DL (ref 8.5–10.1)
CHLORIDE SERPL-SCNC: 100 MMOL/L (ref 97–108)
CO2 SERPL-SCNC: 24 MMOL/L (ref 21–32)
COMMENT, HOLDF: NORMAL
CREAT SERPL-MCNC: 0.67 MG/DL (ref 0.7–1.3)
CRP SERPL HS-MCNC: 2.5 MG/L
DIFFERENTIAL METHOD BLD: NORMAL
EOSINOPHIL # BLD: 0.4 K/UL (ref 0–0.4)
EOSINOPHIL NFR BLD: 4 % (ref 0–7)
ERYTHROCYTE [DISTWIDTH] IN BLOOD BY AUTOMATED COUNT: 12.1 % (ref 11.5–14.5)
ERYTHROCYTE [SEDIMENTATION RATE] IN BLOOD: 8 MM/HR (ref 0–20)
GLOBULIN SER CALC-MCNC: 4.3 G/DL (ref 2–4)
GLUCOSE SERPL-MCNC: 82 MG/DL (ref 65–100)
HCT VFR BLD AUTO: 43.8 % (ref 36.6–50.3)
HGB BLD-MCNC: 15.3 G/DL (ref 12.1–17)
IMM GRANULOCYTES # BLD AUTO: 0 K/UL (ref 0–0.04)
IMM GRANULOCYTES NFR BLD AUTO: 0 % (ref 0–0.5)
LACTATE BLD-SCNC: 1.61 MMOL/L (ref 0.4–2)
LYMPHOCYTES # BLD: 2.2 K/UL (ref 0.8–3.5)
LYMPHOCYTES NFR BLD: 21 % (ref 12–49)
MCH RBC QN AUTO: 33.2 PG (ref 26–34)
MCHC RBC AUTO-ENTMCNC: 34.9 G/DL (ref 30–36.5)
MCV RBC AUTO: 95 FL (ref 80–99)
MONOCYTES # BLD: 0.6 K/UL (ref 0–1)
MONOCYTES NFR BLD: 6 % (ref 5–13)
NEUTS SEG # BLD: 7.1 K/UL (ref 1.8–8)
NEUTS SEG NFR BLD: 68 % (ref 32–75)
NRBC # BLD: 0 K/UL (ref 0–0.01)
NRBC BLD-RTO: 0 PER 100 WBC
PLATELET # BLD AUTO: 361 K/UL (ref 150–400)
PMV BLD AUTO: 9.3 FL (ref 8.9–12.9)
POTASSIUM SERPL-SCNC: 5.4 MMOL/L (ref 3.5–5.1)
PROT SERPL-MCNC: 8.2 G/DL (ref 6.4–8.2)
RBC # BLD AUTO: 4.61 M/UL (ref 4.1–5.7)
SAMPLES BEING HELD,HOLD: NORMAL
SODIUM SERPL-SCNC: 130 MMOL/L (ref 136–145)
WBC # BLD AUTO: 10.5 K/UL (ref 4.1–11.1)

## 2023-03-22 PROCEDURE — 73630 X-RAY EXAM OF FOOT: CPT

## 2023-03-22 PROCEDURE — 80053 COMPREHEN METABOLIC PANEL: CPT

## 2023-03-22 PROCEDURE — 83605 ASSAY OF LACTIC ACID: CPT

## 2023-03-22 PROCEDURE — 99284 EMERGENCY DEPT VISIT MOD MDM: CPT

## 2023-03-22 PROCEDURE — 85025 COMPLETE CBC W/AUTO DIFF WBC: CPT

## 2023-03-22 PROCEDURE — 85652 RBC SED RATE AUTOMATED: CPT

## 2023-03-22 PROCEDURE — 87040 BLOOD CULTURE FOR BACTERIA: CPT

## 2023-03-22 PROCEDURE — 73610 X-RAY EXAM OF ANKLE: CPT

## 2023-03-22 PROCEDURE — 36415 COLL VENOUS BLD VENIPUNCTURE: CPT

## 2023-03-22 PROCEDURE — 86141 C-REACTIVE PROTEIN HS: CPT

## 2023-03-22 RX ORDER — DOXYCYCLINE HYCLATE 100 MG
100 TABLET ORAL 2 TIMES DAILY
Qty: 14 TABLET | Refills: 0 | Status: SHIPPED | OUTPATIENT
Start: 2023-03-22 | End: 2023-03-29

## 2023-03-22 RX ORDER — CEPHALEXIN 500 MG/1
500 CAPSULE ORAL 4 TIMES DAILY
Qty: 28 CAPSULE | Refills: 0 | Status: SHIPPED | OUTPATIENT
Start: 2023-03-22 | End: 2023-03-29

## 2023-03-22 NOTE — ED NOTES
1913: Assumed care of pt at this time. Pt on monitor x3 with call bell in reach. Plan of care devised for the evening.  All questions answered at this time
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
## 2023-03-22 NOTE — ED NOTES
Bedside shift change report given to Ras Gonzalez RN (oncoming nurse) by Cody Vera RN (offgoing nurse). Report included the following information SBAR, Kardex, ED Summary, Procedure Summary, MAR, and Recent Results.

## 2023-03-23 NOTE — DISCHARGE INSTRUCTIONS
You were evaluated in the emergency department for fall with concern for right foot infection. Your examination was reassuring as was your work-up including blood work and x-ray. It will be important for you to follow-up with your primary care physician in 2-3 days. Please take the antibiotics as prescribed. You can also make an appointment with podiatry, Dr. David Tapia, please call the number listed below. If you develop worsening symptoms such as fevers or worsening pain, swelling, or redness, please return to the emergency department immediately.

## 2023-03-26 LAB
BACTERIA SPEC CULT: NORMAL
SERVICE CMNT-IMP: NORMAL

## 2023-03-28 LAB
BACTERIA SPEC CULT: NORMAL
SERVICE CMNT-IMP: NORMAL

## 2025-01-24 ENCOUNTER — APPOINTMENT (OUTPATIENT)
Facility: HOSPITAL | Age: 70
DRG: 871 | End: 2025-01-24
Payer: MEDICARE

## 2025-01-24 ENCOUNTER — HOSPITAL ENCOUNTER (INPATIENT)
Facility: HOSPITAL | Age: 70
LOS: 6 days | Discharge: NURSING, REHAB OR LONG-TERM CARE | DRG: 871 | End: 2025-01-30
Admitting: INTERNAL MEDICINE
Payer: MEDICARE

## 2025-01-24 DIAGNOSIS — J96.01 ACUTE HYPOXIC RESPIRATORY FAILURE: Primary | ICD-10-CM

## 2025-01-24 LAB
ALBUMIN SERPL-MCNC: 2.9 G/DL (ref 3.5–5)
ALBUMIN/GLOB SERPL: 0.6 (ref 1.1–2.2)
ALP SERPL-CCNC: 117 U/L (ref 45–117)
ALT SERPL-CCNC: 40 U/L (ref 12–78)
ANION GAP SERPL CALC-SCNC: 10 MMOL/L (ref 2–12)
AST SERPL-CCNC: 43 U/L (ref 15–37)
BASOPHILS # BLD: 0 K/UL (ref 0–0.1)
BASOPHILS NFR BLD: 0 % (ref 0–1)
BILIRUB SERPL-MCNC: 1 MG/DL (ref 0.2–1)
BUN SERPL-MCNC: 25 MG/DL (ref 6–20)
BUN/CREAT SERPL: 30 (ref 12–20)
CALCIUM SERPL-MCNC: 9.2 MG/DL (ref 8.5–10.1)
CHLORIDE SERPL-SCNC: 101 MMOL/L (ref 97–108)
CO2 SERPL-SCNC: 24 MMOL/L (ref 21–32)
CREAT SERPL-MCNC: 0.84 MG/DL (ref 0.7–1.3)
DIFFERENTIAL METHOD BLD: ABNORMAL
EOSINOPHIL # BLD: 0 K/UL (ref 0–0.4)
EOSINOPHIL NFR BLD: 0 % (ref 0–7)
ERYTHROCYTE [DISTWIDTH] IN BLOOD BY AUTOMATED COUNT: 13.6 % (ref 11.5–14.5)
FLUAV RNA SPEC QL NAA+PROBE: NOT DETECTED
FLUBV RNA SPEC QL NAA+PROBE: NOT DETECTED
GLOBULIN SER CALC-MCNC: 4.5 G/DL (ref 2–4)
GLUCOSE SERPL-MCNC: 122 MG/DL (ref 65–100)
HCT VFR BLD AUTO: 40.3 % (ref 36.6–50.3)
HGB BLD-MCNC: 13.9 G/DL (ref 12.1–17)
IMM GRANULOCYTES # BLD AUTO: 0 K/UL (ref 0–0.04)
IMM GRANULOCYTES NFR BLD AUTO: 0 % (ref 0–0.5)
LACTATE BLD-SCNC: 0.91 MMOL/L (ref 0.4–2)
LACTATE BLD-SCNC: 2.12 MMOL/L (ref 0.4–2)
LYMPHOCYTES # BLD: 1.12 K/UL (ref 0.8–3.5)
LYMPHOCYTES NFR BLD: 9 % (ref 12–49)
MCH RBC QN AUTO: 31.7 PG (ref 26–34)
MCHC RBC AUTO-ENTMCNC: 34.5 G/DL (ref 30–36.5)
MCV RBC AUTO: 92 FL (ref 80–99)
MONOCYTES # BLD: 0.37 K/UL (ref 0–1)
MONOCYTES NFR BLD: 3 % (ref 5–13)
NEUTS BAND NFR BLD MANUAL: 7 %
NEUTS SEG # BLD: 10.91 K/UL (ref 1.8–8)
NEUTS SEG NFR BLD: 81 % (ref 32–75)
NRBC # BLD: 0 K/UL (ref 0–0.01)
NRBC BLD-RTO: 0 PER 100 WBC
PLATELET # BLD AUTO: 337 K/UL (ref 150–400)
PMV BLD AUTO: 11 FL (ref 8.9–12.9)
POTASSIUM SERPL-SCNC: 4.2 MMOL/L (ref 3.5–5.1)
PROCALCITONIN SERPL-MCNC: 0.24 NG/ML
PROT SERPL-MCNC: 7.4 G/DL (ref 6.4–8.2)
RBC # BLD AUTO: 4.38 M/UL (ref 4.1–5.7)
RBC MORPH BLD: ABNORMAL
SARS-COV-2 RNA RESP QL NAA+PROBE: NOT DETECTED
SODIUM SERPL-SCNC: 135 MMOL/L (ref 136–145)
SOURCE: NORMAL
TROPONIN I SERPL HS-MCNC: 12 NG/L (ref 0–76)
WBC # BLD AUTO: 12.4 K/UL (ref 4.1–11.1)

## 2025-01-24 PROCEDURE — 87040 BLOOD CULTURE FOR BACTERIA: CPT

## 2025-01-24 PROCEDURE — 84484 ASSAY OF TROPONIN QUANT: CPT

## 2025-01-24 PROCEDURE — 96375 TX/PRO/DX INJ NEW DRUG ADDON: CPT

## 2025-01-24 PROCEDURE — 36415 COLL VENOUS BLD VENIPUNCTURE: CPT

## 2025-01-24 PROCEDURE — 83605 ASSAY OF LACTIC ACID: CPT

## 2025-01-24 PROCEDURE — 85025 COMPLETE CBC W/AUTO DIFF WBC: CPT

## 2025-01-24 PROCEDURE — 87636 SARSCOV2 & INF A&B AMP PRB: CPT

## 2025-01-24 PROCEDURE — 6360000004 HC RX CONTRAST MEDICATION

## 2025-01-24 PROCEDURE — 2580000003 HC RX 258

## 2025-01-24 PROCEDURE — 80053 COMPREHEN METABOLIC PANEL: CPT

## 2025-01-24 PROCEDURE — 84145 PROCALCITONIN (PCT): CPT

## 2025-01-24 PROCEDURE — 2700000000 HC OXYGEN THERAPY PER DAY

## 2025-01-24 PROCEDURE — 5A0935A ASSISTANCE WITH RESPIRATORY VENTILATION, LESS THAN 24 CONSECUTIVE HOURS, HIGH NASAL FLOW/VELOCITY: ICD-10-PCS | Performed by: INTERNAL MEDICINE

## 2025-01-24 PROCEDURE — 96365 THER/PROPH/DIAG IV INF INIT: CPT

## 2025-01-24 PROCEDURE — 6360000002 HC RX W HCPCS: Performed by: INTERNAL MEDICINE

## 2025-01-24 PROCEDURE — 81001 URINALYSIS AUTO W/SCOPE: CPT

## 2025-01-24 PROCEDURE — 2060000000 HC ICU INTERMEDIATE R&B

## 2025-01-24 PROCEDURE — 71045 X-RAY EXAM CHEST 1 VIEW: CPT

## 2025-01-24 PROCEDURE — 99285 EMERGENCY DEPT VISIT HI MDM: CPT

## 2025-01-24 PROCEDURE — 71275 CT ANGIOGRAPHY CHEST: CPT

## 2025-01-24 PROCEDURE — 6360000002 HC RX W HCPCS

## 2025-01-24 RX ORDER — ASPIRIN 81 MG/1
81 TABLET, CHEWABLE ORAL DAILY
Status: DISCONTINUED | OUTPATIENT
Start: 2025-01-25 | End: 2025-01-25

## 2025-01-24 RX ORDER — 0.9 % SODIUM CHLORIDE 0.9 %
1000 INTRAVENOUS SOLUTION INTRAVENOUS ONCE
Status: COMPLETED | OUTPATIENT
Start: 2025-01-24 | End: 2025-01-24

## 2025-01-24 RX ORDER — ACETAMINOPHEN 325 MG/1
650 TABLET ORAL EVERY 4 HOURS PRN
Status: DISCONTINUED | OUTPATIENT
Start: 2025-01-24 | End: 2025-01-30 | Stop reason: HOSPADM

## 2025-01-24 RX ORDER — CARBIDOPA AND LEVODOPA 25; 100 MG/1; MG/1
1 TABLET ORAL 3 TIMES DAILY
Status: DISCONTINUED | OUTPATIENT
Start: 2025-01-24 | End: 2025-01-25

## 2025-01-24 RX ORDER — ENOXAPARIN SODIUM 100 MG/ML
40 INJECTION SUBCUTANEOUS DAILY
Status: DISCONTINUED | OUTPATIENT
Start: 2025-01-25 | End: 2025-01-30 | Stop reason: HOSPADM

## 2025-01-24 RX ORDER — POLYETHYLENE GLYCOL 3350 17 G/17G
17 POWDER, FOR SOLUTION ORAL DAILY PRN
Status: DISCONTINUED | OUTPATIENT
Start: 2025-01-24 | End: 2025-01-30 | Stop reason: HOSPADM

## 2025-01-24 RX ORDER — SODIUM CHLORIDE 0.9 % (FLUSH) 0.9 %
5-40 SYRINGE (ML) INJECTION PRN
Status: DISCONTINUED | OUTPATIENT
Start: 2025-01-24 | End: 2025-01-30 | Stop reason: HOSPADM

## 2025-01-24 RX ORDER — SODIUM CHLORIDE 9 MG/ML
INJECTION, SOLUTION INTRAVENOUS PRN
Status: DISCONTINUED | OUTPATIENT
Start: 2025-01-24 | End: 2025-01-30 | Stop reason: HOSPADM

## 2025-01-24 RX ORDER — ONDANSETRON 2 MG/ML
4 INJECTION INTRAMUSCULAR; INTRAVENOUS EVERY 4 HOURS PRN
Status: DISCONTINUED | OUTPATIENT
Start: 2025-01-24 | End: 2025-01-30 | Stop reason: HOSPADM

## 2025-01-24 RX ORDER — IOPAMIDOL 755 MG/ML
100 INJECTION, SOLUTION INTRAVASCULAR
Status: COMPLETED | OUTPATIENT
Start: 2025-01-24 | End: 2025-01-24

## 2025-01-24 RX ORDER — IPRATROPIUM BROMIDE AND ALBUTEROL SULFATE 2.5; .5 MG/3ML; MG/3ML
1 SOLUTION RESPIRATORY (INHALATION) EVERY 4 HOURS PRN
Status: DISCONTINUED | OUTPATIENT
Start: 2025-01-24 | End: 2025-01-30 | Stop reason: HOSPADM

## 2025-01-24 RX ORDER — ONDANSETRON 2 MG/ML
4 INJECTION INTRAMUSCULAR; INTRAVENOUS ONCE
Status: COMPLETED | OUTPATIENT
Start: 2025-01-24 | End: 2025-01-24

## 2025-01-24 RX ORDER — ATORVASTATIN CALCIUM 40 MG/1
40 TABLET, FILM COATED ORAL NIGHTLY
Status: DISCONTINUED | OUTPATIENT
Start: 2025-01-25 | End: 2025-01-25

## 2025-01-24 RX ORDER — HYDRALAZINE HYDROCHLORIDE 20 MG/ML
10 INJECTION INTRAMUSCULAR; INTRAVENOUS EVERY 6 HOURS PRN
Status: DISCONTINUED | OUTPATIENT
Start: 2025-01-24 | End: 2025-01-30 | Stop reason: HOSPADM

## 2025-01-24 RX ORDER — SODIUM CHLORIDE 0.9 % (FLUSH) 0.9 %
5-40 SYRINGE (ML) INJECTION EVERY 12 HOURS SCHEDULED
Status: DISCONTINUED | OUTPATIENT
Start: 2025-01-24 | End: 2025-01-30 | Stop reason: HOSPADM

## 2025-01-24 RX ORDER — VANCOMYCIN 2 G/400ML
25 INJECTION, SOLUTION INTRAVENOUS ONCE
Status: COMPLETED | OUTPATIENT
Start: 2025-01-24 | End: 2025-01-25

## 2025-01-24 RX ADMIN — PIPERACILLIN AND TAZOBACTAM 3375 MG: 3; .375 INJECTION, POWDER, LYOPHILIZED, FOR SOLUTION INTRAVENOUS at 17:48

## 2025-01-24 RX ADMIN — VANCOMYCIN 2000 MG: 2 INJECTION, SOLUTION INTRAVENOUS at 23:16

## 2025-01-24 RX ADMIN — ONDANSETRON 4 MG: 2 INJECTION INTRAMUSCULAR; INTRAVENOUS at 17:33

## 2025-01-24 RX ADMIN — SODIUM CHLORIDE 1000 ML: 9 INJECTION, SOLUTION INTRAVENOUS at 17:33

## 2025-01-24 RX ADMIN — IOPAMIDOL 100 ML: 755 INJECTION, SOLUTION INTRAVENOUS at 19:43

## 2025-01-24 ASSESSMENT — PAIN SCALES - GENERAL: PAINLEVEL_OUTOF10: 0

## 2025-01-25 LAB
ALBUMIN SERPL-MCNC: 2.6 G/DL (ref 3.5–5)
ALBUMIN/GLOB SERPL: 0.6 (ref 1.1–2.2)
ALP SERPL-CCNC: 100 U/L (ref 45–117)
ALT SERPL-CCNC: 48 U/L (ref 12–78)
ANION GAP SERPL CALC-SCNC: 7 MMOL/L (ref 2–12)
APPEARANCE UR: CLEAR
AST SERPL-CCNC: 33 U/L (ref 15–37)
BACTERIA URNS QL MICRO: NEGATIVE /HPF
BASOPHILS # BLD: 0.04 K/UL (ref 0–0.1)
BASOPHILS NFR BLD: 0.3 % (ref 0–1)
BILIRUB SERPL-MCNC: 0.8 MG/DL (ref 0.2–1)
BILIRUB UR QL: NEGATIVE
BUN SERPL-MCNC: 18 MG/DL (ref 6–20)
BUN/CREAT SERPL: 25 (ref 12–20)
CALCIUM SERPL-MCNC: 8.9 MG/DL (ref 8.5–10.1)
CHLORIDE SERPL-SCNC: 108 MMOL/L (ref 97–108)
CO2 SERPL-SCNC: 22 MMOL/L (ref 21–32)
COLOR UR: ABNORMAL
CREAT SERPL-MCNC: 0.71 MG/DL (ref 0.7–1.3)
DIFFERENTIAL METHOD BLD: ABNORMAL
EOSINOPHIL # BLD: 0 K/UL (ref 0–0.4)
EOSINOPHIL NFR BLD: 0 % (ref 0–7)
EPITH CASTS URNS QL MICRO: ABNORMAL /LPF
ERYTHROCYTE [DISTWIDTH] IN BLOOD BY AUTOMATED COUNT: 13.9 % (ref 11.5–14.5)
GLOBULIN SER CALC-MCNC: 4.5 G/DL (ref 2–4)
GLUCOSE SERPL-MCNC: 92 MG/DL (ref 65–100)
GLUCOSE UR STRIP.AUTO-MCNC: NEGATIVE MG/DL
HCT VFR BLD AUTO: 39.2 % (ref 36.6–50.3)
HGB BLD-MCNC: 13.1 G/DL (ref 12.1–17)
HGB UR QL STRIP: NEGATIVE
IMM GRANULOCYTES # BLD AUTO: 0.08 K/UL (ref 0–0.04)
IMM GRANULOCYTES NFR BLD AUTO: 0.5 % (ref 0–0.5)
KETONES UR QL STRIP.AUTO: ABNORMAL MG/DL
LEUKOCYTE ESTERASE UR QL STRIP.AUTO: NEGATIVE
LYMPHOCYTES # BLD: 2.83 K/UL (ref 0.8–3.5)
LYMPHOCYTES NFR BLD: 18.8 % (ref 12–49)
MCH RBC QN AUTO: 31.3 PG (ref 26–34)
MCHC RBC AUTO-ENTMCNC: 33.4 G/DL (ref 30–36.5)
MCV RBC AUTO: 93.8 FL (ref 80–99)
MONOCYTES # BLD: 1.11 K/UL (ref 0–1)
MONOCYTES NFR BLD: 7.4 % (ref 5–13)
NEUTS SEG # BLD: 11.03 K/UL (ref 1.8–8)
NEUTS SEG NFR BLD: 73 % (ref 32–75)
NITRITE UR QL STRIP.AUTO: NEGATIVE
NRBC # BLD: 0 K/UL (ref 0–0.01)
NRBC BLD-RTO: 0 PER 100 WBC
PH UR STRIP: 5.5 (ref 5–8)
PLATELET # BLD AUTO: 277 K/UL (ref 150–400)
PMV BLD AUTO: 10.3 FL (ref 8.9–12.9)
POTASSIUM SERPL-SCNC: 3.6 MMOL/L (ref 3.5–5.1)
PROT SERPL-MCNC: 7.1 G/DL (ref 6.4–8.2)
PROT UR STRIP-MCNC: 30 MG/DL
RBC # BLD AUTO: 4.18 M/UL (ref 4.1–5.7)
RBC #/AREA URNS HPF: ABNORMAL /HPF (ref 0–5)
SODIUM SERPL-SCNC: 137 MMOL/L (ref 136–145)
SP GR UR REFRACTOMETRY: 1.01 (ref 1–1.03)
URINE CULTURE IF INDICATED: ABNORMAL
UROBILINOGEN UR QL STRIP.AUTO: 2 EU/DL (ref 0.2–1)
WBC # BLD AUTO: 15.1 K/UL (ref 4.1–11.1)
WBC URNS QL MICRO: ABNORMAL /HPF (ref 0–4)

## 2025-01-25 PROCEDURE — 2500000003 HC RX 250 WO HCPCS: Performed by: INTERNAL MEDICINE

## 2025-01-25 PROCEDURE — 2580000003 HC RX 258: Performed by: INTERNAL MEDICINE

## 2025-01-25 PROCEDURE — 6360000002 HC RX W HCPCS: Performed by: INTERNAL MEDICINE

## 2025-01-25 PROCEDURE — 92610 EVALUATE SWALLOWING FUNCTION: CPT

## 2025-01-25 PROCEDURE — 2060000000 HC ICU INTERMEDIATE R&B

## 2025-01-25 PROCEDURE — 2700000000 HC OXYGEN THERAPY PER DAY

## 2025-01-25 PROCEDURE — 6370000000 HC RX 637 (ALT 250 FOR IP): Performed by: INTERNAL MEDICINE

## 2025-01-25 PROCEDURE — 36415 COLL VENOUS BLD VENIPUNCTURE: CPT

## 2025-01-25 PROCEDURE — 80053 COMPREHEN METABOLIC PANEL: CPT

## 2025-01-25 PROCEDURE — 85025 COMPLETE CBC W/AUTO DIFF WBC: CPT

## 2025-01-25 RX ORDER — ASPIRIN 81 MG/1
81 TABLET, CHEWABLE ORAL DAILY
Status: DISCONTINUED | OUTPATIENT
Start: 2025-01-26 | End: 2025-01-30 | Stop reason: HOSPADM

## 2025-01-25 RX ORDER — ATORVASTATIN CALCIUM 40 MG/1
40 TABLET, FILM COATED ORAL NIGHTLY
Status: DISCONTINUED | OUTPATIENT
Start: 2025-01-25 | End: 2025-01-30 | Stop reason: HOSPADM

## 2025-01-25 RX ORDER — DEXTROSE MONOHYDRATE AND SODIUM CHLORIDE 5; .9 G/100ML; G/100ML
INJECTION, SOLUTION INTRAVENOUS CONTINUOUS
Status: DISCONTINUED | OUTPATIENT
Start: 2025-01-25 | End: 2025-01-28

## 2025-01-25 RX ORDER — CARBIDOPA AND LEVODOPA 25; 100 MG/1; MG/1
1 TABLET ORAL 3 TIMES DAILY
Status: DISCONTINUED | OUTPATIENT
Start: 2025-01-25 | End: 2025-01-30 | Stop reason: HOSPADM

## 2025-01-25 RX ADMIN — CARBIDOPA AND LEVODOPA 1 TABLET: 25; 100 TABLET ORAL at 08:24

## 2025-01-25 RX ADMIN — ENOXAPARIN SODIUM 40 MG: 100 INJECTION SUBCUTANEOUS at 08:24

## 2025-01-25 RX ADMIN — ASPIRIN 81 MG: 81 TABLET, CHEWABLE ORAL at 08:24

## 2025-01-25 RX ADMIN — PIPERACILLIN AND TAZOBACTAM 3375 MG: 3; .375 INJECTION, POWDER, LYOPHILIZED, FOR SOLUTION INTRAVENOUS at 00:39

## 2025-01-25 RX ADMIN — VANCOMYCIN HYDROCHLORIDE 1000 MG: 1 INJECTION, POWDER, LYOPHILIZED, FOR SOLUTION INTRAVENOUS at 13:11

## 2025-01-25 RX ADMIN — CARBIDOPA AND LEVODOPA 1 TABLET: 25; 100 TABLET ORAL at 14:39

## 2025-01-25 RX ADMIN — SODIUM CHLORIDE, PRESERVATIVE FREE 10 ML: 5 INJECTION INTRAVENOUS at 22:00

## 2025-01-25 RX ADMIN — DEXTROSE AND SODIUM CHLORIDE: 5; 900 INJECTION, SOLUTION INTRAVENOUS at 12:38

## 2025-01-25 RX ADMIN — VANCOMYCIN HYDROCHLORIDE 1000 MG: 1 INJECTION, POWDER, LYOPHILIZED, FOR SOLUTION INTRAVENOUS at 22:56

## 2025-01-25 RX ADMIN — PIPERACILLIN AND TAZOBACTAM 3375 MG: 3; .375 INJECTION, POWDER, LYOPHILIZED, FOR SOLUTION INTRAVENOUS at 08:27

## 2025-01-25 RX ADMIN — PIPERACILLIN AND TAZOBACTAM 3375 MG: 3; .375 INJECTION, POWDER, LYOPHILIZED, FOR SOLUTION INTRAVENOUS at 16:38

## 2025-01-25 RX ADMIN — PIPERACILLIN AND TAZOBACTAM 3375 MG: 3; .375 INJECTION, POWDER, LYOPHILIZED, FOR SOLUTION INTRAVENOUS at 22:58

## 2025-01-25 RX ADMIN — SODIUM CHLORIDE, PRESERVATIVE FREE 10 ML: 5 INJECTION INTRAVENOUS at 08:29

## 2025-01-25 ASSESSMENT — PAIN SCALES - GENERAL
PAINLEVEL_OUTOF10: 0

## 2025-01-25 NOTE — ED PROVIDER NOTES
[facility] EMERGENCY DEPARTMENT  EMERGENCY DEPARTMENT ENCOUNTER    Patient Name: Yogi Lynne Sr.  MRN: [id number]  YOB: 1955  Provider: Deandre Cifuentes MD  PCP: Lino Regan DO  Time/Date of evaluation:  1/25/25    History of Presenting Illness     Chief Complaint   Patient presents with    Aspiration     BIBEMS for concern of aspiration. Reports he has been vomiting, has a cough, fever. Reports mentation is at baseline.       HISTORY (Narrative):   Yogi Lynne Sr. is a 70 y.o. male presents with vomiting all day and recent coughing.  The patient has been febrile with a temperature of 102.9°F and tachycardic at 120 bpm. His oxygen saturation was initially around 90% on room air, improving to 93% with supplemental oxygen. The emesis was described as green and clear. The patient reports difficulty coughing up secretions and confirms feeling short of breath.    The patient has a complex medical history, including a recent stroke on December 14th with a large subacute infarct of the right anterior MCA region secondary to an M2 distal occlusion. He experienced an aspiration event while inpatient and was transferred back to ICU on December 18th. He also developed rhabdomyolysis due to prolonged time on the floor with a CK of 12,000.    Medical History  - History of stroke  - Type 2 diabetes  - Alcohol use disorder  - Acute poliomyelitis (1954) resulting in left upper extremity and right lower extremity weakness  - Recent hospitalization at [facility] (December 14) for large subacute infarct of right anterior MCA region, secondary to M2 distal occlusion  - Aspiration event while inpatient  - Rhabdomyolysis due to prolonged downtime  - Parkinson's disease  - Severe aortic valve stenosis  - Thrombocytopenia    Social History  - Substance use: History of alcohol use disorder    Review of Systems  - General: Fever  - Gastrointestinal: Vomiting all day, green and clear emesis  - Respiratory:

## 2025-01-25 NOTE — PROGRESS NOTES
Pt in stable condition.  Lungs coarse bilaterally, non-productive congested cough.  Afebrile, VSS (see flowsheet). Turned & positioned q2hrs.  IV abx as ordered.  Meds administered via PEG, tolerated well.  O2 weaned to NC 3L/min.  Denies pain, denies SOB, O2 Sat upper 90s.  Maintained IVF as ordered, aspiration precautions, and fall precautions maintained.

## 2025-01-25 NOTE — PLAN OF CARE
Speech LAnguage Pathology EVALUATION    Patient: Yogi Lynne  (70 y.o. male)  Date: 1/25/2025  Primary Diagnosis: Acute hypoxemic respiratory failure [J96.01]       Precautions: Aspiration precautions                    ASSESSMENT :  Patient with history of moderate-severe oropharyngeal dysphagia s/p R MCA stroke in 12/2024. Most recent MBS completed at [facility] on 12/23/24 recommend NPO with use of PEG with exception of pureed snacks and ice chips. Patient with poor recall of current diet at SNF, but reports he has not had repeat instrumental imaging since that time. Patient with inconsistent coughing observed following thin liquid and pureed trials. Note prior MBS revealed patient was inconsistently sensate to airway invasion evident by inconsistent cough. Would recommend NPO for now with SLP to follow up with Modified Barium Swallow Study (MBS) on Monday to determine least restrictive and safest PO diet given history of dysphagia s/p R MCA stroke.    Patient will benefit from skilled intervention to address the above impairments.     PLAN :  Recommendations and Planned Interventions:  Diet: NPO with exception of ice chips after strict oral care  -- PEG for primary nutrition/hydration/medication  -- Strict oral hygiene 2-3x/day with use of suction toothbrush  -- Single use oral swabs for patient comfort  -- Modified Barium Swallow Study (MBS) on Monday to determine least restrictive and safest PO diet given history of dysphagia s/p R MCA stroke       Recommend next SLP session: SAVI    Acute SLP Services: SLP Plan of Care: 3 times/week. Patient's rehabilitation potential is considered to be Fair.  Discharge Recommendations: Continue to assess pending progress     SUBJECTIVE:   Patient stated, “whatever you say.”    OBJECTIVE:     Past Medical History:   Diagnosis Date    Arthritis     Hiccups 4/27/2011    [facility] and referred for upper GI: duodenitis    Hx of acute poliomyelitis 1954    no residual problems

## 2025-01-25 NOTE — PROGRESS NOTES
Hospitalist Progress Note    NAME:   Yogi Lynne .   : 1955   MRN: [id number]     Date/Time: 2025 11:42 AM  Patient PCP: Lino Regan DO    Estimated discharge date: ? , back to Nursing home [facility] care ?LTC  Barriers: Clinical improvement, ?Hospice considerations if not improved with resp status      Assessment / Plan:    Severe sepsis POA- resolved  Acute hypoxic respiratory failure POA- improved, was on 15 L high flow oxygen, now down to 6L/m today AM  Respiratory distress with increased WOB  Aspiration pneumonia  -CXR Diffuse interstitial infiltrate. Correlate for interstitial edema versus atypical infection.  -CTA of chest: No PE.  No airspace disease.  -paired Blood cultures  -Influenza screen negative  -SARS-CoV-2 PCR negative  -Patient reportedly was vomiting at the nursing facility.  Despite the negative imaging, he is congested on exam consistent with clinical aspiration.    Cont IV Zosyn and vancomycin  -Continue Oxygen- taper as able- down to 6L/m now  Awaiting step down bed  Keep NPO  SLP eval noted- plan for MBS on Monday, cont nutrition, meds via PEG only  Cont IV fluids with dextrose while NPO     Recent right MCA stroke, 2024  Admitted 24 to [facility] after being found down on the ground by neighbors.  He was diagnosed with a large right MCA territory stroke, distal right M2 segment occlusion on MRA.  Thrombectomy was not pursued due to amount of core infarction and prolonged downtime.  Patient was also diagnosed with right upper lobe pneumonia, right middle lobe nodule 5 mm.  Patient was treated with Zosyn and discharged to nursing home on aspirin 81, Lipitor 80.      Parkinson's disease  Hyperlipidemia  Hypertension  Alcohol use disorder  Postpolio muscular atrophy  -Restart aspirin and Lipitor  -Hold PTA metoprolol and losartan.  IV hydralazine as needed  -Restart Sinemet 25/100 3 times daily        Discussed case with: Pt, RN in ED, SLP  Code Status: Full code for  now was DNR in past admission here at [facility]- will need to confirm with NOK/mPOA before changing to DNR     Surrogate Decision Maker:  Barrera Snider (Other)  [phone] (Home Phone)         DVT Prophylaxis: Lovenox     Baseline: Recent discharge from [facility].  Currently at nursing facility    Subjective:     Chief Complaint / Reason for Physician Visit:  F/U for Aspiration PNA, dysphagia, s/p PEG  \"I am better\".  Discussed with RN events overnight.         Objective:     VITALS:   Last 24hrs VS reviewed since prior progress note. Most recent are:  Patient Vitals for the past 24 hrs:   BP Temp Temp src Pulse Resp SpO2 Height Weight   01/25/25 1114 -- -- -- 71 -- 99 % -- --   01/25/25 1100 (!) 112/50 98.3 °F (36.8 °C) Axillary 71 22 99 % -- --   01/25/25 1000 -- -- -- 74 -- 100 % -- --   01/25/25 0745 -- 97.8 °F (36.6 °C) Oral -- -- -- -- --   01/25/25 0730 (!) 136/57 -- -- 90 27 98 % -- --   01/25/25 0720 -- -- -- 77 -- 99 % -- --   01/25/25 0715 (!) 116/58 -- -- 91 (!) 31 -- -- --   01/25/25 0700 113/60 -- -- 81 23 -- -- --   01/25/25 0645 129/66 -- -- 87 28 -- -- --   01/25/25 0630 (!) 117/50 -- -- 83 26 -- -- --   01/25/25 0515 (!) 147/90 -- -- 93 (!) 33 100 % -- --   01/25/25 0500 (!) 125/51 -- -- 87 (!) 31 100 % -- --   01/25/25 0445 (!) 108/50 -- -- 85 29 100 % -- --   01/25/25 0430 (!) 125/52 -- -- 90 28 99 % -- --   01/25/25 0415 117/68 -- -- 92 28 99 % -- --   01/25/25 0330 (!) 124/54 -- -- 81 (!) 31 96 % -- --   01/25/25 0315 (!) 108/58 -- -- 88 27 98 % -- --   01/25/25 0300 -- 98.1 °F (36.7 °C) Axillary -- -- -- -- --   01/25/25 0215 (!) 120/53 -- -- 80 26 98 % -- --   01/25/25 0201 (!) 127/54 -- -- 82 27 97 % -- --   01/25/25 0200 (!) 127/54 -- -- 81 27 97 % -- --   01/25/25 0145 115/60 -- -- 83 28 98 % -- --   01/25/25 0130 (!) 124/51 -- -- 80 25 98 % -- --   01/25/25 0030 (!) 120/52 99.6 °F (37.6 °C) Oral 82 27 100 % -- --   01/25/25 0015 (!) 110/50 -- -- 86 29 98 % -- --   01/25/25 0000 (!) 102/53 -- -- 88

## 2025-01-25 NOTE — PROGRESS NOTES
Pharmacy Antimicrobial Kinetic Dosing    Indication for Antimicrobials: pna     Current Regimen of Each Antimicrobial:  Zosyn 3.375 gm iv every 8 hr; Start Date ; Day # 2  Vancomycin dosed by pharmacy Start Date ; Day # 2    Previous Antimicrobial Therapy:       Goal Level: Vancomycin -600    Date Dose & Interval Measured (mcg/mL) Predicted AUC 24-48 Predicted AUC 24,ss                          Significant Cultures:    blood - pending   Influenza and COVID neg   MRSA nares ordered    Labs:  Recent Labs     Units 25  0618 25  1738   CREATININE MG/DL 0.71 0.84   BUN MG/DL 18 25*   PROCAL ng/mL  --  0.24   WBC K/uL 15.1* 12.4*   BANDS %  --  7     Temp (24hrs), Av.1 °F (37.3 °C), Min:97.8 °F (36.6 °C), Max:100.8 °F (38.2 °C)      Conditions for Dosing Consideration:     Creatinine Clearance (mL/min): Estimated Creatinine Clearance: 100 mL/min (based on SCr of 0.71 mg/dL).       Impression/Plan:   Gave vancomycin 2000mg x1, will continue with vancomycin 1000mg q12h.  Predicted IVI91-02 = 391, Predicted AUC24,ss = 499  Repeat procal on  to assess peak  Antimicrobial stop date 7 days     Pharmacy will follow daily and adjust medications as appropriate for renal function and/or serum levels.    Thank you,  Lyle Medellin [facility]

## 2025-01-26 LAB
ANION GAP SERPL CALC-SCNC: 4 MMOL/L (ref 2–12)
BASOPHILS # BLD: 0.02 K/UL (ref 0–0.1)
BASOPHILS NFR BLD: 0.3 % (ref 0–1)
BUN SERPL-MCNC: 17 MG/DL (ref 6–20)
BUN/CREAT SERPL: 27 (ref 12–20)
CALCIUM SERPL-MCNC: 8.8 MG/DL (ref 8.5–10.1)
CHLORIDE SERPL-SCNC: 112 MMOL/L (ref 97–108)
CO2 SERPL-SCNC: 23 MMOL/L (ref 21–32)
CREAT SERPL-MCNC: 0.63 MG/DL (ref 0.7–1.3)
DIFFERENTIAL METHOD BLD: ABNORMAL
EOSINOPHIL # BLD: 0.16 K/UL (ref 0–0.4)
EOSINOPHIL NFR BLD: 2.2 % (ref 0–7)
ERYTHROCYTE [DISTWIDTH] IN BLOOD BY AUTOMATED COUNT: 13.5 % (ref 11.5–14.5)
GLUCOSE SERPL-MCNC: 99 MG/DL (ref 65–100)
HCT VFR BLD AUTO: 33.1 % (ref 36.6–50.3)
HGB BLD-MCNC: 11.3 G/DL (ref 12.1–17)
IMM GRANULOCYTES # BLD AUTO: 0.02 K/UL (ref 0–0.04)
IMM GRANULOCYTES NFR BLD AUTO: 0.3 % (ref 0–0.5)
LYMPHOCYTES # BLD: 1.5 K/UL (ref 0.8–3.5)
LYMPHOCYTES NFR BLD: 20.5 % (ref 12–49)
MCH RBC QN AUTO: 30.9 PG (ref 26–34)
MCHC RBC AUTO-ENTMCNC: 34.1 G/DL (ref 30–36.5)
MCV RBC AUTO: 90.4 FL (ref 80–99)
MONOCYTES # BLD: 0.49 K/UL (ref 0–1)
MONOCYTES NFR BLD: 6.7 % (ref 5–13)
NEUTS SEG # BLD: 5.11 K/UL (ref 1.8–8)
NEUTS SEG NFR BLD: 70 % (ref 32–75)
NRBC # BLD: 0 K/UL (ref 0–0.01)
NRBC BLD-RTO: 0 PER 100 WBC
PLATELET # BLD AUTO: 248 K/UL (ref 150–400)
PMV BLD AUTO: 10.2 FL (ref 8.9–12.9)
POTASSIUM SERPL-SCNC: 3.2 MMOL/L (ref 3.5–5.1)
PROCALCITONIN SERPL-MCNC: 1.25 NG/ML
RBC # BLD AUTO: 3.66 M/UL (ref 4.1–5.7)
SODIUM SERPL-SCNC: 139 MMOL/L (ref 136–145)
WBC # BLD AUTO: 7.3 K/UL (ref 4.1–11.1)

## 2025-01-26 PROCEDURE — 80048 BASIC METABOLIC PNL TOTAL CA: CPT

## 2025-01-26 PROCEDURE — 6370000000 HC RX 637 (ALT 250 FOR IP): Performed by: INTERNAL MEDICINE

## 2025-01-26 PROCEDURE — 85025 COMPLETE CBC W/AUTO DIFF WBC: CPT

## 2025-01-26 PROCEDURE — 2500000003 HC RX 250 WO HCPCS: Performed by: INTERNAL MEDICINE

## 2025-01-26 PROCEDURE — 2580000003 HC RX 258: Performed by: INTERNAL MEDICINE

## 2025-01-26 PROCEDURE — 36415 COLL VENOUS BLD VENIPUNCTURE: CPT

## 2025-01-26 PROCEDURE — 2060000000 HC ICU INTERMEDIATE R&B

## 2025-01-26 PROCEDURE — 84145 PROCALCITONIN (PCT): CPT

## 2025-01-26 PROCEDURE — 2700000000 HC OXYGEN THERAPY PER DAY

## 2025-01-26 PROCEDURE — 6360000002 HC RX W HCPCS: Performed by: INTERNAL MEDICINE

## 2025-01-26 RX ORDER — POTASSIUM CHLORIDE 1.5 G/1.58G
20 POWDER, FOR SOLUTION ORAL ONCE
Status: DISCONTINUED | OUTPATIENT
Start: 2025-01-26 | End: 2025-01-26

## 2025-01-26 RX ORDER — CASTOR OIL AND BALSAM, PERU 788; 87 MG/G; MG/G
OINTMENT TOPICAL 2 TIMES DAILY
Status: DISCONTINUED | OUTPATIENT
Start: 2025-01-26 | End: 2025-01-30 | Stop reason: HOSPADM

## 2025-01-26 RX ORDER — POTASSIUM CHLORIDE 1.5 G/1.58G
20 POWDER, FOR SOLUTION ORAL ONCE
Status: COMPLETED | OUTPATIENT
Start: 2025-01-26 | End: 2025-01-26

## 2025-01-26 RX ADMIN — CARBIDOPA AND LEVODOPA 1 TABLET: 25; 100 TABLET ORAL at 10:22

## 2025-01-26 RX ADMIN — Medication: at 23:42

## 2025-01-26 RX ADMIN — ASPIRIN 81 MG: 81 TABLET, CHEWABLE ORAL at 10:22

## 2025-01-26 RX ADMIN — POTASSIUM CHLORIDE 20 MEQ: 1.5 FOR SOLUTION ORAL at 10:22

## 2025-01-26 RX ADMIN — DEXTROSE AND SODIUM CHLORIDE: 5; 900 INJECTION, SOLUTION INTRAVENOUS at 18:25

## 2025-01-26 RX ADMIN — ATORVASTATIN CALCIUM 40 MG: 40 TABLET, FILM COATED ORAL at 21:54

## 2025-01-26 RX ADMIN — ENOXAPARIN SODIUM 40 MG: 100 INJECTION SUBCUTANEOUS at 09:25

## 2025-01-26 RX ADMIN — VANCOMYCIN HYDROCHLORIDE 1000 MG: 1 INJECTION, POWDER, LYOPHILIZED, FOR SOLUTION INTRAVENOUS at 23:45

## 2025-01-26 RX ADMIN — SODIUM CHLORIDE, PRESERVATIVE FREE 10 ML: 5 INJECTION INTRAVENOUS at 21:54

## 2025-01-26 RX ADMIN — VANCOMYCIN HYDROCHLORIDE 1000 MG: 1 INJECTION, POWDER, LYOPHILIZED, FOR SOLUTION INTRAVENOUS at 11:53

## 2025-01-26 RX ADMIN — CARBIDOPA AND LEVODOPA 1 TABLET: 25; 100 TABLET ORAL at 21:54

## 2025-01-26 RX ADMIN — PIPERACILLIN AND TAZOBACTAM 3375 MG: 3; .375 INJECTION, POWDER, LYOPHILIZED, FOR SOLUTION INTRAVENOUS at 15:11

## 2025-01-26 RX ADMIN — SODIUM CHLORIDE, PRESERVATIVE FREE 10 ML: 5 INJECTION INTRAVENOUS at 09:33

## 2025-01-26 RX ADMIN — DEXTROSE AND SODIUM CHLORIDE: 5; 900 INJECTION, SOLUTION INTRAVENOUS at 02:30

## 2025-01-26 RX ADMIN — PIPERACILLIN AND TAZOBACTAM 3375 MG: 3; .375 INJECTION, POWDER, LYOPHILIZED, FOR SOLUTION INTRAVENOUS at 23:25

## 2025-01-26 RX ADMIN — PIPERACILLIN AND TAZOBACTAM 3375 MG: 3; .375 INJECTION, POWDER, LYOPHILIZED, FOR SOLUTION INTRAVENOUS at 09:29

## 2025-01-26 RX ADMIN — CARBIDOPA AND LEVODOPA 1 TABLET: 25; 100 TABLET ORAL at 13:26

## 2025-01-26 NOTE — ED NOTES
Perineal care preformed and external urinary device replace. Linens changed on bed pt denies any needs at this time, pt lying in position of comfort, call bell within reach.

## 2025-01-26 NOTE — PLAN OF CARE
Problem: Respiratory - Adult  Goal: Achieves optimal ventilation and oxygenation  1/26/2025 0007 by Alesia Kamara RN  Outcome: Progressing  1/25/2025 1124 by Amy Nicolas RCP  Outcome: Progressing     Problem: SLP Adult - Impaired Swallowing  Goal: By Discharge: Advance to least restrictive diet without signs or symptoms of aspiration for planned discharge setting.  See evaluation for individualized goals.  Description: Speech Pathology Goals  Initiated 1/25/2025     1. Patient will participate in MBS to determine least restrictive diet acutely within 7 days.  2. Patient will tolerate least restrictive diet without signs of aspiration or decline in respiratory status within 7 days.    1/25/2025 1250 by Gregorio Bray, SLP  Outcome: Progressing     Problem: Safety - Adult  Goal: Free from fall injury  Outcome: Progressing     Problem: Pain  Goal: Verbalizes/displays adequate comfort level or baseline comfort level  Outcome: Progressing     Problem: Skin/Tissue Integrity  Goal: Skin integrity remains intact  Description: 1.  Monitor for areas of redness and/or skin breakdown  2.  Assess vascular access sites hourly  3.  Every 4-6 hours minimum:  Change oxygen saturation probe site  4.  Every 4-6 hours:  If on nasal continuous positive airway pressure, respiratory therapy assess nares and determine need for appliance change or resting period  Outcome: Progressing     Problem: ABCDS Injury Assessment  Goal: Absence of physical injury  Outcome: Progressing

## 2025-01-26 NOTE — PROGRESS NOTES
Hospitalist Progress Note    NAME:   Yogi Lynne .   : 1955   MRN: [id number]     Date/Time: 2025 9:36 AM  Patient PCP: Lino Regan DO    Estimated discharge date: ? , back to Nursing home [facility] care ?LTC  Barriers: Clinical improvement, ?Hospice considerations if not improved with resp status      Assessment / Plan:    Severe sepsis POA- resolved  Acute hypoxic respiratory failure POA- improved, was on 15 L high flow oxygen, now down to 3 L/m today AM  Respiratory distress with increased WOB  Aspiration pneumonia  -CXR Diffuse interstitial infiltrate. Correlate for interstitial edema versus atypical infection.  -CTA of chest: No PE.  No airspace disease.  -paired Blood cultures  -Influenza screen negative  -SARS-CoV-2 PCR negative  -Patient reportedly was vomiting at the nursing facility.  Despite the negative imaging, he is congested on exam consistent with clinical aspiration.    Cont IV Zosyn and vancomycin  -Continue Oxygen- taper as able- down to 3 L/m now  Awaiting step down bed  Keep NPO per SLP  SLP eval noted- plan for MBS on Monday, cont nutrition, meds via PEG only  Cont IV fluids with dextrose while NPO     Recent right MCA stroke, 2024  Admitted 24 to [facility] after being found down on the ground by neighbors.  He was diagnosed with a large right MCA territory stroke, distal right M2 segment occlusion on MRA.  Thrombectomy was not pursued due to amount of core infarction and prolonged downtime.  Patient was also diagnosed with right upper lobe pneumonia, right middle lobe nodule 5 mm.  Patient was treated with Zosyn and discharged to nursing home on aspirin 81, Lipitor 80.      Parkinson's disease  Hyperlipidemia  Hypertension  Alcohol use disorder  Postpolio muscular atrophy  -Restart aspirin and Lipitor  -Hold PTA metoprolol and losartan.  IV hydralazine as needed  -Restart Sinemet 25/100 3 times daily        Discussed case with: Pt, RN, SLP  Code Status:  DNR

## 2025-01-26 NOTE — PROGRESS NOTES
End of Shift Note    Bedside shift change report given to Danni WATERMAN  (oncoming nurse) by Annie Burr RN (offgoing nurse).  Report included the following information SBAR    Shift worked:  Days      Shift summary and any significant changes:     Patient wanted to drink and advised of aspiration risk     Concerns for physician to address:       Zone phone for oncoming shift:          Activity:  Level of Assistance: Maximum assist, patient does 25-49%  Number times ambulated in hallways past shift: 0  Number of times OOB to chair past shift: 0    Cardiac:   Cardiac Monitoring: Yes      Cardiac Rhythm: Sinus rhythm    Access:  Current line(s): PIV     Genitourinary:   Urinary Status: External catheter    Respiratory:   O2 Device: Nasal cannula  Chronic home O2 use?: NO  Incentive spirometer at bedside: NO    GI:  Last BM (including prior to admit): 01/25/25  Current diet:  Diet NPO Exceptions are: Sips of Water with Meds  Passing flatus: YES    Pain Management:   Patient states pain is manageable on current regimen: YES    Skin:  Conrado Scale Score: 13  Interventions: Wound Offloading (Prevention Methods): Bed, pressure redistribution/air    Patient Safety:  Fall Risk: Nursing Judgement-Fall Risk High(Add Comments): Yes  Fall Risk Interventions  Nursing Judgement-Fall Risk High(Add Comments): Yes  Toilet Every 2 Hours-In Advance of Need: Yes  Hourly Visual Checks: Awake, In bed  Fall Visual Posted: Fall sign posted, Socks  Room Door Open: Deferred to promote rest  Alarm On: Bed, Chair  Patient Moved Closer to Nursing Station: No    Active Consults:   IP CONSULT TO PHARMACY    Length of Stay:  Expected LOS: 4  Actual LOS: 2    Annie Burr RN

## 2025-01-26 NOTE — CARE COORDINATION
Transition of Care Plan:    RUR: 11%-\"Low Risk\"  Prior Level of Functioning: Needs assistance with his ADLs and IADLs  Disposition: The patient's family is hopeful he can return to [facility] on d/c  MIKAELA: 1/28/25  If SNF or IPR: Date FOC offered: N/A  Follow up appointments: PCP & Specialists as indicated   DME needed: The patient has all needed DME at SNF  Transportation at discharge: The patient will need medical transport at d/c   IM/IMM Medicare/ letter given: 1st IM letter given and signed on 1/26/25  Is patient a  and connected with VA? N/A   If yes, was  transfer form completed and VA notified?   Caregiver Contact: Rai Lynne, Brother, Phone: [phone]-the patient's brother is his [facility]  Discharge Caregiver contacted prior to discharge? CM spoke to the patient's friend, Leno Mirza (phone: [phone]), to complete assessment   Care Conference needed? No  Barriers to discharge: Pending medical stability     CM spoke to the patient's friend, Leno Mirza (phone: [phone]), to complete assessment. Mr. Mirza reported that he has been a friend of the patient for the past 50+ years. He stated that the patient is  and he has no children. He stated that Barrera Snider is the patient's brother-in-law but they are  not very close. He reported that the patient's brother, Rai Lynne (phone: [phone]) resides in [place] and is the [facility]'s [facility]. He reported that the patient has been at [facility] since his stroke in December. He is hopeful the patient can return to this facility. TATE has sent the patient's referral to [facility] via [employer].       01/26/25 4005   Service Assessment   Patient Orientation Other (see comment)  (TATE completed assessment with patient's friend, Leno Mirza)   Cognition Other (see comment)  (Patient with recent stroke)   History Provided By Friend  (TATE completed assessment with the patient's friend, Leno

## 2025-01-26 NOTE — PROGRESS NOTES
Admission data base for home medication not completed , patient unable to remember , oriented and disoriented sometimes,to be completed when the patient is able to participate on the same

## 2025-01-26 NOTE — PLAN OF CARE
Problem: Respiratory - Adult  Goal: Achieves optimal ventilation and oxygenation  1/26/2025 0814 by Annie Burr RN  Outcome: Progressing  1/26/2025 0007 by Alesia Kamara RN  Outcome: Progressing     Problem: Safety - Adult  Goal: Free from fall injury  1/26/2025 0814 by Annie Burr RN  Outcome: Progressing  1/26/2025 0007 by Alesia Kamara RN  Outcome: Progressing     Problem: Discharge Planning  Goal: Discharge to home or other facility with appropriate resources  Outcome: Progressing     Problem: Pain  Goal: Verbalizes/displays adequate comfort level or baseline comfort level  1/26/2025 0814 by Annie Burr RN  Outcome: Progressing  1/26/2025 0007 by Alesia Kamara RN  Outcome: Progressing     Problem: Skin/Tissue Integrity  Goal: Skin integrity remains intact  Description: 1.  Monitor for areas of redness and/or skin breakdown  2.  Assess vascular access sites hourly  3.  Every 4-6 hours minimum:  Change oxygen saturation probe site  4.  Every 4-6 hours:  If on nasal continuous positive airway pressure, respiratory therapy assess nares and determine need for appliance change or resting period  1/26/2025 0814 by Annie Burr RN  Outcome: Progressing  1/26/2025 0007 by Alesia Kamara RN  Outcome: Progressing     Problem: ABCDS Injury Assessment  Goal: Absence of physical injury  1/26/2025 0814 by Annie Burr RN  Outcome: Progressing  1/26/2025 0007 by Alesia Kamara RN  Outcome: Progressing

## 2025-01-26 NOTE — PROGRESS NOTES
Pharmacy Antimicrobial Kinetic Dosing    Indication for Antimicrobials: pna     Current Regimen of Each Antimicrobial:  Zosyn 3.375 gm iv every 8 hr; Start Date ; Day # 3  Vancomycin dosed by pharmacy Start Date ; Day # 3    Previous Antimicrobial Therapy:       Goal Level: Vancomycin -600    Date Dose & Interval Measured (mcg/mL) Predicted AUC 24-48 Predicted AUC 24,ss                          Significant Cultures:    blood - pending   Influenza and COVID neg   MRSA nares ordered    Labs:  Recent Labs     Units 25  0618 25  1738   CREATININE MG/DL 0.71 0.84   BUN MG/DL 18 25*   PROCAL ng/mL  --  0.24   WBC K/uL 15.1* 12.4*   BANDS %  --  7     Temp (24hrs), Av.1 °F (37.3 °C), Min:97.8 °F (36.6 °C), Max:100.8 °F (38.2 °C)      Conditions for Dosing Consideration:     Creatinine Clearance (mL/min): Estimated Creatinine Clearance: 100 mL/min (based on SCr of 0.71 mg/dL).       Impression/Plan:   Continue with vancomycin 1000mg q12h.  Predicted YBF02-87 = 391, Predicted AUC24,ss = 499  Will order vancomycin random with AM labs on  procal 1.25, elevated. Likely bacterial pneumonia  MRSA nasal swab ordered per [facility]  Antimicrobial stop date 7 days     Pharmacy will follow daily and adjust medications as appropriate for renal function and/or serum levels.    Thank you,  Lyle Medellin [facility]

## 2025-01-26 NOTE — FLOWSHEET NOTE
01/25/25 1930   Handoff   Communication Given Transfer Handoff   Handoff Given To Phyllis WATERMAN   Handoff Received From Celso WATERMAN   Handoff Communication Telephone   Time Handoff Given 1955   End of Shift Check Performed N/A

## 2025-01-27 ENCOUNTER — APPOINTMENT (OUTPATIENT)
Facility: HOSPITAL | Age: 70
DRG: 871 | End: 2025-01-27
Payer: MEDICARE

## 2025-01-27 LAB
ANION GAP SERPL CALC-SCNC: 6 MMOL/L (ref 2–12)
BACTERIA SPEC CULT: NORMAL
BACTERIA SPEC CULT: NORMAL
BASOPHILS # BLD: 0.04 K/UL (ref 0–0.1)
BASOPHILS NFR BLD: 0.5 % (ref 0–1)
BUN SERPL-MCNC: 10 MG/DL (ref 6–20)
BUN/CREAT SERPL: 15 (ref 12–20)
CALCIUM SERPL-MCNC: 7.9 MG/DL (ref 8.5–10.1)
CHLORIDE SERPL-SCNC: 116 MMOL/L (ref 97–108)
CO2 SERPL-SCNC: 22 MMOL/L (ref 21–32)
CREAT SERPL-MCNC: 0.65 MG/DL (ref 0.7–1.3)
DIFFERENTIAL METHOD BLD: ABNORMAL
EOSINOPHIL # BLD: 0.28 K/UL (ref 0–0.4)
EOSINOPHIL NFR BLD: 3.4 % (ref 0–7)
ERYTHROCYTE [DISTWIDTH] IN BLOOD BY AUTOMATED COUNT: 13.7 % (ref 11.5–14.5)
GLUCOSE SERPL-MCNC: 359 MG/DL (ref 65–100)
HCT VFR BLD AUTO: 33.7 % (ref 36.6–50.3)
HGB BLD-MCNC: 11.1 G/DL (ref 12.1–17)
IMM GRANULOCYTES # BLD AUTO: 0.03 K/UL (ref 0–0.04)
IMM GRANULOCYTES NFR BLD AUTO: 0.4 % (ref 0–0.5)
LYMPHOCYTES # BLD: 2.23 K/UL (ref 0.8–3.5)
LYMPHOCYTES NFR BLD: 27.5 % (ref 12–49)
MCH RBC QN AUTO: 31.4 PG (ref 26–34)
MCHC RBC AUTO-ENTMCNC: 32.9 G/DL (ref 30–36.5)
MCV RBC AUTO: 95.5 FL (ref 80–99)
MONOCYTES # BLD: 0.55 K/UL (ref 0–1)
MONOCYTES NFR BLD: 6.8 % (ref 5–13)
NEUTS SEG # BLD: 4.99 K/UL (ref 1.8–8)
NEUTS SEG NFR BLD: 61.4 % (ref 32–75)
NRBC # BLD: 0 K/UL (ref 0–0.01)
NRBC BLD-RTO: 0 PER 100 WBC
PLATELET # BLD AUTO: 242 K/UL (ref 150–400)
PMV BLD AUTO: 10.3 FL (ref 8.9–12.9)
POTASSIUM SERPL-SCNC: 2.9 MMOL/L (ref 3.5–5.1)
RBC # BLD AUTO: 3.53 M/UL (ref 4.1–5.7)
SERVICE CMNT-IMP: NORMAL
SODIUM SERPL-SCNC: 144 MMOL/L (ref 136–145)
VANCOMYCIN SERPL-MCNC: 18.5 UG/ML
WBC # BLD AUTO: 8.1 K/UL (ref 4.1–11.1)

## 2025-01-27 PROCEDURE — 80202 ASSAY OF VANCOMYCIN: CPT

## 2025-01-27 PROCEDURE — 2060000000 HC ICU INTERMEDIATE R&B

## 2025-01-27 PROCEDURE — 6370000000 HC RX 637 (ALT 250 FOR IP): Performed by: INTERNAL MEDICINE

## 2025-01-27 PROCEDURE — 2580000003 HC RX 258: Performed by: INTERNAL MEDICINE

## 2025-01-27 PROCEDURE — 6360000002 HC RX W HCPCS

## 2025-01-27 PROCEDURE — 80048 BASIC METABOLIC PNL TOTAL CA: CPT

## 2025-01-27 PROCEDURE — 2500000003 HC RX 250 WO HCPCS: Performed by: INTERNAL MEDICINE

## 2025-01-27 PROCEDURE — 36415 COLL VENOUS BLD VENIPUNCTURE: CPT

## 2025-01-27 PROCEDURE — 92611 MOTION FLUOROSCOPY/SWALLOW: CPT

## 2025-01-27 PROCEDURE — 85025 COMPLETE CBC W/AUTO DIFF WBC: CPT

## 2025-01-27 PROCEDURE — 74230 X-RAY XM SWLNG FUNCJ C+: CPT

## 2025-01-27 PROCEDURE — 6360000002 HC RX W HCPCS: Performed by: INTERNAL MEDICINE

## 2025-01-27 RX ORDER — FOLIC ACID 1 MG/1
1 TABLET ORAL DAILY
Status: ON HOLD | COMMUNITY

## 2025-01-27 RX ORDER — NYSTATIN 100000 [USP'U]/ML
500000 SUSPENSION ORAL 3 TIMES DAILY
Status: ON HOLD | COMMUNITY

## 2025-01-27 RX ORDER — POTASSIUM CHLORIDE 7.45 MG/ML
10 INJECTION INTRAVENOUS
Status: COMPLETED | OUTPATIENT
Start: 2025-01-27 | End: 2025-01-27

## 2025-01-27 RX ORDER — M-VIT,TX,IRON,MINS/CALC/FOLIC 27MG-0.4MG
1 TABLET ORAL DAILY
Status: ON HOLD | COMMUNITY

## 2025-01-27 RX ORDER — ATORVASTATIN CALCIUM 80 MG/1
80 TABLET, FILM COATED ORAL DAILY
Status: ON HOLD | COMMUNITY

## 2025-01-27 RX ORDER — SENNA AND DOCUSATE SODIUM 50; 8.6 MG/1; MG/1
1 TABLET, FILM COATED ORAL DAILY
Status: ON HOLD | COMMUNITY

## 2025-01-27 RX ORDER — FLUOXETINE 20 MG/5ML
20 SOLUTION ORAL DAILY
Status: ON HOLD | COMMUNITY

## 2025-01-27 RX ORDER — ESOMEPRAZOLE MAGNESIUM 20 MG/1
20 GRANULE, DELAYED RELEASE ORAL DAILY
Status: ON HOLD | COMMUNITY

## 2025-01-27 RX ORDER — ASPIRIN 81 MG/1
81 TABLET, CHEWABLE ORAL DAILY
Status: ON HOLD | COMMUNITY

## 2025-01-27 RX ORDER — METOPROLOL SUCCINATE 25 MG/1
12.5 TABLET, EXTENDED RELEASE ORAL 2 TIMES DAILY
Status: ON HOLD | COMMUNITY

## 2025-01-27 RX ORDER — THIAMINE HYDROCHLORIDE 100 MG/ML
100 INJECTION, SOLUTION INTRAMUSCULAR; INTRAVENOUS DAILY
Status: ON HOLD | COMMUNITY

## 2025-01-27 RX ORDER — KETOCONAZOLE 20 MG/G
1 CREAM TOPICAL DAILY
Status: ON HOLD | COMMUNITY

## 2025-01-27 RX ORDER — CARBIDOPA AND LEVODOPA 25; 100 MG/1; MG/1
1 TABLET ORAL 3 TIMES DAILY
Status: ON HOLD | COMMUNITY

## 2025-01-27 RX ORDER — LOSARTAN POTASSIUM 25 MG/1
25 TABLET ORAL DAILY
Status: ON HOLD | COMMUNITY

## 2025-01-27 RX ORDER — IPRATROPIUM BROMIDE AND ALBUTEROL SULFATE 2.5; .5 MG/3ML; MG/3ML
1 SOLUTION RESPIRATORY (INHALATION) EVERY 6 HOURS
Status: ON HOLD | COMMUNITY

## 2025-01-27 RX ADMIN — Medication: at 08:39

## 2025-01-27 RX ADMIN — POTASSIUM CHLORIDE 10 MEQ: 10 INJECTION, SOLUTION INTRAVENOUS at 10:02

## 2025-01-27 RX ADMIN — POTASSIUM CHLORIDE 10 MEQ: 10 INJECTION, SOLUTION INTRAVENOUS at 07:16

## 2025-01-27 RX ADMIN — CARBIDOPA AND LEVODOPA 1 TABLET: 25; 100 TABLET ORAL at 08:21

## 2025-01-27 RX ADMIN — ASPIRIN 81 MG: 81 TABLET, CHEWABLE ORAL at 08:21

## 2025-01-27 RX ADMIN — VANCOMYCIN HYDROCHLORIDE 1000 MG: 1 INJECTION, POWDER, LYOPHILIZED, FOR SOLUTION INTRAVENOUS at 10:59

## 2025-01-27 RX ADMIN — SODIUM CHLORIDE, PRESERVATIVE FREE 10 ML: 5 INJECTION INTRAVENOUS at 08:19

## 2025-01-27 RX ADMIN — POTASSIUM CHLORIDE 10 MEQ: 10 INJECTION, SOLUTION INTRAVENOUS at 06:22

## 2025-01-27 RX ADMIN — PIPERACILLIN AND TAZOBACTAM 3375 MG: 3; .375 INJECTION, POWDER, LYOPHILIZED, FOR SOLUTION INTRAVENOUS at 08:32

## 2025-01-27 RX ADMIN — DEXTROSE AND SODIUM CHLORIDE: 5; 900 INJECTION, SOLUTION INTRAVENOUS at 08:35

## 2025-01-27 RX ADMIN — Medication: at 20:45

## 2025-01-27 RX ADMIN — ATORVASTATIN CALCIUM 40 MG: 40 TABLET, FILM COATED ORAL at 20:45

## 2025-01-27 RX ADMIN — ENOXAPARIN SODIUM 40 MG: 100 INJECTION SUBCUTANEOUS at 08:20

## 2025-01-27 RX ADMIN — POTASSIUM CHLORIDE 10 MEQ: 10 INJECTION, SOLUTION INTRAVENOUS at 08:34

## 2025-01-27 RX ADMIN — SODIUM CHLORIDE, PRESERVATIVE FREE 10 ML: 5 INJECTION INTRAVENOUS at 20:46

## 2025-01-27 RX ADMIN — SODIUM CHLORIDE 50 ML: 9 INJECTION, SOLUTION INTRAVENOUS at 23:57

## 2025-01-27 RX ADMIN — CARBIDOPA AND LEVODOPA 1 TABLET: 25; 100 TABLET ORAL at 20:45

## 2025-01-27 RX ADMIN — CARBIDOPA AND LEVODOPA 1 TABLET: 25; 100 TABLET ORAL at 14:36

## 2025-01-27 RX ADMIN — VANCOMYCIN HYDROCHLORIDE 1000 MG: 1 INJECTION, POWDER, LYOPHILIZED, FOR SOLUTION INTRAVENOUS at 23:53

## 2025-01-27 RX ADMIN — PIPERACILLIN AND TAZOBACTAM 3375 MG: 3; .375 INJECTION, POWDER, LYOPHILIZED, FOR SOLUTION INTRAVENOUS at 16:22

## 2025-01-27 NOTE — PROGRESS NOTES
Pharmacy Antimicrobial Kinetic Dosing    Indication for Antimicrobials: pna     Current Regimen of Each Antimicrobial:  Zosyn 3.375 gm iv every 8 hr; Start Date ; Day # 4  Vancomycin dosed by pharmacy Start Date ; Day # 4    Previous Antimicrobial Therapy:    Goal Level: Vancomycin -600    Date Dose & Interval Measured (mcg/mL) Predicted AUC 24-48 Predicted AUC 24,ss    1000 mg q12h 18.5 (not trough) 439 454                   Significant Cultures:    blood - pending   MRSA: pending    Labs:  Recent Labs     Units 25  0618 25  1738   CREATININE MG/DL 0.71 0.84   BUN MG/DL 18 25*   PROCAL ng/mL  --  0.24   WBC K/uL 15.1* 12.4*   BANDS %  --  7     Temp (24hrs), Av.1 °F (37.3 °C), Min:97.8 °F (36.6 °C), Max:100.8 °F (38.2 °C)      Conditions for Dosing Consideration:     Creatinine Clearance (mL/min): Estimated Creatinine Clearance: 100 mL/min (based on SCr of 0.71 mg/dL).       Impression/Plan:   Vancomycin level of 18.5 extrapolates to therapeutic AUC. Continue vanc 1000 mg q12h  MRSA nasal swab pending  Continue Zosyn  Antimicrobial stop date      Pharmacy will follow daily and adjust medications as appropriate for renal function and/or serum levels.    Thank you,  Lyle Medellin [facility]

## 2025-01-27 NOTE — PROGRESS NOTES
End of Shift Note    Bedside shift change report given to RN (oncoming nurse) by Zena Encarnacion RN (offgoing nurse).  Report included the following information SBAR    Shift worked:  7 am- 7 pm     Shift summary and any significant changes:     Pt had barium swallow test, new peg tube feeding ordered with moist and minced diet/ thin liquids    Pt HR dropped to mid 40's and blood pressure was elevated (170's systolic). Dr Pugh notified, no changed in orders. Metoprolol is being held.      Concerns for physician to address:  -     Zone phone for oncoming shift:   -       Activity:  Level of Assistance: Dependent, patient does less than 25%  Number times ambulated in hallways past shift: 0  Number of times OOB to chair past shift: 0    Cardiac:   Cardiac Monitoring: Yes      Cardiac Rhythm: Sinus rhythm    Access:  Current line(s): PIV    Genitourinary:   Urinary Status: Voiding    Respiratory:   O2 Device: None (Room air)  Chronic home O2 use?: NO  Incentive spirometer at bedside: NO    GI:  Last BM (including prior to admit): 01/25/25  Current diet:  ADULT DIET; Dysphagia - Minced and Moist  ADULT TUBE FEEDING; PEG; Standard with Fiber; Continuous; 20; Yes; 10; Q 4 hours; 60; 180; Q 4 hours  DIET ONE TIME MESSAGE;  Passing flatus: YES    Pain Management:   Patient states pain is manageable on current regimen: YES    Skin:  Conrado Scale Score: 13  Interventions: Wound Offloading (Prevention Methods): Bed, pressure redistribution/air    Patient Safety:  Fall Risk: Nursing Judgement-Fall Risk High(Add Comments): Yes  Fall Risk Interventions  Nursing Judgement-Fall Risk High(Add Comments): Yes  Toilet Every 2 Hours-In Advance of Need: Yes  Hourly Visual Checks: In bed  Fall Visual Posted: Fall sign posted  Room Door Open: Deferred to decrease stimulation  Alarm On: Bed  Patient Moved Closer to Nursing Station: No    Active Consults:   IP CONSULT TO PHARMACY    Length of Stay:  Expected LOS: 4  Actual LOS:  3    Zena Encarnacion RN       Hydroxychloroquine Pregnancy And Lactation Text: This medication has been shown to cause fetal harm but it isn't assigned a Pregnancy Risk Category. There are small amounts excreted in breast milk.

## 2025-01-27 NOTE — PLAN OF CARE
SPEECH PATHOLOGY MODIFIED BARIUM SWALLOW STUDY  Patient: Yogi Lynne . (70 y.o. male)  Date: 1/27/2025  Primary Diagnosis: Acute hypoxemic respiratory failure [J96.01]  Acute hypoxic respiratory failure [J96.01]       Precautions:                      ASSESSMENT :  Patient presents with mild-moderate oral dysphagia and mild dysphagia, likely related to patient's recent R MCA stroke (12/2024). Note this is an improvement from prior instrumental imaging completed at [facility]. L labial asymmetry persists. Oral deficits are characterized by prolonged mastication as solids, requiring the use of a liquid wash to assist with oral cavity clearance. Pharyngeal dysphagia is characterized by mildly reduced hyolaryngeal excursion. Swallow initiation consistently observed at the level of the valleculae. This results in trace intermittent penetration with thin liquids to the level of the vocal folds during the swallow, which clears with the force of the swallow although not consistently. On trials where trace residual observed in the laryngeal vestibule, patient cued for volitional throat clear/cough, which was assessed to be subjectively weak. No aspiration visualized with any consistency trialed throughout study. Intermittent trace-mild pyriform sinus residue observed with thin liquids. No pharyngeal residue present for consistencies of increased viscosity.    Suspect current swallow dysfunction is the result of recent R MCA stroke. Recommend Minced&Moist/Thin Liquid diet. Patient will from being followed by acutely.    Patient will benefit from skilled intervention to address the above impairments.     PLAN :  Recommendations and Planned Interventions:  Diet: Minced and moist and thin liquids  -- Recommend medication whole in puree (applesauce, pudding, etc.) acutely  -- 1:1 assistance/supervision with meals   -- Aspiration precautions: upright for all PO intake, small bites/sips, single sips 1 at a time, alternate  Views: Fluoro;Lateral  Radiologist: Dr. Haque  Patient Position: Upright in chair    Trial 1: Trial 2:   Consistencies Administered: Thin - teaspoon;Thin - straw Consistencies Administered: Pureed   How Presented: SLP-fed/Presented;Spoon;Straw How Presented: Self-fed/presented   Bolus Acceptance: No impairment Bolus Acceptance : No impairment   Bolus Formation/Control: No impairment Bolus Formation/Control: No impairment     Propulsion: No impairment   Propulsion: No impairment   Oral Residue: None Oral Residue: None     Initiation of Swallow: Triggered at vallecula   Timing: Pooling 1-5 sec;Vallecular Timing: Pooling 1-5 sec;Vallecular   Penetration: During swallow;To cords;From initial swallow Penetration: None   Aspiration/Timing: No evidence of aspiration Aspiration/Timing: No evidence of aspiration   Pharyngeal Clearance: Less than 10%;Vallecular residue;Pyriform residue Pharyngeal Clearance: No residue   Attempted Modifications: Double swallow             Trial 3:   Consistencies Administered: Regular   How Presented: Self-fed/presented   Bolus Acceptance : No impairment   Bolus Formation/Control: Impaired   Type of Impairment: Delayed;Mastication Propulsion: Delayed (# of seconds)   Oral Residue: None (Given increased time)   Initiation of Swallow: Triggered at vallecula   Timing: No impairment   Penetration: None   Aspiration/Timing: No evidence of aspiration   Pharyngeal Clearance: Less than 10%;Pyriform residue   Attempted Modifications: Alternate liquids/solids         Swallowing Physiology  Decreased Tongue Base Retraction?: No  Laryngeal Elevation: Other (comment) (Mildly reduced hyolaryngeal excursion)  Penetration-Aspiration Scale (PAS): 3 - Material enters the airway, remains above the vocal folds, and is not ejected from airway  Pharyngeal Symmetry: Not assessed  Pharyngeal-Esophageal Segment: Decreased relaxation of upper esophageal segment  Pharyngeal Dysfunction: Decreased

## 2025-01-27 NOTE — PROGRESS NOTES
Comprehensive Nutrition Assessment    Type and Reason for Visit:  Initial, Positive nutrition screen    Nutrition Recommendations/Plan:   Diet per SLP  Rec initiate TF to meet needs:   Jevity 1.5 at 20mL/hr, advancing 10mL q4hrs as tolerated to goal rate of 60mL/hr, H2O flushes 180mL q4hrs  Provides 2160kcal, 92g protein, 2174mL H2O  Monitor and record PO intakes, TF rate, flushes, and Bms in I/Os     Malnutrition Assessment:  Malnutrition Status:  At risk for malnutrition (01/27/25 1436)    Context:  Acute Illness     Findings of the 6 clinical characteristics of malnutrition:  Energy Intake:  50% or less of estimated energy requirements for 5 or more days  Weight Loss:  No weight loss     Body Fat Loss:  No body fat loss     Muscle Mass Loss:  No muscle mass loss    Fluid Accumulation:  No fluid accumulation     Strength:  Not Performed    Nutrition Assessment:    Admitted for acute hypoxemia respiratory failure. PMHx includes recent stroke, Parkinson's, HTN, postpolio muscle atrophy. Screened for PEG tube. Pt poor historian at interview, no family in room. Pt unable to report if PEG tube was in use PTA; he reports only taking PO by mouth occasionally. SLP today rec'd MM5/thin. No recent significant weight loss from available EMR wt hx. Will leave rec's for TF - likely requires at least supplememental TF to meet needs, if not 100%.    Nutrition Related Findings:    Labs: Na 144, K 2.9, BUN 10, Creat 0.65, Gluc 359. Meds: atorvastatin, carbidopa-levodopa, KCl, D5%/0.9%NaCl. No edema. BM 1/25. Wound Type: Pressure Injury       Current Nutrition Intake & Therapies:    Average Meal Intake: Unable to assess (diet just advanced after lunchtime)  Average Supplements Intake: None Ordered  ADULT DIET; Dysphagia - Minced and Moist    Anthropometric Measures:  Height: 180.3 cm (5' 10.98\")  Ideal Body Weight (IBW): 172 lbs (78 kg)    Current Body Weight: 84.2 kg (185 lb 10 oz), 107.9 % IBW. Weight Source: Bed  scale  Current BMI (kg/m2): 25.9  BMI Categories: Overweight (BMI 25.0-29.9)    Estimated Daily Nutrient Needs:  Energy Requirements Based On: Kcal/kg  Weight Used for Energy Requirements: Current  Energy (kcal/day): 2105-2526kcal (25kcal/kg)  Weight Used for Protein Requirements: Current  Protein (g/day): 84-101g (1-1.2g/kg)  Method Used for Fluid Requirements: 1 ml/kcal  Fluid (ml/day): 2105mL    Nutrition Diagnosis:   Predicted inadequate energy intake related to biting/chewing (masticatory) difficulty, cognitive or neurological impairment as evidenced by nutrition support - enteral nutrition    Nutrition Interventions:   Food and/or Nutrient Delivery: Continue Current Diet, Start Tube Feeding  Nutrition Education/Counseling: No recommendation at this time  Coordination of Nutrition Care: Continue to monitor while inpatient    Goals:  Goals: Meet at least 75% of estimated needs, by next RD assessment    Nutrition Monitoring and Evaluation:   Behavioral-Environmental Outcomes: None Identified  Food/Nutrient Intake Outcomes: Diet Advancement/Tolerance, Food and Nutrient Intake  Physical Signs/Symptoms Outcomes: Meal Time Behavior, Weight, GI Status    Discharge Planning:    Too soon to determine     Yessica Vance RD  Contact: [phone]

## 2025-01-27 NOTE — PLAN OF CARE
Problem: Safety - Adult  Goal: Free from fall injury  Outcome: Progressing  Flowsheets (Taken 1/27/2025 1137)  Free From Fall Injury: Instruct family/caregiver on patient safety     Problem: Pain  Goal: Verbalizes/displays adequate comfort level or baseline comfort level  Outcome: Progressing  Flowsheets (Taken 1/27/2025 1137)  Verbalizes/displays adequate comfort level or baseline comfort level:   Encourage patient to monitor pain and request assistance   Assess pain using appropriate pain scale   Administer analgesics based on type and severity of pain and evaluate response   Implement non-pharmacological measures as appropriate and evaluate response

## 2025-01-27 NOTE — PLAN OF CARE
Problem: Safety - Adult  Goal: Free from fall injury  1/26/2025 2019 by Phyllis Acharya, RN  Outcome: Progressing  1/26/2025 0814 by Annie Burr, RN  Outcome: Progressing     Problem: Skin/Tissue Integrity  Goal: Skin integrity remains intact  Description: 1.  Monitor for areas of redness and/or skin breakdown  2.  Assess vascular access sites hourly  3.  Every 4-6 hours minimum:  Change oxygen saturation probe site  4.  Every 4-6 hours:  If on nasal continuous positive airway pressure, respiratory therapy assess nares and determine need for appliance change or resting period  1/26/2025 2019 by Phyllis Acharya, RN  Outcome: Progressing  1/26/2025 0814 by Annie Burr, RN  Outcome: Progressing

## 2025-01-27 NOTE — CARE COORDINATION
TATE spoke with Alondra in admissions at [facility] who reported patient only has 3 more days fully covered by insurance prior to moving into co-pay days. Alondra reports the daily copay is around $209.       PAYAL Castro, TATE  [phone]

## 2025-01-27 NOTE — PROGRESS NOTES
Speech Pathology Note    Modified Barium Swallow Study (MBS) complete. Full note to follow. No aspiration visualized with any consistency trialed. Recommend Minced&Moist/Thin Liquid diet with 1:1 assistance/supervision with meals and general aspiration precautions (i.e. upright for all PO intake, small bites/sips, single sips 1 at a time, alternate liquids/solids as needed, etc.). Recommend medication whole in puree (applesauce, pudding, etc.) acutely. Thank you.     Gregorio Bray M.S., CCC-SLP

## 2025-01-27 NOTE — PROGRESS NOTES
Hospitalist Progress Note    NAME:   Yogi Lynne .   : 1955   MRN: [id number]     Date/Time: 2025 1:22 PM  Patient PCP: Lino Regan DO    Estimated discharge date: ? , back to Nursing home [facility] care ?LTC  Barriers: Clinical improvement, ?Hospice considerations if not improved with resp status      Assessment / Plan:    Severe sepsis POA- resolved  Acute hypoxic respiratory failure POA- improved, was on 15 L high flow oxygen, now down to 3 L/m again today and stable  Respiratory distress with increased WOB  Aspiration pneumonia  Hypokalemia - 2.9 today  -CXR Diffuse interstitial infiltrate. Correlate for interstitial edema versus atypical infection.  -CTA of chest: No PE.  No airspace disease.  -paired Blood cultures  -Influenza screen negative  -SARS-CoV-2 PCR negative  -Patient reportedly was vomiting at the nursing facility.  Despite the negative imaging, he is congested on exam consistent with clinical aspiration.    Cont IV Zosyn and vancomycin  -Continue Oxygen- taper as able-stable on 3 L/m now  Cont step down bed monitoring- bradycardia today in 40s per pt- asymptomatic  Keep NPO per SLP  SLP eval noted- plan for MBS today cont nutrition, meds via PEG only  Cont IV fluids with dextrose while NPO  Replenish K+ today     Recent right MCA stroke, 2024  Admitted 24 to [facility] after being found down on the ground by neighbors.  He was diagnosed with a large right MCA territory stroke, distal right M2 segment occlusion on MRA.  Thrombectomy was not pursued due to amount of core infarction and prolonged downtime.  Patient was also diagnosed with right upper lobe pneumonia, right middle lobe nodule 5 mm.  Patient was treated with Zosyn and discharged to nursing home on aspirin 81, Lipitor 80.      Parkinson's disease  Hyperlipidemia  Hypertension  Alcohol use disorder  Postpolio muscular atrophy  -Restart aspirin and Lipitor  -Hold PTA metoprolol and losartan.  IV hydralazine as

## 2025-01-28 LAB
ANION GAP SERPL CALC-SCNC: 6 MMOL/L (ref 2–12)
BACTERIA SPEC CULT: NORMAL
BACTERIA SPEC CULT: NORMAL
BASOPHILS # BLD: 0.04 K/UL (ref 0–0.1)
BASOPHILS NFR BLD: 0.5 % (ref 0–1)
BUN SERPL-MCNC: 5 MG/DL (ref 6–20)
BUN/CREAT SERPL: 10 (ref 12–20)
CALCIUM SERPL-MCNC: 7.8 MG/DL (ref 8.5–10.1)
CHLORIDE SERPL-SCNC: 115 MMOL/L (ref 97–108)
CO2 SERPL-SCNC: 20 MMOL/L (ref 21–32)
CREAT SERPL-MCNC: 0.51 MG/DL (ref 0.7–1.3)
DIFFERENTIAL METHOD BLD: ABNORMAL
EOSINOPHIL # BLD: 0.26 K/UL (ref 0–0.4)
EOSINOPHIL NFR BLD: 3.1 % (ref 0–7)
ERYTHROCYTE [DISTWIDTH] IN BLOOD BY AUTOMATED COUNT: 13.4 % (ref 11.5–14.5)
GLUCOSE SERPL-MCNC: 98 MG/DL (ref 65–100)
HCT VFR BLD AUTO: 32.2 % (ref 36.6–50.3)
HGB BLD-MCNC: 10.8 G/DL (ref 12.1–17)
IMM GRANULOCYTES # BLD AUTO: 0.06 K/UL (ref 0–0.04)
IMM GRANULOCYTES NFR BLD AUTO: 0.7 % (ref 0–0.5)
LYMPHOCYTES # BLD: 2.26 K/UL (ref 0.8–3.5)
LYMPHOCYTES NFR BLD: 27.2 % (ref 12–49)
MCH RBC QN AUTO: 31.2 PG (ref 26–34)
MCHC RBC AUTO-ENTMCNC: 33.5 G/DL (ref 30–36.5)
MCV RBC AUTO: 93.1 FL (ref 80–99)
MONOCYTES # BLD: 0.59 K/UL (ref 0–1)
MONOCYTES NFR BLD: 7.1 % (ref 5–13)
NEUTS SEG # BLD: 5.11 K/UL (ref 1.8–8)
NEUTS SEG NFR BLD: 61.4 % (ref 32–75)
NRBC # BLD: 0 K/UL (ref 0–0.01)
NRBC BLD-RTO: 0 PER 100 WBC
PLATELET # BLD AUTO: 251 K/UL (ref 150–400)
PMV BLD AUTO: 10.5 FL (ref 8.9–12.9)
POTASSIUM SERPL-SCNC: 3 MMOL/L (ref 3.5–5.1)
RBC # BLD AUTO: 3.46 M/UL (ref 4.1–5.7)
SERVICE CMNT-IMP: NORMAL
SERVICE CMNT-IMP: NORMAL
SODIUM SERPL-SCNC: 141 MMOL/L (ref 136–145)
WBC # BLD AUTO: 8.3 K/UL (ref 4.1–11.1)

## 2025-01-28 PROCEDURE — 36415 COLL VENOUS BLD VENIPUNCTURE: CPT

## 2025-01-28 PROCEDURE — 85025 COMPLETE CBC W/AUTO DIFF WBC: CPT

## 2025-01-28 PROCEDURE — 6360000002 HC RX W HCPCS: Performed by: INTERNAL MEDICINE

## 2025-01-28 PROCEDURE — 2060000000 HC ICU INTERMEDIATE R&B

## 2025-01-28 PROCEDURE — 92526 ORAL FUNCTION THERAPY: CPT

## 2025-01-28 PROCEDURE — 2500000003 HC RX 250 WO HCPCS: Performed by: INTERNAL MEDICINE

## 2025-01-28 PROCEDURE — 80048 BASIC METABOLIC PNL TOTAL CA: CPT

## 2025-01-28 PROCEDURE — 92523 SPEECH SOUND LANG COMPREHEN: CPT

## 2025-01-28 PROCEDURE — 2580000003 HC RX 258: Performed by: INTERNAL MEDICINE

## 2025-01-28 PROCEDURE — 6370000000 HC RX 637 (ALT 250 FOR IP): Performed by: INTERNAL MEDICINE

## 2025-01-28 RX ORDER — CASTOR OIL AND BALSAM, PERU 788; 87 MG/G; MG/G
OINTMENT TOPICAL 2 TIMES DAILY
Status: DISCONTINUED | OUTPATIENT
Start: 2025-01-28 | End: 2025-01-30 | Stop reason: HOSPADM

## 2025-01-28 RX ADMIN — SODIUM CHLORIDE, PRESERVATIVE FREE 10 ML: 5 INJECTION INTRAVENOUS at 21:00

## 2025-01-28 RX ADMIN — Medication: at 08:19

## 2025-01-28 RX ADMIN — ENOXAPARIN SODIUM 40 MG: 100 INJECTION SUBCUTANEOUS at 08:19

## 2025-01-28 RX ADMIN — CARBIDOPA AND LEVODOPA 1 TABLET: 25; 100 TABLET ORAL at 21:00

## 2025-01-28 RX ADMIN — PIPERACILLIN AND TAZOBACTAM 3375 MG: 3; .375 INJECTION, POWDER, LYOPHILIZED, FOR SOLUTION INTRAVENOUS at 16:48

## 2025-01-28 RX ADMIN — PIPERACILLIN AND TAZOBACTAM 3375 MG: 3; .375 INJECTION, POWDER, LYOPHILIZED, FOR SOLUTION INTRAVENOUS at 00:00

## 2025-01-28 RX ADMIN — CARBIDOPA AND LEVODOPA 1 TABLET: 25; 100 TABLET ORAL at 14:24

## 2025-01-28 RX ADMIN — PIPERACILLIN AND TAZOBACTAM 3375 MG: 3; .375 INJECTION, POWDER, LYOPHILIZED, FOR SOLUTION INTRAVENOUS at 23:48

## 2025-01-28 RX ADMIN — SODIUM CHLORIDE, PRESERVATIVE FREE 10 ML: 5 INJECTION INTRAVENOUS at 08:19

## 2025-01-28 RX ADMIN — ATORVASTATIN CALCIUM 40 MG: 40 TABLET, FILM COATED ORAL at 21:00

## 2025-01-28 RX ADMIN — POTASSIUM BICARBONATE 40 MEQ: 782 TABLET, EFFERVESCENT ORAL at 11:46

## 2025-01-28 RX ADMIN — Medication: at 21:01

## 2025-01-28 RX ADMIN — Medication: at 14:26

## 2025-01-28 RX ADMIN — DEXTROSE AND SODIUM CHLORIDE: 5; 900 INJECTION, SOLUTION INTRAVENOUS at 03:50

## 2025-01-28 RX ADMIN — PIPERACILLIN AND TAZOBACTAM 3375 MG: 3; .375 INJECTION, POWDER, LYOPHILIZED, FOR SOLUTION INTRAVENOUS at 08:22

## 2025-01-28 RX ADMIN — CARBIDOPA AND LEVODOPA 1 TABLET: 25; 100 TABLET ORAL at 08:19

## 2025-01-28 RX ADMIN — Medication: at 14:25

## 2025-01-28 RX ADMIN — ASPIRIN 81 MG: 81 TABLET, CHEWABLE ORAL at 08:19

## 2025-01-28 ASSESSMENT — PAIN SCALES - GENERAL
PAINLEVEL_OUTOF10: 0
PAINLEVEL_OUTOF10: 0

## 2025-01-28 NOTE — PLAN OF CARE
Problem: Respiratory - Adult  Goal: Achieves optimal ventilation and oxygenation  Outcome: Progressing  Flowsheets (Taken 1/28/2025 1124)  Achieves optimal ventilation and oxygenation: Assess for changes in respiratory status     Problem: Safety - Adult  Goal: Free from fall injury  1/28/2025 1124 by Zena Encarnacion RN  Outcome: Progressing  1/27/2025 2341 by Phyllis Acharya RN  Outcome: Progressing     Problem: Pain  Goal: Verbalizes/displays adequate comfort level or baseline comfort level  1/28/2025 1124 by Zena Encarnacion RN  Outcome: Progressing  Flowsheets (Taken 1/28/2025 1124)  Verbalizes/displays adequate comfort level or baseline comfort level:   Encourage patient to monitor pain and request assistance   Assess pain using appropriate pain scale   Administer analgesics based on type and severity of pain and evaluate response  1/27/2025 2341 by Phyllis Acharya RN  Outcome: Progressing     Problem: Skin/Tissue Integrity  Goal: Skin integrity remains intact  Description: 1.  Monitor for areas of redness and/or skin breakdown  2.  Assess vascular access sites hourly  3.  Every 4-6 hours minimum:  Change oxygen saturation probe site  4.  Every 4-6 hours:  If on nasal continuous positive airway pressure, respiratory therapy assess nares and determine need for appliance change or resting period  Outcome: Progressing  Flowsheets (Taken 1/28/2025 1124)  Skin Integrity Remains Intact: Monitor for areas of redness and/or skin breakdown

## 2025-01-28 NOTE — PROGRESS NOTES
End of Shift Note    Bedside shift change report given to Edie (oncoming nurse) by Zena Encarnacion RN (offgoing nurse).  Report included the following information SBAR    Shift worked:  7 am- 7 pm     Shift summary and any significant changes:     Pt vanc. Was discontinued  Appetite continues to be poor, pt at goal rate for tube feeing      Concerns for physician to address:  [phone]     [facility] phone for oncoming shift:   -       Activity:  Level of Assistance: Dependent, patient does less than 25%  Number times ambulated in hallways past shift: 0  Number of times OOB to chair past shift: 0    Cardiac:   Cardiac Monitoring: Yes      Cardiac Rhythm: Sinus rhythm    Access:  Current line(s): PIV    Genitourinary:   Urinary Status: Voiding    Respiratory:   O2 Device: None (Room air)  Chronic home O2 use?: NO  Incentive spirometer at bedside: NO    GI:  Last BM (including prior to admit): 01/27/25  Current diet:  ADULT DIET; Dysphagia - Minced and Moist  ADULT TUBE FEEDING; PEG; Standard with Fiber; Continuous; 20; Yes; 10; Q 4 hours; 60; 180; Q 4 hours  DIET ONE TIME MESSAGE;  Passing flatus: YES    Pain Management:   Patient states pain is manageable on current regimen: YES    Skin:  Conrado Scale Score: 14  Interventions: Wound Offloading (Prevention Methods): Bed, pressure redistribution/air    Patient Safety:  Fall Risk: Nursing Judgement-Fall Risk High(Add Comments): Yes  Fall Risk Interventions  Nursing Judgement-Fall Risk High(Add Comments): Yes  Toilet Every 2 Hours-In Advance of Need: Yes  Hourly Visual Checks: In bed  Fall Visual Posted: Fall sign posted  Room Door Open: Deferred to decrease stimulation  Alarm On: Bed  Patient Moved Closer to Nursing Station: No    Active Consults:   IP CONSULT TO PHARMACY    Length of Stay:  Expected LOS: 5  Actual LOS: 4    Zena Encarnacion RN
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
## 2025-01-28 NOTE — PROGRESS NOTES
Occupational Therapy    OT referral received, chart reviewed and interventions attempted. Wound care getting ready to enter pt's room, therefore OT deferred. Will continue to follow.

## 2025-01-28 NOTE — PLAN OF CARE
Speech LAnguage Pathology TREATMENT    Patient: Yogi Lynne . (70 y.o. male)  Date: 1/28/2025  Primary Diagnosis: Acute hypoxemic respiratory failure [J96.01]  Acute hypoxic respiratory failure [J96.01]       Precautions:                     ASSESSMENT :  Patient continues to be followed by SLP acutely. MBS completed yesterday revealed significant improvement in swallow function since prior record of objective imaging completed last month following R MCA stroke. RN reported good tolerance of current diet, however patient with limited PO intake overall. RN repositioned patient prior to dysphagia treatment with SLP. Note patient observed with congested cough prior to PO intake. Intermittent cough observed following PO trials, though not consistently. Note MBS did not reveal aspiration of any consistency and patient with no reflexive cough/throat clear in response to penetration. Patient benefited from cueing for small, single sips 1 at a time as well as the provision of a liquid wash to assist with oral cavity clearance. Believe patient would be a good candidate for EMST given objectively assessed mildly reduced hyolaryngeal excursion and subjectively assessed weak cough strength. Trialed inhale-exhale coordination with tissue on this date, in which patient benefited from a clinician model and moderate verbal cues to complete. Will continue to assess for appropriateness of EMST evaluation.    Orientation Log (O-Log) completed with patient on this date, with patient scoring 13/30. Note a score of 25 or better is associated with normal orientation. Note patient with Parkinson's Disease as well as recent R MCA stroke (last month). Unclear of patient's baseline cognitive status. Suspect will benefit from delirium precautions and continued cognitive diagnostic rehab acutely. SLP to continue to follow.    Patient will benefit from skilled intervention to address the above impairments.     PLAN :  Recommendations and

## 2025-01-28 NOTE — PLAN OF CARE
Problem: SLP Adult - Impaired Swallowing  Goal: By Discharge: Advance to least restrictive diet without signs or symptoms of aspiration for planned discharge setting.  See evaluation for individualized goals.  Description: Speech Pathology Goals  Initiated 1/25/2025     1. Patient will participate in MBS to determine least restrictive diet acutely within 7 days. MET 1/27/2025  2. Patient will tolerate least restrictive diet without signs of aspiration or decline in respiratory status within 7 days.    1/27/2025 1544 by Gregorio Bray, SLP  Outcome: Progressing     Problem: Safety - Adult  Goal: Free from fall injury  1/27/2025 2341 by Phyllis Acharya, RN  Outcome: Progressing  1/27/2025 1137 by Zena Encarnacion RN  Outcome: Progressing  Flowsheets (Taken 1/27/2025 1137)  Free From Fall Injury: Instruct family/caregiver on patient safety     Problem: Pain  Goal: Verbalizes/displays adequate comfort level or baseline comfort level  1/27/2025 2341 by Phyllis Acharya RN  Outcome: Progressing  1/27/2025 1137 by Zena Encarnacion RN  Outcome: Progressing  Flowsheets (Taken 1/27/2025 1137)  Verbalizes/displays adequate comfort level or baseline comfort level:   Encourage patient to monitor pain and request assistance   Assess pain using appropriate pain scale   Administer analgesics based on type and severity of pain and evaluate response   Implement non-pharmacological measures as appropriate and evaluate response

## 2025-01-28 NOTE — PROGRESS NOTES
End of Shift Note    Bedside shift change report given to RN (oncoming nurse) by Phyllis Acharya RN (offgoing nurse).  Report included the following information SBAR and Kardex    Shift worked:  [id number]     Shift summary and any significant changes:     TF advanced to 40ml/hr.  Next rate increase due at 0800.       Concerns for physician to address:  Pt is on tube feeds, should he have Q 6 blood sugar checks?  Now that he is getting tube feeds, can IV fluids be D/C'd?       [facility] phone for oncoming shift:          Activity:  Level of Assistance: Dependent, patient does less than 25%  Number times ambulated in hallways past shift: 0  Number of times OOB to chair past shift: 0    Cardiac:   Cardiac Monitoring: Yes      Cardiac Rhythm: Sinus rhythm    Access:  Current line(s): PIV     Genitourinary:   Urinary Status: Voiding    Respiratory:   O2 Device: None (Room air)  Chronic home O2 use?: NO  Incentive spirometer at bedside: N/A    GI:  Last BM (including prior to admit): 01/27/25  Current diet:  ADULT DIET; Dysphagia - Minced and Moist  ADULT TUBE FEEDING; PEG; Standard with Fiber; Continuous; 20; Yes; 10; Q 4 hours; 60; 180; Q 4 hours  DIET ONE TIME MESSAGE;  Passing flatus: YES    Pain Management:   Patient states pain is manageable on current regimen: YES    Skin:  Conrado Scale Score: 13  Interventions: Wound Offloading (Prevention Methods): Bed, pressure redistribution/air    Patient Safety:  Fall Risk: Nursing Judgement-Fall Risk High(Add Comments): Yes  Fall Risk Interventions  Nursing Judgement-Fall Risk High(Add Comments): Yes  Toilet Every 2 Hours-In Advance of Need: Yes  Hourly Visual Checks: Eyes closed, In bed  Fall Visual Posted: Fall sign posted  Room Door Open: Deferred to decrease stimulation  Alarm On: Bed  Patient Moved Closer to Nursing Station: No    Active Consults:   IP CONSULT TO PHARMACY    Length of Stay:  Expected LOS: 4  Actual LOS: 4    Phyllis Acharya RN

## 2025-01-28 NOTE — PROGRESS NOTES
Physical Therapy  Referral received, chart reviewed and attempted to see patient for PT evaluation. Patient currently with wound care. Will defer and continue to follow.   Phi Dawkins, PT, DPT

## 2025-01-29 LAB
ANION GAP SERPL CALC-SCNC: 5 MMOL/L (ref 2–12)
BUN SERPL-MCNC: 5 MG/DL (ref 6–20)
BUN/CREAT SERPL: 7 (ref 12–20)
CALCIUM SERPL-MCNC: 8.5 MG/DL (ref 8.5–10.1)
CHLORIDE SERPL-SCNC: 111 MMOL/L (ref 97–108)
CO2 SERPL-SCNC: 25 MMOL/L (ref 21–32)
CREAT SERPL-MCNC: 0.75 MG/DL (ref 0.7–1.3)
GLUCOSE SERPL-MCNC: 97 MG/DL (ref 65–100)
POTASSIUM SERPL-SCNC: 3.2 MMOL/L (ref 3.5–5.1)
SODIUM SERPL-SCNC: 141 MMOL/L (ref 136–145)

## 2025-01-29 PROCEDURE — 92526 ORAL FUNCTION THERAPY: CPT

## 2025-01-29 PROCEDURE — 92507 TX SP LANG VOICE COMM INDIV: CPT

## 2025-01-29 PROCEDURE — 6360000002 HC RX W HCPCS: Performed by: INTERNAL MEDICINE

## 2025-01-29 PROCEDURE — 2580000003 HC RX 258: Performed by: INTERNAL MEDICINE

## 2025-01-29 PROCEDURE — 97530 THERAPEUTIC ACTIVITIES: CPT

## 2025-01-29 PROCEDURE — 36415 COLL VENOUS BLD VENIPUNCTURE: CPT

## 2025-01-29 PROCEDURE — 97535 SELF CARE MNGMENT TRAINING: CPT

## 2025-01-29 PROCEDURE — 6370000000 HC RX 637 (ALT 250 FOR IP): Performed by: INTERNAL MEDICINE

## 2025-01-29 PROCEDURE — 2060000000 HC ICU INTERMEDIATE R&B

## 2025-01-29 PROCEDURE — 2500000003 HC RX 250 WO HCPCS: Performed by: INTERNAL MEDICINE

## 2025-01-29 PROCEDURE — 97161 PT EVAL LOW COMPLEX 20 MIN: CPT

## 2025-01-29 PROCEDURE — 97165 OT EVAL LOW COMPLEX 30 MIN: CPT

## 2025-01-29 PROCEDURE — 80048 BASIC METABOLIC PNL TOTAL CA: CPT

## 2025-01-29 RX ADMIN — PIPERACILLIN AND TAZOBACTAM 3375 MG: 3; .375 INJECTION, POWDER, LYOPHILIZED, FOR SOLUTION INTRAVENOUS at 15:37

## 2025-01-29 RX ADMIN — CARBIDOPA AND LEVODOPA 1 TABLET: 25; 100 TABLET ORAL at 21:12

## 2025-01-29 RX ADMIN — ATORVASTATIN CALCIUM 40 MG: 40 TABLET, FILM COATED ORAL at 21:12

## 2025-01-29 RX ADMIN — Medication: at 09:33

## 2025-01-29 RX ADMIN — Medication: at 21:26

## 2025-01-29 RX ADMIN — PIPERACILLIN AND TAZOBACTAM 3375 MG: 3; .375 INJECTION, POWDER, LYOPHILIZED, FOR SOLUTION INTRAVENOUS at 09:38

## 2025-01-29 RX ADMIN — Medication: at 21:28

## 2025-01-29 RX ADMIN — SODIUM CHLORIDE, PRESERVATIVE FREE 10 ML: 5 INJECTION INTRAVENOUS at 21:29

## 2025-01-29 RX ADMIN — ENOXAPARIN SODIUM 40 MG: 100 INJECTION SUBCUTANEOUS at 09:31

## 2025-01-29 RX ADMIN — ASPIRIN 81 MG: 81 TABLET, CHEWABLE ORAL at 09:31

## 2025-01-29 RX ADMIN — CARBIDOPA AND LEVODOPA 1 TABLET: 25; 100 TABLET ORAL at 15:28

## 2025-01-29 RX ADMIN — SODIUM CHLORIDE, PRESERVATIVE FREE 10 ML: 5 INJECTION INTRAVENOUS at 09:32

## 2025-01-29 RX ADMIN — CARBIDOPA AND LEVODOPA 1 TABLET: 25; 100 TABLET ORAL at 09:31

## 2025-01-29 RX ADMIN — POTASSIUM BICARBONATE 40 MEQ: 782 TABLET, EFFERVESCENT ORAL at 12:04

## 2025-01-29 ASSESSMENT — PAIN SCALES - GENERAL
PAINLEVEL_OUTOF10: 0

## 2025-01-29 NOTE — PROGRESS NOTES
Hospitalist Progress Note    NAME:   Yogi Lynne .   : 1955   MRN: [id number]     Date/Time: 2025 9:33 AM  Patient PCP: Lino Regan DO    Estimated discharge date: ? , back to [facility] home [facility] care ?LTC  Barriers: Clinical improvement, Tube feeding at goal per nutrition recc, ?Hospice considerations if not improved with resp status      Assessment / Plan:    Severe sepsis POA- resolved  Acute hypoxic respiratory failure POA- improved, was on 15 L high flow oxygen, now down to RA per RN  Respiratory distress with increased WOB  Aspiration pneumonia  Hypokalemia - 3.0 today  -CXR Diffuse interstitial infiltrate. Correlate for interstitial edema versus atypical infection.  -CTA of chest: No PE.  No airspace disease.  -paired Blood cultures  -Influenza screen negative  -SARS-CoV-2 PCR negative  -Patient reportedly was vomiting at the nursing facility.  Despite the negative imaging, he is congested on exam consistent with clinical aspiration.    Cont IV Zosyn and vancomycin  -Continue Oxygen- tapered off to RA now  Cont step down bed monitoring- bradycardia today in 40s per pt- asymptomatic  Keep NPO per SLP  SLP eval noted-s/p MBS Monday, cont meds via PEG only + TF recommended as pt not at goal from caloric needs just by PO diet- Po diet per SLP recc now  DC IV fluids today  Replenish K+ via PEG again today     Recent right MCA stroke, 2024  Admitted 24 to [facility] after being found down on the ground by neighbors.  He was diagnosed with a large right MCA territory stroke, distal right M2 segment occlusion on MRA.  Thrombectomy was not pursued due to amount of core infarction and prolonged downtime.  Patient was also diagnosed with right upper lobe pneumonia, right middle lobe nodule 5 mm.  Patient was treated with Zosyn and discharged to nursing home on aspirin 81, Lipitor 80.      Parkinson's disease  Hyperlipidemia  Hypertension  Alcohol use disorder  Postpolio muscular

## 2025-01-29 NOTE — PLAN OF CARE
Problem: Respiratory - Adult  Goal: Achieves optimal ventilation and oxygenation  1/28/2025 1948 by Edie You RN  Outcome: Progressing  1/28/2025 1124 by Zena Encarnacion RN  Outcome: Progressing  Flowsheets (Taken 1/28/2025 1124)  Achieves optimal ventilation and oxygenation: Assess for changes in respiratory status     Problem: SLP Adult - Impaired Swallowing  Goal: By Discharge: Advance to least restrictive diet without signs or symptoms of aspiration for planned discharge setting.  See evaluation for individualized goals.  Description: Speech Pathology Goals  Initiated 1/25/2025     1. Patient will participate in MBS to determine least restrictive diet acutely within 7 days. MET 1/27/2025  2. Patient will tolerate least restrictive diet without signs of aspiration or decline in respiratory status within 7 days.  Added 1/28/2025: 3. Patient will participate in continued cognitive diagnostic treatment within 7 days given recent R MCA stroke.    1/28/2025 1354 by Gregorio Bray, SLP  Outcome: Progressing     Problem: Safety - Adult  Goal: Free from fall injury  1/28/2025 1948 by Edie You RN  Outcome: Progressing  1/28/2025 1124 by Zena Encarnacion RN  Outcome: Progressing     Problem: Discharge Planning  Goal: Discharge to home or other facility with appropriate resources  Outcome: Progressing     Problem: Pain  Goal: Verbalizes/displays adequate comfort level or baseline comfort level  1/28/2025 1124 by Zena Encarnacion RN  Outcome: Progressing  Flowsheets (Taken 1/28/2025 1124)  Verbalizes/displays adequate comfort level or baseline comfort level:   Encourage patient to monitor pain and request assistance   Assess pain using appropriate pain scale   Administer analgesics based on type and severity of pain and evaluate response     Problem: Skin/Tissue Integrity  Goal: Skin integrity remains intact  Description: 1.  Monitor for areas of redness and/or skin breakdown  2.  Assess

## 2025-01-29 NOTE — PLAN OF CARE
Problem: Occupational Therapy - Adult  Goal: By Discharge: Performs self-care activities at highest level of function for planned discharge setting.  See evaluation for individualized goals.  Description: FUNCTIONAL STATUS PRIOR TO ADMISSION:  Pt has been at SNF since Dec 2024 post R MCA CVA. Pt unable to state how he was participating in therapy.   ,  ,  ,  ,  ,  ,  ,  ,  ,  , Active : No     HOME SUPPORT: Patient admitted from SNF.    Occupational Therapy Goals:  Initiated 1/29/2025  1.  Patient will perform self-feeding with Minimal Assist within 7 day(s).  2.  Patient will perform grooming with Minimal Assist within 7 day(s).  3.  Patient will perform upper body dressing with Moderate Assist within 7 day(s).  4.  Patient will perform rolling R<>L with Moderate Assist  within 7 day(s).  5.  Patient will perform toilet transfer with Maximal Assist within 7 day(s).    Outcome: Not Progressing   OCCUPATIONAL THERAPY EVALUATION    Patient: Yogi Lynne  (70 y.o. male)  Date: 1/29/2025  Primary Diagnosis: Acute hypoxemic respiratory failure [J96.01]  Acute hypoxic respiratory failure [J96.01]         Precautions:                    ASSESSMENT :  The patient is limited by decreased functional mobility, independence in ADLs, ROM, strength, sensation, activity tolerance, endurance, safety awareness, cognition, command following, attention/concentration, coordination, balance, posture, fine-motor control, increased pain levels. Pt admitted for acute hypoxic respiratory failure. PMHx significant for recent R MCA CVA 12/2024, with L hemiplegia, has been at SNF since last admission.    Based on the impairments listed above pt currently requires up to max assist for functional mobility and SBA-total assist for ADLs. Pt tolerated evaluation fair. Presents with non-functional LUE hemiparesis, diminished LUE sensation with pain, impaired cognition, including delayed processing, disorientation, decreased insight,

## 2025-01-29 NOTE — PROGRESS NOTES
Hospitalist Progress Note    NAME:   Yogi Lynne .   : 1955   MRN: [id number]     Date/Time: 2025 9:39 AM  Patient PCP: Lino Regan DO    Estimated discharge date: ?-31 , back to [facility] home [facility] care ?LTC  Barriers: Clinical improvement, Tube feeding at goal per nutrition recc, ?Hospice considerations if not improved with resp status      Assessment / Plan:    Severe sepsis POA- resolved  Acute hypoxic respiratory failure POA- improved, was on 15 L high flow oxygen, now down to RA and stable  Respiratory distress with increased WOB  Aspiration pneumonia  Hypokalemia - 3.2 today  -CXR Diffuse interstitial infiltrate. Correlate for interstitial edema versus atypical infection.  -CTA of chest: No PE.  No airspace disease.  -paired Blood cultures  -Influenza screen negative  -SARS-CoV-2 PCR negative  -Patient reportedly was vomiting at the nursing facility.  Despite the negative imaging, he is congested on exam consistent with clinical aspiration.    Cont IV Zosyn and vancomycin  -Continue Oxygen- tapered off to RA now  Cont step down bed monitoring- bradycardia resolved now  Keep NPO per SLP  SLP eval noted-s/p MBS Monday, cont meds via PEG only + TF recommended as pt not at goal from caloric needs just by PO diet- PO Dysphagia diet per SLP recc now  Off IV fluids now  Replenish K+ via PEG again today     Recent right MCA stroke, 2024  Admitted 24 to [facility] after being found down on the ground by neighbors.  He was diagnosed with a large right MCA territory stroke, distal right M2 segment occlusion on MRA.  Thrombectomy was not pursued due to amount of core infarction and prolonged downtime.  Patient was also diagnosed with right upper lobe pneumonia, right middle lobe nodule 5 mm.  Patient was treated with Zosyn and discharged to nursing home on aspirin 81, Lipitor 80.      Parkinson's disease  Hyperlipidemia  Hypertension  Alcohol use disorder  Postpolio muscular atrophy  Stage  3 pressure injury to left sacrum/buttock  POA  Stage 2 to right sacrum/buttocks POA  Left medial foot DTPI POA  -Restart aspirin and Lipitor  -Hold PTA metoprolol and losartan.  IV hydralazine as needed  -Restart Sinemet 25/100 3 times daily  Cont wound care- appreciated  PT/Ot eval for DC planning    Discussed case with: Pt, RN, SLP  Code Status:  DNR confirmed with brother violet # [phone]     Surrogate Decision Maker:  Brother Rai # [phone]         DVT Prophylaxis: Lovenox     Baseline: Recent discharge from [facility].  Currently at nursing facility    Subjective:     Chief Complaint / Reason for Physician Visit:  F/U for Aspiration PNA, dysphagia, s/p PEG, bradycardia  \"I am better\".  Discussed with RN events overnight.     Pt/Ot ana for DC planning    Objective:     VITALS:   Last 24hrs VS reviewed since prior progress note. Most recent are:  Patient Vitals for the past 24 hrs:   BP Temp Temp src Pulse Resp SpO2   01/29/25 0745 (!) 148/59 97.5 °F (36.4 °C) Oral 74 27 96 %   01/29/25 0205 (!) 151/60 98.1 °F (36.7 °C) Oral 74 27 96 %   01/28/25 2250 (!) 171/56 98.5 °F (36.9 °C) Oral 73 (!) 31 95 %   01/28/25 1944 -- -- -- -- -- 97 %   01/28/25 1935 (!) 164/58 98.2 °F (36.8 °C) Oral 70 19 --   01/28/25 1524 (!) 127/41 98.7 °F (37.1 °C) Oral 61 18 --   01/28/25 1058 (!) 133/49 98.9 °F (37.2 °C) Oral 75 16 --         Intake/Output Summary (Last 24 hours) at 1/29/2025 0939  Last data filed at 1/29/2025 0207  Gross per 24 hour   Intake 1739.97 ml   Output 2550 ml   Net -810.03 ml        I had a face to face encounter and independently examined this patient on 1/29/2025, as outlined below:  PHYSICAL EXAM:  General: Alert, cooperative  EENT:  EOMI. Anicteric sclerae.  Resp:  CTA bilaterally, no wheezing or rales.  No accessory muscle use  CV:  Regular  rhythm,  No edema  GI:  Soft, Non distended, Non tender.  +Bowel sounds  Neurologic:  Alert and oriented X 3, normal speech,   Psych:   Good insight. Not anxious nor

## 2025-01-29 NOTE — PLAN OF CARE
including recommendations, planned interventions, and recommended diet changes were discussed with: Registered nurse    Patient/family have participated as able in goal setting and plan of care and Patient/family agree to work toward stated goals and plan of care    Thank you,  Gregorio Bray, SLP    SLP Individual Minutes  Time In: 0945  Time Out: 1005  Minutes: 20

## 2025-01-29 NOTE — PLAN OF CARE
Problem: Physical Therapy - Adult  Goal: By Discharge: Performs mobility at highest level of function for planned discharge setting.  See evaluation for individualized goals.  Description: FUNCTIONAL STATUS PRIOR TO ADMISSION: Per chart, patient was independent with wheelchair prior to having a stroke in December 2024 in R MCA territory. Patient has since been at SNF. Patient rather poor historian of recent events and unable to describe functional mobility status. A PT note on 1/7/25 at [facility] indicates Max to Total A x 2 for functional mobility.    HOME SUPPORT PRIOR TO ADMISSION: Lived alone with limited social support.    Physical Therapy Goals  Initiated 1/29/2025  1.  Patient will roll side to side in bed with minimal assistance within 7 day(s).    2.  Patient will move from supine to sit and sit to supine in bed with moderate assistance within 7 day(s).    3.  Patient will perform sit to stand with maximal assistance within 7 day(s).  4.  Patient will transfer from bed to chair and chair to bed with maximal assistance using the least restrictive device within 7 day(s).    Outcome: Not Progressing     PHYSICAL THERAPY EVALUATION    Patient: Yogi CRUZ Maged Roberts (70 y.o. male)  Date: 1/29/2025  Primary Diagnosis: Acute hypoxemic respiratory failure [J96.01]  Acute hypoxic respiratory failure [J96.01]       Precautions: Activity Level: Up with Assist                      ASSESSMENT :   DEFICITS/IMPAIRMENTS:   The patient is limited by decreased functional mobility, independence in ADLs, ROM, strength, body mechanics, activity tolerance, safety awareness, cognition, command following, coordination, balance, increased pain levels s/p admission from SNF for acute hypoxemic respiratory failure requiring supplemental O2. Seen for PT evaluation on RA. Patient with PEG tube and sacral wounds. H/o Parkinson's Disease, EtOH, CVA, bowel/bladder incontinence, and polio.    Based on the impairments listed above, patient

## 2025-01-30 VITALS
SYSTOLIC BLOOD PRESSURE: 158 MMHG | OXYGEN SATURATION: 98 % | BODY MASS INDEX: 25.99 KG/M2 | HEART RATE: 84 BPM | TEMPERATURE: 98.3 F | DIASTOLIC BLOOD PRESSURE: 61 MMHG | WEIGHT: 185.63 LBS | RESPIRATION RATE: 24 BRPM | HEIGHT: 71 IN

## 2025-01-30 LAB
ANION GAP SERPL CALC-SCNC: 6 MMOL/L (ref 2–12)
BUN SERPL-MCNC: 6 MG/DL (ref 6–20)
BUN/CREAT SERPL: 8 (ref 12–20)
CALCIUM SERPL-MCNC: 8.6 MG/DL (ref 8.5–10.1)
CHLORIDE SERPL-SCNC: 107 MMOL/L (ref 97–108)
CO2 SERPL-SCNC: 26 MMOL/L (ref 21–32)
CREAT SERPL-MCNC: 0.73 MG/DL (ref 0.7–1.3)
GLUCOSE SERPL-MCNC: 118 MG/DL (ref 65–100)
POTASSIUM SERPL-SCNC: 3.4 MMOL/L (ref 3.5–5.1)
SODIUM SERPL-SCNC: 139 MMOL/L (ref 136–145)

## 2025-01-30 PROCEDURE — 2580000003 HC RX 258: Performed by: INTERNAL MEDICINE

## 2025-01-30 PROCEDURE — 36415 COLL VENOUS BLD VENIPUNCTURE: CPT

## 2025-01-30 PROCEDURE — 80048 BASIC METABOLIC PNL TOTAL CA: CPT

## 2025-01-30 PROCEDURE — 6370000000 HC RX 637 (ALT 250 FOR IP): Performed by: INTERNAL MEDICINE

## 2025-01-30 PROCEDURE — 2500000003 HC RX 250 WO HCPCS: Performed by: INTERNAL MEDICINE

## 2025-01-30 PROCEDURE — 6360000002 HC RX W HCPCS: Performed by: INTERNAL MEDICINE

## 2025-01-30 RX ORDER — METRONIDAZOLE 500 MG/1
500 TABLET ORAL 3 TIMES DAILY
Qty: 15 TABLET | Refills: 0 | Status: ON HOLD
Start: 2025-01-30 | End: 2025-02-04

## 2025-01-30 RX ADMIN — SODIUM CHLORIDE, PRESERVATIVE FREE 10 ML: 5 INJECTION INTRAVENOUS at 09:24

## 2025-01-30 RX ADMIN — Medication: at 09:25

## 2025-01-30 RX ADMIN — CARBIDOPA AND LEVODOPA 1 TABLET: 25; 100 TABLET ORAL at 09:24

## 2025-01-30 RX ADMIN — ENOXAPARIN SODIUM 40 MG: 100 INJECTION SUBCUTANEOUS at 09:24

## 2025-01-30 RX ADMIN — ASPIRIN 81 MG: 81 TABLET, CHEWABLE ORAL at 09:24

## 2025-01-30 RX ADMIN — PIPERACILLIN AND TAZOBACTAM 3375 MG: 3; .375 INJECTION, POWDER, LYOPHILIZED, FOR SOLUTION INTRAVENOUS at 00:10

## 2025-01-30 ASSESSMENT — PAIN SCALES - GENERAL
PAINLEVEL_OUTOF10: 0
PAINLEVEL_OUTOF10: 0

## 2025-01-30 NOTE — PLAN OF CARE
Problem: Respiratory - Adult  Goal: Achieves optimal ventilation and oxygenation  Outcome: Completed  Flowsheets (Taken 1/30/2025 0734)  Achieves optimal ventilation and oxygenation: Assess for changes in respiratory status     Problem: Safety - Adult  Goal: Free from fall injury  Outcome: Completed     Problem: Discharge Planning  Goal: Discharge to home or other facility with appropriate resources  Outcome: Completed  Flowsheets (Taken 1/30/2025 0734)  Discharge to home or other facility with appropriate resources: Arrange for needed discharge resources and transportation as appropriate     Problem: Pain  Goal: Verbalizes/displays adequate comfort level or baseline comfort level  Outcome: Completed  Flowsheets  Taken 1/30/2025 1130  Verbalizes/displays adequate comfort level or baseline comfort level: Encourage patient to monitor pain and request assistance  Taken 1/30/2025 0734  Verbalizes/displays adequate comfort level or baseline comfort level: Encourage patient to monitor pain and request assistance     Problem: Skin/Tissue Integrity  Goal: Skin integrity remains intact  Description: 1.  Monitor for areas of redness and/or skin breakdown  2.  Assess vascular access sites hourly  3.  Every 4-6 hours minimum:  Change oxygen saturation probe site  4.  Every 4-6 hours:  If on nasal continuous positive airway pressure, respiratory therapy assess nares and determine need for appliance change or resting period  Outcome: Completed  Flowsheets (Taken 1/30/2025 0734)  Skin Integrity Remains Intact: Monitor for areas of redness and/or skin breakdown     Problem: ABCDS Injury Assessment  Goal: Absence of physical injury  Outcome: Completed     Problem: Nutrition Deficit:  Goal: Optimize nutritional status  Outcome: Completed

## 2025-01-30 NOTE — CARE COORDINATION
Chart reviewed. Patient discussed in rounds. Can return to [facility], if they can accept back.    CM called [facility] and message left for admissions.     CM called  Yady Guevaraer, Phone: [phone]-the patient's brother is his MPOA . To discuss discharge today. Informed that patient would have a copay after 3 days. Brother states he would like patient to return to [facility]. States he will call the facility to discuss finances/cost. Patient unable to sign IM letter, but brother confirmed willingness for patient to return today.     1200 CM received confirmation from [facility] admissions that patient can return and bed available.     Transition of Care Plan to SNF/Rehab    Communication to Patient/Family:  Met with patient and family and they are agreeable to the transition plan. The Plan for Transition of Care is related to the following treatment goals: rehab continuation    The Patient and/or patient representative was provided with a choice of provider and agrees  with the discharge plan.      Yes [] No [x] [facility] had already been chosen    A Freedom of choice list was provided with basic dialogue that supports the patient's individualized plan of care/goals and shares the quality data associated with the providers.       Yes [] No [x] [facility] had already been chosen    SNF/Rehab Transition:  Patient has been accepted to return to [facility] SNF/Rehab and meets criteria for admission.   Date of Inpatient Status Order: will return 1/30/2025  Patient will transported by S transport with [facility] and expected to leave at 1600.    Communication to SNF/Rehab:  Bedside RN, , has been notified to update the transition plan to the facility and call report [phone], main line, ask for nurse for [address] [place]  Discharge information has been updated on the AVS. And communicated to facility via [employer], or CC link.     Discharge instructions to be sent to facility    Nursing

## 2025-01-30 NOTE — DISCHARGE SUMMARY
Discharge Summary    Name: Yogi Lynne Sr.  [id number]  YOB: 1955 (Age: 70 y.o.)   Date of Admission: 1/24/2025  Date of Discharge: 1/30/2025  Attending Physician: Maura Pugh MD    Discharge Diagnosis:   Severe sepsis POA- resolved  Acute hypoxic respiratory failure POA- improved, was on 15 L high flow oxygen, now down to RA and stable  Respiratory distress with increased WOB  Aspiration pneumonia  Hypokalemia - 3.4 today, replenished  Recent right MCA stroke, 12/2024 (at [facility])  Parkinson's disease  Hyperlipidemia  Hypertension  Alcohol use disorder  Postpolio muscular atrophy  Stage 3 pressure injury to left sacrum/buttock  POA  Stage 2 to right sacrum/buttocks POA  Left medial foot DTPI POA  DNR      Consultations:  IP CONSULT TO PHARMACY      Brief Admission History/Reason for Admission Per Joshua Galvin MD:   \"70 y.o. male with Parkinson's, hypertension, postpolio muscular atrophy and a recent stroke who presents via EMS from the nursing facility because of vomiting and shortness of breath.  Patient reportedly was vomiting this morning and later during the day began to have a cough and progressive shortness of breath.  EMS was dispatched and noted patient to be tachypneic with wet lung sounds.  Blood pressure 168/76 heart rate 122 respiratory rate 30 and 90% on room air.  On arrival to the ED patient had a temperature 100.8, respiratory 30, heart rate 120 and was hypoxic 88% on room air.  His respiratory condition worsened and he was in respiratory distress.  He was started on 15 L high flow oxygen.     Workup in the ED included the following:  -CXR Diffuse interstitial infiltrate. Correlate for interstitial edema versus atypical infection.  -CTA of chest: No PE.  No airspace disease.  -paired Blood cultures  -Influenza screen negative  -SARS-CoV-2 PCR negative     Intensivist was initially consulted but felt patient can be managed on stepdown.

## 2025-01-30 NOTE — DISCHARGE INSTRUCTIONS
HOSPITALIST DISCHARGE INSTRUCTIONS    NAME: Yogi Lynne .   :  1955   MRN:  [id number]     Date/Time:  2025 12:47 PM    ADMIT DATE: 2025     DISCHARGE DATE: 2025     DISCHARGE DIAGNOSIS:  Severe sepsis POA- resolved  Acute hypoxic respiratory failure POA- improved, was on 15 L high flow oxygen, now down to RA and stable  Respiratory distress with increased WOB  Aspiration pneumonia  Hypokalemia - 3.4 today, replenished  Recent right MCA stroke, 2024 (at [facility])  Parkinson's disease  Hyperlipidemia  Hypertension  Alcohol use disorder  Postpolio muscular atrophy  Stage 3 pressure injury to left sacrum/buttock  POA  Stage 2 to right sacrum/buttocks POA  Left medial foot DTPI POA  DNR    MEDICATIONS:  As per medication reconciliation  list  It is important that you take the medication exactly as they are prescribed.   Keep your medication in the bottles provided by the pharmacist and keep a list of the medication names, dosages, and times to be taken in your wallet.   Do not take other medications without consulting your doctor.     Pain Management: per above medications    What to do at Home    Recommended diet:   Dysphagia - minced and moist diet  + Tube Feeding va PEG: Jevity 1.5 at 20mL/hr, advancing 10mL q4hrs as tolerated to goal rate of 60mL/hr, H2O flushes 180mL q4hrs       Recommended activity: activity as tolerated    If you have questions regarding the hospital related prescriptions or hospital related issues please call at .    If you experience any of the following symptoms then please call your primary care physician or return to the emergency room if you cannot get hold of your doctor:  Fever, chills, nausea, vomiting, diarrhea, change in mentation, falling, bleeding, shortness of breath,     Follow Up:  PCP  you are to call and set up an appointment to see them in 7-10 days.      Information obtained by :  I understand that if any problems occur once I

## 2025-01-30 NOTE — PROGRESS NOTES
Cancelled appointment on 8/4/20 with Dr. Morataya at patient's request.    I have reviewed discharge instructions with the patient and caregiver. The patient and caregiver verbalized understanding. Discharge medications reviewed with patient and caregiver and appropriate educational materials and side effects teaching were provided. Follow-up appointments reviewed. Opportunity for questions and clarification was provided.  Venous access removed without difficulty.  Patient's belongings gathered and sent with patient. Patient is ready for discharge.     Discharge instructions discussed with patient and AVS placed in blue folder that was taken with him. Patient discharged with [facility] on stretcher, returning to [facility]. Report given to Viridiana WATERMAN at [facility].    America Saucedo, RN

## 2025-01-30 NOTE — PLAN OF CARE
Problem: Physical Therapy - Adult  Goal: By Discharge: Performs mobility at highest level of function for planned discharge setting.  See evaluation for individualized goals.  Description: FUNCTIONAL STATUS PRIOR TO ADMISSION: Per chart, patient was independent with wheelchair prior to having a stroke in December 2024 in R MCA territory. Patient has since been at SNF. Patient rather poor historian of recent events and unable to describe functional mobility status. A PT note on 1/7/25 at [facility] indicates Max to Total A x 2 for functional mobility.    HOME SUPPORT PRIOR TO ADMISSION: Lived alone with limited social support.    Physical Therapy Goals  Initiated 1/29/2025  1.  Patient will roll side to side in bed with minimal assistance within 7 day(s).    2.  Patient will move from supine to sit and sit to supine in bed with moderate assistance within 7 day(s).    3.  Patient will perform sit to stand with maximal assistance within 7 day(s).  4.  Patient will transfer from bed to chair and chair to bed with maximal assistance using the least restrictive device within 7 day(s).    1/29/2025 1259 by Avelino Moss, PT  Outcome: Not Progressing     Problem: Occupational Therapy - Adult  Goal: By Discharge: Performs self-care activities at highest level of function for planned discharge setting.  See evaluation for individualized goals.  Description: FUNCTIONAL STATUS PRIOR TO ADMISSION:  Pt has been at SNF since Dec 2024 post R MCA CVA. Pt unable to state how he was participating in therapy.   ,  ,  ,  ,  ,  ,  ,  ,  ,  , Active : No     HOME SUPPORT: Patient admitted from SNF.    Occupational Therapy Goals:  Initiated 1/29/2025  1.  Patient will perform self-feeding with Minimal Assist within 7 day(s).  2.  Patient will perform grooming with Minimal Assist within 7 day(s).  3.  Patient will perform upper body dressing with Moderate Assist within 7 day(s).  4.  Patient will perform rolling R<>L with Moderate Assist

## 2025-02-01 LAB
EKG ATRIAL RATE: 120 BPM
EKG DIAGNOSIS: NORMAL
EKG P AXIS: 43 DEGREES
EKG P-R INTERVAL: 174 MS
EKG Q-T INTERVAL: 306 MS
EKG QRS DURATION: 100 MS
EKG QTC CALCULATION (BAZETT): 432 MS
EKG R AXIS: -31 DEGREES
EKG T AXIS: 104 DEGREES
EKG VENTRICULAR RATE: 120 BPM

## 2025-02-02 ENCOUNTER — APPOINTMENT (OUTPATIENT)
Facility: HOSPITAL | Age: 70
DRG: 871 | End: 2025-02-02
Payer: MEDICARE

## 2025-02-02 ENCOUNTER — HOSPITAL ENCOUNTER (INPATIENT)
Facility: HOSPITAL | Age: 70
LOS: 3 days | Discharge: NURSING, REHAB OR LONG-TERM CARE | DRG: 871 | End: 2025-02-05
Admitting: INTERNAL MEDICINE
Payer: MEDICARE

## 2025-02-02 PROBLEM — J16.0 CAP (COMMUNITY ACQUIRED PNEUMONIA) DUE TO CHLAMYDIA SPECIES: Status: ACTIVE | Noted: 2025-02-02

## 2025-02-02 LAB
ALBUMIN SERPL-MCNC: 2.6 G/DL (ref 3.5–5)
ALBUMIN/GLOB SERPL: 0.5 (ref 1.1–2.2)
ALP SERPL-CCNC: 80 U/L (ref 45–117)
ALT SERPL-CCNC: 35 U/L (ref 12–78)
ANION GAP SERPL CALC-SCNC: 8 MMOL/L (ref 2–12)
APPEARANCE UR: CLEAR
AST SERPL-CCNC: 54 U/L (ref 15–37)
BACTERIA URNS QL MICRO: NEGATIVE /HPF
BASOPHILS # BLD: 0.06 K/UL (ref 0–0.1)
BASOPHILS NFR BLD: 0.3 % (ref 0–1)
BILIRUB SERPL-MCNC: 0.7 MG/DL (ref 0.2–1)
BILIRUB UR QL: NEGATIVE
BUN SERPL-MCNC: 17 MG/DL (ref 6–20)
BUN/CREAT SERPL: 20 (ref 12–20)
CALCIUM SERPL-MCNC: 9 MG/DL (ref 8.5–10.1)
CHLORIDE SERPL-SCNC: 102 MMOL/L (ref 97–108)
CO2 SERPL-SCNC: 23 MMOL/L (ref 21–32)
COLOR UR: ABNORMAL
CREAT SERPL-MCNC: 0.87 MG/DL (ref 0.7–1.3)
DIFFERENTIAL METHOD BLD: ABNORMAL
EKG ATRIAL RATE: 131 BPM
EKG DIAGNOSIS: NORMAL
EKG P AXIS: 58 DEGREES
EKG P-R INTERVAL: 162 MS
EKG Q-T INTERVAL: 292 MS
EKG QRS DURATION: 90 MS
EKG QTC CALCULATION (BAZETT): 431 MS
EKG R AXIS: -26 DEGREES
EKG T AXIS: 105 DEGREES
EKG VENTRICULAR RATE: 131 BPM
EOSINOPHIL # BLD: 0.13 K/UL (ref 0–0.4)
EOSINOPHIL NFR BLD: 0.7 % (ref 0–7)
EPITH CASTS URNS QL MICRO: ABNORMAL /LPF
ERYTHROCYTE [DISTWIDTH] IN BLOOD BY AUTOMATED COUNT: 13.8 % (ref 11.5–14.5)
GLOBULIN SER CALC-MCNC: 4.9 G/DL (ref 2–4)
GLUCOSE BLD-MCNC: 126 MG/DL (ref 74–99)
GLUCOSE SERPL-MCNC: 120 MG/DL (ref 65–100)
GLUCOSE UR STRIP.AUTO-MCNC: NEGATIVE MG/DL
HCT VFR BLD AUTO: 40.4 % (ref 36.6–50.3)
HGB BLD-MCNC: 13.5 G/DL (ref 12.1–17)
HGB UR QL STRIP: NEGATIVE
IMM GRANULOCYTES # BLD AUTO: 0.11 K/UL (ref 0–0.04)
IMM GRANULOCYTES NFR BLD AUTO: 0.6 % (ref 0–0.5)
KETONES UR QL STRIP.AUTO: NEGATIVE MG/DL
LACTATE BLD-SCNC: 3.36 MMOL/L (ref 0.4–2)
LACTATE SERPL-SCNC: 2.1 MMOL/L (ref 0.4–2)
LACTATE SERPL-SCNC: 2.1 MMOL/L (ref 0.4–2)
LEUKOCYTE ESTERASE UR QL STRIP.AUTO: NEGATIVE
LYMPHOCYTES # BLD: 0.8 K/UL (ref 0.8–3.5)
LYMPHOCYTES NFR BLD: 4.3 % (ref 12–49)
MCH RBC QN AUTO: 30.1 PG (ref 26–34)
MCHC RBC AUTO-ENTMCNC: 33.4 G/DL (ref 30–36.5)
MCV RBC AUTO: 90.2 FL (ref 80–99)
MONOCYTES # BLD: 0.48 K/UL (ref 0–1)
MONOCYTES NFR BLD: 2.6 % (ref 5–13)
NEUTS SEG # BLD: 17.02 K/UL (ref 1.8–8)
NEUTS SEG NFR BLD: 91.5 % (ref 32–75)
NITRITE UR QL STRIP.AUTO: NEGATIVE
NRBC # BLD: 0 K/UL (ref 0–0.01)
NRBC BLD-RTO: 0 PER 100 WBC
OTHER: ABNORMAL
PH UR STRIP: 7 (ref 5–8)
PLATELET # BLD AUTO: 478 K/UL (ref 150–400)
PMV BLD AUTO: 10 FL (ref 8.9–12.9)
POTASSIUM SERPL-SCNC: 4.2 MMOL/L (ref 3.5–5.1)
PROCALCITONIN SERPL-MCNC: 0.54 NG/ML
PROT SERPL-MCNC: 7.5 G/DL (ref 6.4–8.2)
PROT UR STRIP-MCNC: 30 MG/DL
RBC # BLD AUTO: 4.48 M/UL (ref 4.1–5.7)
RBC #/AREA URNS HPF: ABNORMAL /HPF (ref 0–5)
RBC MORPH BLD: ABNORMAL
SERVICE CMNT-IMP: ABNORMAL
SODIUM SERPL-SCNC: 133 MMOL/L (ref 136–145)
SP GR UR REFRACTOMETRY: 1.02
TROPONIN I SERPL HS-MCNC: 7 NG/L (ref 0–76)
URINE CULTURE IF INDICATED: ABNORMAL
UROBILINOGEN UR QL STRIP.AUTO: 1 EU/DL (ref 0.2–1)
WBC # BLD AUTO: 18.6 K/UL (ref 4.1–11.1)
WBC URNS QL MICRO: ABNORMAL /HPF (ref 0–4)
YEAST BUDDING URNS QL: PRESENT

## 2025-02-02 PROCEDURE — 87040 BLOOD CULTURE FOR BACTERIA: CPT

## 2025-02-02 PROCEDURE — 36415 COLL VENOUS BLD VENIPUNCTURE: CPT

## 2025-02-02 PROCEDURE — 2580000003 HC RX 258: Performed by: INTERNAL MEDICINE

## 2025-02-02 PROCEDURE — 83605 ASSAY OF LACTIC ACID: CPT

## 2025-02-02 PROCEDURE — 82962 GLUCOSE BLOOD TEST: CPT

## 2025-02-02 PROCEDURE — 6360000002 HC RX W HCPCS: Performed by: NURSE PRACTITIONER

## 2025-02-02 PROCEDURE — 96365 THER/PROPH/DIAG IV INF INIT: CPT

## 2025-02-02 PROCEDURE — 6360000004 HC RX CONTRAST MEDICATION

## 2025-02-02 PROCEDURE — 6360000002 HC RX W HCPCS

## 2025-02-02 PROCEDURE — 2580000003 HC RX 258: Performed by: NURSE PRACTITIONER

## 2025-02-02 PROCEDURE — 85025 COMPLETE CBC W/AUTO DIFF WBC: CPT

## 2025-02-02 PROCEDURE — 6370000000 HC RX 637 (ALT 250 FOR IP): Performed by: INTERNAL MEDICINE

## 2025-02-02 PROCEDURE — 2000000000 HC ICU R&B

## 2025-02-02 PROCEDURE — 71275 CT ANGIOGRAPHY CHEST: CPT

## 2025-02-02 PROCEDURE — 96361 HYDRATE IV INFUSION ADD-ON: CPT

## 2025-02-02 PROCEDURE — 2700000000 HC OXYGEN THERAPY PER DAY

## 2025-02-02 PROCEDURE — 99285 EMERGENCY DEPT VISIT HI MDM: CPT

## 2025-02-02 PROCEDURE — 71045 X-RAY EXAM CHEST 1 VIEW: CPT

## 2025-02-02 PROCEDURE — 81001 URINALYSIS AUTO W/SCOPE: CPT

## 2025-02-02 PROCEDURE — 2580000003 HC RX 258

## 2025-02-02 PROCEDURE — 96375 TX/PRO/DX INJ NEW DRUG ADDON: CPT

## 2025-02-02 PROCEDURE — 84145 PROCALCITONIN (PCT): CPT

## 2025-02-02 PROCEDURE — 80053 COMPREHEN METABOLIC PANEL: CPT

## 2025-02-02 PROCEDURE — 93005 ELECTROCARDIOGRAM TRACING: CPT

## 2025-02-02 PROCEDURE — 5A0935A ASSISTANCE WITH RESPIRATORY VENTILATION, LESS THAN 24 CONSECUTIVE HOURS, HIGH NASAL FLOW/VELOCITY: ICD-10-PCS | Performed by: INTERNAL MEDICINE

## 2025-02-02 PROCEDURE — 2500000003 HC RX 250 WO HCPCS: Performed by: NURSE PRACTITIONER

## 2025-02-02 PROCEDURE — 84484 ASSAY OF TROPONIN QUANT: CPT

## 2025-02-02 PROCEDURE — 6370000000 HC RX 637 (ALT 250 FOR IP): Performed by: NURSE PRACTITIONER

## 2025-02-02 PROCEDURE — 6360000002 HC RX W HCPCS: Performed by: INTERNAL MEDICINE

## 2025-02-02 RX ORDER — SODIUM CHLORIDE, SODIUM LACTATE, POTASSIUM CHLORIDE, AND CALCIUM CHLORIDE .6; .31; .03; .02 G/100ML; G/100ML; G/100ML; G/100ML
1000 INJECTION, SOLUTION INTRAVENOUS
Status: COMPLETED | OUTPATIENT
Start: 2025-02-02 | End: 2025-02-02

## 2025-02-02 RX ORDER — SODIUM CHLORIDE, SODIUM LACTATE, POTASSIUM CHLORIDE, AND CALCIUM CHLORIDE .6; .31; .03; .02 G/100ML; G/100ML; G/100ML; G/100ML
1000 INJECTION, SOLUTION INTRAVENOUS ONCE
Status: COMPLETED | OUTPATIENT
Start: 2025-02-02 | End: 2025-02-02

## 2025-02-02 RX ORDER — ENOXAPARIN SODIUM 100 MG/ML
40 INJECTION SUBCUTANEOUS DAILY
Status: DISCONTINUED | OUTPATIENT
Start: 2025-02-02 | End: 2025-02-05 | Stop reason: HOSPADM

## 2025-02-02 RX ORDER — ACETAMINOPHEN 325 MG/1
650 TABLET ORAL EVERY 6 HOURS PRN
Status: DISCONTINUED | OUTPATIENT
Start: 2025-02-02 | End: 2025-02-05 | Stop reason: HOSPADM

## 2025-02-02 RX ORDER — ACETAMINOPHEN 650 MG/1
650 SUPPOSITORY RECTAL EVERY 6 HOURS PRN
Status: DISCONTINUED | OUTPATIENT
Start: 2025-02-02 | End: 2025-02-05 | Stop reason: HOSPADM

## 2025-02-02 RX ORDER — SODIUM CHLORIDE, SODIUM LACTATE, POTASSIUM CHLORIDE, AND CALCIUM CHLORIDE .6; .31; .03; .02 G/100ML; G/100ML; G/100ML; G/100ML
30 INJECTION, SOLUTION INTRAVENOUS ONCE
Status: DISCONTINUED | OUTPATIENT
Start: 2025-02-02 | End: 2025-02-02

## 2025-02-02 RX ORDER — VANCOMYCIN 2 G/400ML
2000 INJECTION, SOLUTION INTRAVENOUS
Status: COMPLETED | OUTPATIENT
Start: 2025-02-02 | End: 2025-02-02

## 2025-02-02 RX ORDER — MAGNESIUM SULFATE IN WATER 40 MG/ML
2000 INJECTION, SOLUTION INTRAVENOUS PRN
Status: DISCONTINUED | OUTPATIENT
Start: 2025-02-02 | End: 2025-02-05 | Stop reason: HOSPADM

## 2025-02-02 RX ORDER — SODIUM CHLORIDE 0.9 % (FLUSH) 0.9 %
5-40 SYRINGE (ML) INJECTION PRN
Status: DISCONTINUED | OUTPATIENT
Start: 2025-02-02 | End: 2025-02-05 | Stop reason: HOSPADM

## 2025-02-02 RX ORDER — SODIUM CHLORIDE 0.9 % (FLUSH) 0.9 %
5-40 SYRINGE (ML) INJECTION EVERY 12 HOURS SCHEDULED
Status: DISCONTINUED | OUTPATIENT
Start: 2025-02-02 | End: 2025-02-05 | Stop reason: HOSPADM

## 2025-02-02 RX ORDER — ONDANSETRON 2 MG/ML
4 INJECTION INTRAMUSCULAR; INTRAVENOUS EVERY 6 HOURS PRN
Status: DISCONTINUED | OUTPATIENT
Start: 2025-02-02 | End: 2025-02-05 | Stop reason: HOSPADM

## 2025-02-02 RX ORDER — ONDANSETRON 4 MG/1
4 TABLET, ORALLY DISINTEGRATING ORAL EVERY 8 HOURS PRN
Status: DISCONTINUED | OUTPATIENT
Start: 2025-02-02 | End: 2025-02-05 | Stop reason: HOSPADM

## 2025-02-02 RX ORDER — SODIUM CHLORIDE 9 MG/ML
INJECTION, SOLUTION INTRAVENOUS PRN
Status: DISCONTINUED | OUTPATIENT
Start: 2025-02-02 | End: 2025-02-05 | Stop reason: HOSPADM

## 2025-02-02 RX ORDER — IPRATROPIUM BROMIDE AND ALBUTEROL SULFATE 2.5; .5 MG/3ML; MG/3ML
1 SOLUTION RESPIRATORY (INHALATION) EVERY 4 HOURS PRN
Status: DISCONTINUED | OUTPATIENT
Start: 2025-02-02 | End: 2025-02-05 | Stop reason: HOSPADM

## 2025-02-02 RX ORDER — POLYETHYLENE GLYCOL 3350 17 G/17G
17 POWDER, FOR SOLUTION ORAL DAILY PRN
Status: DISCONTINUED | OUTPATIENT
Start: 2025-02-02 | End: 2025-02-05 | Stop reason: HOSPADM

## 2025-02-02 RX ORDER — IOPAMIDOL 755 MG/ML
100 INJECTION, SOLUTION INTRAVASCULAR
Status: COMPLETED | OUTPATIENT
Start: 2025-02-02 | End: 2025-02-02

## 2025-02-02 RX ORDER — POTASSIUM CHLORIDE 7.45 MG/ML
10 INJECTION INTRAVENOUS PRN
Status: DISCONTINUED | OUTPATIENT
Start: 2025-02-02 | End: 2025-02-05 | Stop reason: HOSPADM

## 2025-02-02 RX ORDER — POTASSIUM CHLORIDE 29.8 MG/ML
20 INJECTION INTRAVENOUS PRN
Status: DISCONTINUED | OUTPATIENT
Start: 2025-02-02 | End: 2025-02-05 | Stop reason: HOSPADM

## 2025-02-02 RX ADMIN — SODIUM CHLORIDE 40 MG: 9 INJECTION INTRAMUSCULAR; INTRAVENOUS; SUBCUTANEOUS at 06:44

## 2025-02-02 RX ADMIN — PIPERACILLIN AND TAZOBACTAM 3375 MG: 3; .375 INJECTION, POWDER, LYOPHILIZED, FOR SOLUTION INTRAVENOUS at 04:17

## 2025-02-02 RX ADMIN — Medication 1 AMPULE: at 21:30

## 2025-02-02 RX ADMIN — VANCOMYCIN 2000 MG: 2 INJECTION, SOLUTION INTRAVENOUS at 05:10

## 2025-02-02 RX ADMIN — SODIUM CHLORIDE, POTASSIUM CHLORIDE, SODIUM LACTATE AND CALCIUM CHLORIDE 1000 ML: 600; 310; 30; 20 INJECTION, SOLUTION INTRAVENOUS at 04:17

## 2025-02-02 RX ADMIN — PIPERACILLIN AND TAZOBACTAM 3375 MG: 3; .375 INJECTION, POWDER, LYOPHILIZED, FOR SOLUTION INTRAVENOUS at 09:52

## 2025-02-02 RX ADMIN — SODIUM CHLORIDE, POTASSIUM CHLORIDE, SODIUM LACTATE AND CALCIUM CHLORIDE 1000 ML: 600; 310; 30; 20 INJECTION, SOLUTION INTRAVENOUS at 06:42

## 2025-02-02 RX ADMIN — ENOXAPARIN SODIUM 40 MG: 100 INJECTION SUBCUTANEOUS at 09:41

## 2025-02-02 RX ADMIN — SODIUM CHLORIDE, PRESERVATIVE FREE 10 ML: 5 INJECTION INTRAVENOUS at 09:47

## 2025-02-02 RX ADMIN — SODIUM CHLORIDE 40 MG: 9 INJECTION INTRAMUSCULAR; INTRAVENOUS; SUBCUTANEOUS at 17:20

## 2025-02-02 RX ADMIN — ACETAMINOPHEN 650 MG: 650 SUPPOSITORY RECTAL at 09:00

## 2025-02-02 RX ADMIN — PIPERACILLIN AND TAZOBACTAM 3375 MG: 3; .375 INJECTION, POWDER, LYOPHILIZED, FOR SOLUTION INTRAVENOUS at 17:23

## 2025-02-02 RX ADMIN — SODIUM CHLORIDE, PRESERVATIVE FREE 10 ML: 5 INJECTION INTRAVENOUS at 20:43

## 2025-02-02 RX ADMIN — IOPAMIDOL 75 ML: 755 INJECTION, SOLUTION INTRAVENOUS at 04:38

## 2025-02-02 ASSESSMENT — PAIN SCALES - GENERAL
PAINLEVEL_OUTOF10: 0

## 2025-02-02 ASSESSMENT — LIFESTYLE VARIABLES
HOW OFTEN DO YOU HAVE A DRINK CONTAINING ALCOHOL: NEVER
HOW MANY STANDARD DRINKS CONTAINING ALCOHOL DO YOU HAVE ON A TYPICAL DAY: PATIENT DOES NOT DRINK

## 2025-02-02 NOTE — PROGRESS NOTES
1000: Dr. Bolton notified of pt's fever.     1845: Dr. Bolton at bedside, orders received to remove sutures from PEG tube and staples from abdominal incision. 3 sutures removed from PEG, 12 staples removed form abdominal incision.

## 2025-02-02 NOTE — ED NOTES
Bedside shift change report given to RN Madelin (oncoming nurse) by RN Paulie (offgoing nurse). Report included the following information Nurse Handoff Report, ED Encounter Summary, ED SBAR, and MAR.

## 2025-02-02 NOTE — ED NOTES
TRANSFER - OUT REPORT:    Verbal report given to GUEVARA Amado on Yogi Lynne Sr.  being transferred to CCU for routine progression of patient care       Report consisted of patient's Situation, Background, Assessment and   Recommendations(SBAR).     Information from the following report(s) Nurse Handoff Report, ED Encounter Summary, ED SBAR, and MAR was reviewed with the receiving nurse.           Lines:   Peripheral IV 02/02/25 Right Antecubital (Active)       Peripheral IV 02/02/25 Left Antecubital (Active)        Opportunity for questions and clarification was provided.      Patient transported with:  Monitor, O2 @ 15lpm, and Registered Nurse

## 2025-02-02 NOTE — H&P
CRITICAL CARE NOTE      Name: Yogi Lynne Sr.   : 1955   MRN: [id number]   Date: 2025      REASON FOR ICU ADMISSION: Acute hypoxic resp failure     PRINCIPAL ICU DIAGNOSIS     Acute hypoxic resp failure  CAP  Aspiration PNA  Sepsis  Hyponatremia  Leukocytosis  Lactic acidosis    Patient Active Problem List   Diagnosis    Hyperlipidemia    Postpolio muscular atrophy    H/O colonoscopy with polypectomy    SIRS (systemic inflammatory response syndrome) (HCC)    Vomiting    Hx of rheumatic heart disease    Excessive drinking alcohol    Hypercholesterolemia with hypertriglyceridemia    GIB (gastrointestinal bleeding)    Acute hypoxemic respiratory failure    CAP (community acquired pneumonia) due to Chlamydia species         BRIEF PATIENT SUMMARY     69 yo M with Hx sig for parkinson, HTN, postpolio muscular atrophy. Comes from SNF by EMS for inc WOB, SOB. Recent Hx F/C, cough, emesis. In ED, tachycardic, tachypnic and hypoxic. Placed on NHHFNC and Abx. Chest CT d/w CAP v. Aspiration.    ICU c/s for admission.  Met patient in room. Ill and in acute distress. Awake and able to give 1 word answers.  Will admit to ICU for cont w/u and Mx.    HOSPITAL COURSE/DAILY EVENT LOG     24 HOUR EVENTS: as above    COMPREHENSIVE ASSESSMENT & PLAN:SYSTEM BASED     NEUROLOGICAL:     Aggressive Pain Control.  Spontaneous Awakening Trial: Pending  Pain Medications: prn  Target RASS: 0  Sedation Medications: prn    PULMONOLOGY:     Pulmonary toilet: HOB, IS, pulm nebs  SpO2 Goal: > 92%    AHRF/Sepsis/CAP/Aspiration PNA    Start HHFNC  Pulm hygiene  Abx    CARDIOVASCULAR:     Vasopressors to keep MAP 65 and above for adequate tissue perfusion.   SBP Goal of: > 90 mmHg  MAP Goal of: > 65 mmHg  Cardiac Gtts: none  to maintain SBP/MAP goals  Transfusion Trigger (Hgb): <7 g/dL  Keep K > 4; Mg > 2     Sepsis    IVF resuscitation  Serial lactates    GASTROINTESTINAL:      GI Prophylaxis: PPI  Bowel Regimen: prn  Feeding:

## 2025-02-03 LAB — PROCALCITONIN SERPL-MCNC: 3.39 NG/ML

## 2025-02-03 PROCEDURE — 6370000000 HC RX 637 (ALT 250 FOR IP): Performed by: INTERNAL MEDICINE

## 2025-02-03 PROCEDURE — 6360000002 HC RX W HCPCS: Performed by: INTERNAL MEDICINE

## 2025-02-03 PROCEDURE — 2580000003 HC RX 258: Performed by: INTERNAL MEDICINE

## 2025-02-03 PROCEDURE — 2500000003 HC RX 250 WO HCPCS: Performed by: NURSE PRACTITIONER

## 2025-02-03 PROCEDURE — 1100000003 HC PRIVATE W/ TELEMETRY

## 2025-02-03 PROCEDURE — 36415 COLL VENOUS BLD VENIPUNCTURE: CPT

## 2025-02-03 PROCEDURE — 84145 PROCALCITONIN (PCT): CPT

## 2025-02-03 PROCEDURE — 6360000002 HC RX W HCPCS: Performed by: NURSE PRACTITIONER

## 2025-02-03 PROCEDURE — 2580000003 HC RX 258: Performed by: NURSE PRACTITIONER

## 2025-02-03 PROCEDURE — 2500000003 HC RX 250 WO HCPCS: Performed by: INTERNAL MEDICINE

## 2025-02-03 RX ORDER — FLUOXETINE 20 MG/5ML
20 SOLUTION ORAL DAILY
Status: DISCONTINUED | OUTPATIENT
Start: 2025-02-04 | End: 2025-02-05 | Stop reason: HOSPADM

## 2025-02-03 RX ORDER — SODIUM CHLORIDE, SODIUM LACTATE, POTASSIUM CHLORIDE, CALCIUM CHLORIDE 600; 310; 30; 20 MG/100ML; MG/100ML; MG/100ML; MG/100ML
INJECTION, SOLUTION INTRAVENOUS CONTINUOUS
Status: DISCONTINUED | OUTPATIENT
Start: 2025-02-03 | End: 2025-02-03

## 2025-02-03 RX ORDER — SENNA AND DOCUSATE SODIUM 50; 8.6 MG/1; MG/1
2 TABLET, FILM COATED ORAL DAILY
Status: DISCONTINUED | OUTPATIENT
Start: 2025-02-03 | End: 2025-02-05 | Stop reason: HOSPADM

## 2025-02-03 RX ORDER — LANSOPRAZOLE 30 MG/1
30 TABLET, ORALLY DISINTEGRATING, DELAYED RELEASE ORAL
Status: DISCONTINUED | OUTPATIENT
Start: 2025-02-04 | End: 2025-02-05 | Stop reason: HOSPADM

## 2025-02-03 RX ORDER — CARBIDOPA AND LEVODOPA 25; 100 MG/1; MG/1
1 TABLET ORAL 3 TIMES DAILY
Status: DISCONTINUED | OUTPATIENT
Start: 2025-02-03 | End: 2025-02-05 | Stop reason: HOSPADM

## 2025-02-03 RX ORDER — FOLIC ACID 1 MG/1
1 TABLET ORAL DAILY
Status: DISCONTINUED | OUTPATIENT
Start: 2025-02-03 | End: 2025-02-05 | Stop reason: HOSPADM

## 2025-02-03 RX ORDER — ATORVASTATIN CALCIUM 40 MG/1
80 TABLET, FILM COATED ORAL NIGHTLY
Status: DISCONTINUED | OUTPATIENT
Start: 2025-02-03 | End: 2025-02-05 | Stop reason: HOSPADM

## 2025-02-03 RX ORDER — METOPROLOL TARTRATE 25 MG/1
25 TABLET, FILM COATED ORAL 2 TIMES DAILY
Status: DISCONTINUED | OUTPATIENT
Start: 2025-02-04 | End: 2025-02-05 | Stop reason: HOSPADM

## 2025-02-03 RX ORDER — GAUZE BANDAGE 2" X 2"
100 BANDAGE TOPICAL DAILY
Status: DISCONTINUED | OUTPATIENT
Start: 2025-02-03 | End: 2025-02-05 | Stop reason: HOSPADM

## 2025-02-03 RX ORDER — ASPIRIN 81 MG/1
81 TABLET, CHEWABLE ORAL DAILY
Status: DISCONTINUED | OUTPATIENT
Start: 2025-02-03 | End: 2025-02-05 | Stop reason: HOSPADM

## 2025-02-03 RX ADMIN — ATORVASTATIN CALCIUM 80 MG: 40 TABLET, FILM COATED ORAL at 21:55

## 2025-02-03 RX ADMIN — SODIUM CHLORIDE, PRESERVATIVE FREE 10 ML: 5 INJECTION INTRAVENOUS at 21:55

## 2025-02-03 RX ADMIN — PIPERACILLIN AND TAZOBACTAM 3375 MG: 3; .375 INJECTION, POWDER, LYOPHILIZED, FOR SOLUTION INTRAVENOUS at 02:21

## 2025-02-03 RX ADMIN — Medication 100 MG: at 12:22

## 2025-02-03 RX ADMIN — ENOXAPARIN SODIUM 40 MG: 100 INJECTION SUBCUTANEOUS at 08:56

## 2025-02-03 RX ADMIN — AMIODARONE HYDROCHLORIDE 150 MG: 1.5 INJECTION, SOLUTION INTRAVENOUS at 08:56

## 2025-02-03 RX ADMIN — Medication 1 AMPULE: at 21:55

## 2025-02-03 RX ADMIN — Medication 1 AMPULE: at 08:59

## 2025-02-03 RX ADMIN — Medication 6 MG: at 21:55

## 2025-02-03 RX ADMIN — ASPIRIN 81 MG: 81 TABLET, CHEWABLE ORAL at 12:22

## 2025-02-03 RX ADMIN — FOLIC ACID 1 MG: 1 TABLET ORAL at 12:22

## 2025-02-03 RX ADMIN — SODIUM CHLORIDE 40 MG: 9 INJECTION INTRAMUSCULAR; INTRAVENOUS; SUBCUTANEOUS at 06:51

## 2025-02-03 RX ADMIN — SODIUM CHLORIDE, PRESERVATIVE FREE 10 ML: 5 INJECTION INTRAVENOUS at 08:58

## 2025-02-03 RX ADMIN — PIPERACILLIN AND TAZOBACTAM 3375 MG: 3; .375 INJECTION, POWDER, LYOPHILIZED, FOR SOLUTION INTRAVENOUS at 18:07

## 2025-02-03 RX ADMIN — SENNOSIDES AND DOCUSATE SODIUM 2 TABLET: 50; 8.6 TABLET ORAL at 12:22

## 2025-02-03 RX ADMIN — CARBIDOPA AND LEVODOPA 1 TABLET: 25; 100 TABLET ORAL at 21:55

## 2025-02-03 RX ADMIN — PIPERACILLIN AND TAZOBACTAM 3375 MG: 3; .375 INJECTION, POWDER, LYOPHILIZED, FOR SOLUTION INTRAVENOUS at 09:47

## 2025-02-03 ASSESSMENT — PAIN - FUNCTIONAL ASSESSMENT: PAIN_FUNCTIONAL_ASSESSMENT: ACTIVITIES ARE NOT PREVENTED

## 2025-02-03 ASSESSMENT — PAIN DESCRIPTION - DESCRIPTORS: DESCRIPTORS: ACHING

## 2025-02-03 ASSESSMENT — PAIN SCALES - GENERAL
PAINLEVEL_OUTOF10: 0
PAINLEVEL_OUTOF10: 2
PAINLEVEL_OUTOF10: 0
PAINLEVEL_OUTOF10: 0

## 2025-02-03 ASSESSMENT — PAIN DESCRIPTION - LOCATION: LOCATION: ABDOMEN;CHEST

## 2025-02-03 ASSESSMENT — PAIN DESCRIPTION - PAIN TYPE: TYPE: ACUTE PAIN

## 2025-02-03 ASSESSMENT — PAIN DESCRIPTION - ONSET: ONSET: ON-GOING

## 2025-02-03 ASSESSMENT — PAIN DESCRIPTION - FREQUENCY: FREQUENCY: INTERMITTENT

## 2025-02-03 ASSESSMENT — PAIN DESCRIPTION - ORIENTATION: ORIENTATION: ANTERIOR;MID

## 2025-02-03 NOTE — PROGRESS NOTES
SUMMARY:  70M with history of Parkinson's, dementia, hypertension, postpolio muscular atrophy.  Recently discharged from this hospital 1/30 after a 6-day hospitalization for acute hypoxemic respiratory failure attributed to aspiration pneumonia.  Returned 2/2 to the [facility] emergency department with tachycardia, tachypnea, hypoxemia.  Started on heated high flow nasal cannula oxygen and admitted to the ICU/CCM service.  Presumptive diagnosis recurrent aspiration pneumonia.    CTA chest 02/02: No evidence of central pulmonary embolus. Diffuse bilateral nodular airspace disease may represent pneumonia or aspiration.    SUBJ/EVENTS:  Brief episode of PSVT this early morning.  Broke transiently with carotid massage.  Received amiodarone 150 mg bolus.  No further episodes.  Metoprolol resumed.  No overt distress on room air.  Poorly oriented.  Conversant and follows commands.    OBJ:  Vitals:    02/03/25 1000 02/03/25 1100 02/03/25 1200 02/03/25 1247   BP: (!) 144/55 (!) 147/63 (!) 135/55    Pulse: 81 75 72    Resp: 29 29 25    Temp:   98.6 °F (37 °C)    TempSrc:   Oral    SpO2: 100% 100% 100%    Weight:       Height:    1.778 m (5' 10\")     Physical Exam:  GEN: Chronically ill-appearing, NAD  HEENT: NCAT, sclerae white  NECK: JVP not visualized  CHEST: Few scattered rhonchi, no wheezes  CARDIAC: sinus rhythm, regular, III/VI systolic murmur of aortic origin  ABD: Soft, NT, +BS, gastrostomy tube in place, site is clean  EXT: Warm, no edema, normal capillary refill  NEURO: Cranial nerves intact, symmetric strength, no focal deficits noted  DERM: No lesions noted    I have reviewed the lab results from this day. Relevant abnormalities are discussed in the impression and plan    CXRAY: No new film      IMPRESSION:  Baseline: SNF resident with severe Parkinson's, postpolio syndrome, dementia.    DNR/DNI previously established.  Acute hypoxic respiratory failure, resolved or resolving  Presumed aspiration pneumonia, appears

## 2025-02-03 NOTE — PROGRESS NOTES
Attended Interdisciplinary rounds in CCU where patient care was discussed.      Visited by: Chaplain Nick Crow M.Div., [facility].   Paging Service: [phone])

## 2025-02-03 NOTE — PROGRESS NOTES
Pt is admitted for acute hypoxic RF due to presumed aspiration pna.  Had brief SVT this AM, s/p amiodarone boluc.  Now BB is resumed.  Cont' IV abx.  Currently stable on RA.    Cont' TF per Gtube.  Trend labs.     Non billable round.

## 2025-02-03 NOTE — PROGRESS NOTES
Critical care interdisciplinary rounds today.  Following members present: Case Management, , Clinical Lead, Diabetes Team, Nursing, Nutrition, Pharmacy, and Physician.   Plan of care discussed.  See clinical pathway for plan of care and interventions and desired outcomes.

## 2025-02-03 NOTE — PROGRESS NOTES
0720: Report received from GUEVARA Zuleta at bedside.    0850: Pt noted to be in SVT, Dr. Ardon called to bedside, Amio bolus initiated. Pt self converted before administration.    1000: Pt consistently asking for water, informed of NPO status each time, continues asking.     1040: IDR completed.     1135: Pt given ice chips per MD, pt unable to keep them in mouth, coughing fit, drops chips from mouth onto chest.     1157: [facility] called in line w/ procedures for Palliative consult. TOD requested.     1200: Reassessment completed, see flowsheets.    1445: Attempted to call report, Meghan WATERMAN said she would call back, currently situating patient who just arrived to their unit.     1500: Pt transport to room 1111, moved to new bed via sheet pull w/ unit nurses including assigned nurse.

## 2025-02-03 NOTE — PROGRESS NOTES
Comprehensive Nutrition Assessment    Type and Reason for Visit:  Initial, Positive nutrition screen, Nutrition support    Nutrition Recommendations/Plan:   Agree with resumption of home TF:  Jevity 1.5 @ 60mL/h + 100mL flush q 4h (provides 2160kcals/91gPro/310gCHO/1694mL)   Can adjust FWF prn, kept volume low 2' hyponatremia   Diet advancement will be deferred to SLP     Malnutrition Assessment:  Malnutrition Status:  Insufficient data (02/03/25 1357)    Context:  Acute Illness     Findings of the 6 clinical characteristics of malnutrition:  Energy Intake:  Unable to assess  Weight Loss:  Mild weight loss (1.5% since last admit)     Body Fat Loss:   (UTD 2' post polio muscular atrophy)     Muscle Mass Loss:   (UTD 2' post polio musular atrophy)    Fluid Accumulation:  No fluid accumulation     Strength:  Not Performed    Nutrition Assessment:  Pt admitted with CAP.  PMH: HTN, CVA, Parkinson's dementia, post polio muscular atrophy, PEG.  Chart reviewed for PI and previous G tube.  Chart reviewed, case discussed during CCU rounds.  Pt disoriented to situation, no family in the room to speak with.  Known from previous admission in January during which he passed MBS and was cleared for minced and moist and thin liquids by SLP.  PEG clamped, plans to resume previous TF order of Jevity 1.5 @ 60mL/h.  Will cut back to 100mL FWF due to hyponatremia.  Palliative consulted for [facility].  Mild wt loss noted since last admit.  He has a muscular atrophy condition, unable to differentiate this from malnutrition related fat and muscle wasting.   Wt Readings from Last 10 Encounters:   02/02/25 82.7 kg (182 lb 5.1 oz)   01/24/25 84.2 kg (185 lb 10 oz)            Nutrition Related Findings:    Meds: folvite, prevacid, zosyn, senokot, thiamine.    BM: PTA   Wound Type: Stage III (L buttocks), Deep Tissue Injury (L foot)       Current Nutrition Intake & Therapies:    Average Meal Intake: NPO     Diet NPO  ADULT TUBE FEEDING; PEG;

## 2025-02-03 NOTE — CONSULTS
PALLIATIVE MEDICINE      Palliative Medicine was consulted for goals of care.  Due to high consult volume will see pt tomorrow.      Thank you for including Palliative Medicine in this patient's care.   BUZZ Jaramillo

## 2025-02-04 LAB
ALBUMIN SERPL-MCNC: 2.1 G/DL (ref 3.5–5)
ALBUMIN/GLOB SERPL: 0.5 (ref 1.1–2.2)
ALP SERPL-CCNC: 60 U/L (ref 45–117)
ALT SERPL-CCNC: 27 U/L (ref 12–78)
ANION GAP SERPL CALC-SCNC: 5 MMOL/L (ref 2–12)
AST SERPL-CCNC: 29 U/L (ref 15–37)
BASOPHILS # BLD: 0.04 K/UL (ref 0–0.1)
BASOPHILS NFR BLD: 0.4 % (ref 0–1)
BILIRUB SERPL-MCNC: 0.6 MG/DL (ref 0.2–1)
BUN SERPL-MCNC: 18 MG/DL (ref 6–20)
BUN/CREAT SERPL: 25 (ref 12–20)
CALCIUM SERPL-MCNC: 8.6 MG/DL (ref 8.5–10.1)
CHLORIDE SERPL-SCNC: 109 MMOL/L (ref 97–108)
CO2 SERPL-SCNC: 23 MMOL/L (ref 21–32)
CREAT SERPL-MCNC: 0.72 MG/DL (ref 0.7–1.3)
DIFFERENTIAL METHOD BLD: ABNORMAL
EOSINOPHIL # BLD: 0.33 K/UL (ref 0–0.4)
EOSINOPHIL NFR BLD: 3 % (ref 0–7)
ERYTHROCYTE [DISTWIDTH] IN BLOOD BY AUTOMATED COUNT: 13.9 % (ref 11.5–14.5)
GLOBULIN SER CALC-MCNC: 4.3 G/DL (ref 2–4)
GLUCOSE SERPL-MCNC: 108 MG/DL (ref 65–100)
HCT VFR BLD AUTO: 34.4 % (ref 36.6–50.3)
HGB BLD-MCNC: 11.4 G/DL (ref 12.1–17)
IMM GRANULOCYTES # BLD AUTO: 0.08 K/UL (ref 0–0.04)
IMM GRANULOCYTES NFR BLD AUTO: 0.7 % (ref 0–0.5)
LYMPHOCYTES # BLD: 1.54 K/UL (ref 0.8–3.5)
LYMPHOCYTES NFR BLD: 14 % (ref 12–49)
MCH RBC QN AUTO: 30.8 PG (ref 26–34)
MCHC RBC AUTO-ENTMCNC: 33.1 G/DL (ref 30–36.5)
MCV RBC AUTO: 93 FL (ref 80–99)
MONOCYTES # BLD: 0.72 K/UL (ref 0–1)
MONOCYTES NFR BLD: 6.6 % (ref 5–13)
NEUTS SEG # BLD: 8.26 K/UL (ref 1.8–8)
NEUTS SEG NFR BLD: 75.3 % (ref 32–75)
NRBC # BLD: 0 K/UL (ref 0–0.01)
NRBC BLD-RTO: 0 PER 100 WBC
PLATELET # BLD AUTO: 364 K/UL (ref 150–400)
PMV BLD AUTO: 9.8 FL (ref 8.9–12.9)
POTASSIUM SERPL-SCNC: 3.3 MMOL/L (ref 3.5–5.1)
PROCALCITONIN SERPL-MCNC: 1.96 NG/ML
PROT SERPL-MCNC: 6.4 G/DL (ref 6.4–8.2)
RBC # BLD AUTO: 3.7 M/UL (ref 4.1–5.7)
SODIUM SERPL-SCNC: 137 MMOL/L (ref 136–145)
WBC # BLD AUTO: 11 K/UL (ref 4.1–11.1)

## 2025-02-04 PROCEDURE — 6360000002 HC RX W HCPCS: Performed by: INTERNAL MEDICINE

## 2025-02-04 PROCEDURE — 2580000003 HC RX 258: Performed by: INTERNAL MEDICINE

## 2025-02-04 PROCEDURE — 6360000002 HC RX W HCPCS: Performed by: NURSE PRACTITIONER

## 2025-02-04 PROCEDURE — 2500000003 HC RX 250 WO HCPCS: Performed by: NURSE PRACTITIONER

## 2025-02-04 PROCEDURE — 80053 COMPREHEN METABOLIC PANEL: CPT

## 2025-02-04 PROCEDURE — 84145 PROCALCITONIN (PCT): CPT

## 2025-02-04 PROCEDURE — 85025 COMPLETE CBC W/AUTO DIFF WBC: CPT

## 2025-02-04 PROCEDURE — 6370000000 HC RX 637 (ALT 250 FOR IP): Performed by: INTERNAL MEDICINE

## 2025-02-04 PROCEDURE — 1100000003 HC PRIVATE W/ TELEMETRY

## 2025-02-04 PROCEDURE — 36415 COLL VENOUS BLD VENIPUNCTURE: CPT

## 2025-02-04 RX ORDER — POTASSIUM CHLORIDE 1.5 G/1.58G
40 POWDER, FOR SOLUTION ORAL ONCE
Status: COMPLETED | OUTPATIENT
Start: 2025-02-04 | End: 2025-02-04

## 2025-02-04 RX ADMIN — Medication 6 MG: at 21:15

## 2025-02-04 RX ADMIN — CARBIDOPA AND LEVODOPA 1 TABLET: 25; 100 TABLET ORAL at 13:35

## 2025-02-04 RX ADMIN — SODIUM CHLORIDE, PRESERVATIVE FREE 10 ML: 5 INJECTION INTRAVENOUS at 08:09

## 2025-02-04 RX ADMIN — FLUOXETINE HYDROCHLORIDE 20 MG: 20 SOLUTION ORAL at 08:10

## 2025-02-04 RX ADMIN — FOLIC ACID 1 MG: 1 TABLET ORAL at 08:09

## 2025-02-04 RX ADMIN — SODIUM CHLORIDE, PRESERVATIVE FREE 10 ML: 5 INJECTION INTRAVENOUS at 21:15

## 2025-02-04 RX ADMIN — METOPROLOL TARTRATE 25 MG: 25 TABLET, FILM COATED ORAL at 21:15

## 2025-02-04 RX ADMIN — ATORVASTATIN CALCIUM 80 MG: 40 TABLET, FILM COATED ORAL at 21:15

## 2025-02-04 RX ADMIN — POTASSIUM CHLORIDE 40 MEQ: 1.5 FOR SOLUTION ORAL at 12:26

## 2025-02-04 RX ADMIN — CARBIDOPA AND LEVODOPA 1 TABLET: 25; 100 TABLET ORAL at 08:09

## 2025-02-04 RX ADMIN — SENNOSIDES AND DOCUSATE SODIUM 2 TABLET: 50; 8.6 TABLET ORAL at 08:09

## 2025-02-04 RX ADMIN — PIPERACILLIN AND TAZOBACTAM 3375 MG: 3; .375 INJECTION, POWDER, LYOPHILIZED, FOR SOLUTION INTRAVENOUS at 02:20

## 2025-02-04 RX ADMIN — CARBIDOPA AND LEVODOPA 1 TABLET: 25; 100 TABLET ORAL at 21:15

## 2025-02-04 RX ADMIN — ASPIRIN 81 MG: 81 TABLET, CHEWABLE ORAL at 08:09

## 2025-02-04 RX ADMIN — Medication 100 MG: at 08:10

## 2025-02-04 RX ADMIN — PIPERACILLIN AND TAZOBACTAM 3375 MG: 3; .375 INJECTION, POWDER, LYOPHILIZED, FOR SOLUTION INTRAVENOUS at 10:00

## 2025-02-04 RX ADMIN — PIPERACILLIN AND TAZOBACTAM 3375 MG: 3; .375 INJECTION, POWDER, LYOPHILIZED, FOR SOLUTION INTRAVENOUS at 15:01

## 2025-02-04 RX ADMIN — METOPROLOL TARTRATE 25 MG: 25 TABLET, FILM COATED ORAL at 08:09

## 2025-02-04 RX ADMIN — Medication 1 AMPULE: at 21:15

## 2025-02-04 RX ADMIN — ENOXAPARIN SODIUM 40 MG: 100 INJECTION SUBCUTANEOUS at 08:09

## 2025-02-04 ASSESSMENT — PAIN SCALES - GENERAL: PAINLEVEL_OUTOF10: 0

## 2025-02-04 NOTE — PROGRESS NOTES
End of Shift Note    Bedside shift change report given to donny (oncoming nurse) by Emily Mai RN (offgoing nurse).  Report included the following information SBAR    Shift worked:  7a-7p     Shift summary and any significant changes:    Pt VSS, A&OX4, pt able to communicate needs. Wound care provided this shift, malewick changed and partial linen change completed. Pt now on a soft minced and moist diet. Tube feed running 30ml/hr. Pt tolerating tube feeds at this time.      Concerns for physician to address:      Zone phone for oncoming shift:  [phone]     Activity:  Level of Assistance: Dependent, patient does less than 25%  Number times ambulated in hallways past shift: 0  Number of times OOB to chair past shift: 0    Cardiac:   Cardiac Monitoring: Yes      Cardiac Rhythm: Sinus rhythm    Access:  Current line(s): PIV ,     Genitourinary:   Urinary Status: Voiding, Incontinent, External catheter    Respiratory:   O2 Device: None (Room air)  Chronic home O2 use?: NO  Incentive spirometer at bedside: NO    GI:  Last BM (including prior to admit): 01/25/25  Current diet:  ADULT TUBE FEEDING; PEG; Standard with Fiber; Continuous; 20; Yes; 10; Q 4 hours; 60; 100; Q 4 hours  DIET ONE TIME MESSAGE;  ADULT DIET; Dysphagia - Minced and Moist  Passing flatus: YES    Pain Management:   Patient states pain is manageable on current regimen: YES    Skin:  Conrado Scale Score: 12  Interventions: Wound Offloading (Prevention Methods): Turning, Repositioning, Pillows, Bed, pressure reduction mattress    Patient Safety:  Fall Risk: Nursing Judgement-Fall Risk High(Add Comments): Yes  Fall Risk Interventions  Nursing Judgement-Fall Risk High(Add Comments): Yes  Toilet Every 2 Hours-In Advance of Need: Yes  Hourly Visual Checks: Awake, In bed  Fall Visual Posted: Fall sign posted, Socks  Room Door Open: Yes  Alarm On: Bed  Patient Moved Closer to Nursing Station: No    Active Consults:   IP CONSULT TO HOSPICE    Length of

## 2025-02-04 NOTE — PROGRESS NOTES
End of Shift Note    Bedside shift change report given to  (oncoming nurse) by Meghan Bailey RN (offgoing nurse).  Report included the following information SBAR, Intake/Output, MAR, and Recent Results    Shift worked:  7a-7p     Shift summary and any significant changes:     Pt transferred from ICU. AOX2, VSS. Pt cont on IV abx, no fever noted. Cont on turning and repositioning. Denied pain and distress.      Concerns for physician to address:       Zone phone for oncoming shift:          Activity:  Level of Assistance: Dependent, patient does less than 25%  Number times ambulated in hallways past shift: 0  Number of times OOB to chair past shift: 0    Cardiac:   Cardiac Monitoring: Yes      Cardiac Rhythm: Sinus rhythm    Access:  Current line(s): PIV     Genitourinary:   Urinary Status: External catheter, Voiding    Respiratory:   O2 Device: None (Room air)  Chronic home O2 use?: NO  Incentive spirometer at bedside: NO    GI:  Last BM (including prior to admit):  (pta)  Current diet:  Diet NPO  ADULT TUBE FEEDING; PEG; Standard with Fiber; Continuous; 20; Yes; 10; Q 4 hours; 60; 100; Q 4 hours  DIET ONE TIME MESSAGE;  Passing flatus: YES    Pain Management:   Patient states pain is manageable on current regimen: YES    Skin:  Conrado Scale Score: 12  Interventions: Wound Offloading (Prevention Methods): Bed, pressure redistribution/air, Bed, pressure reduction mattress, Blankets, Elevate heels, Pillows, Repositioning, Turning    Patient Safety:  Fall Risk: Nursing Judgement-Fall Risk High(Add Comments): Yes  Fall Risk Interventions  Nursing Judgement-Fall Risk High(Add Comments): Yes  Toilet Every 2 Hours-In Advance of Need: Yes  Hourly Visual Checks: Awake, Quiet, In bed  Fall Visual Posted: Armband  Room Door Open: Yes  Alarm On: Bed  Patient Moved Closer to Nursing Station: No    Active Consults:   IP CONSULT TO PALLIATIVE CARE  IP CONSULT TO PALLIATIVE CARE    Length of Stay:  Expected LOS: 5  Actual LOS:

## 2025-02-04 NOTE — PROGRESS NOTES
This nurse attempted to float heels and keep heels off of the bed. Pt has refused repeatedly, a pillow has been placed in place of the heels up cushion. Pt satisfied at this time and agrees to the pillows instead.

## 2025-02-04 NOTE — PROGRESS NOTES
Hospitalist Progress Note    NAME:   Yogi Lynne .   : 1955   MRN: [id number]     Date/Time: 2025 12:01 PM  Patient PCP: Lino Regan DO    Estimated discharge date: ?-6, back to LTC with Hospice/Comfort care with DO NOT Re-Admit this time  Barriers: LTC with comfort bed arranged at [facility], Hospice consulted      Assessment / Plan:      Severe sepsis POA- resolving  Acute hypoxic respiratory failure POA- resolved again, now back on RA oxygen today  Respiratory distress with increased WOB - comfortable now  Recurrent Aspiration pneumonias  Hypokalemia - 3.3 today  -CXR Diffuse interstitial infiltrate. Correlate for interstitial edema versus atypical infection.  -CTA of chest: No evidence of central pulmonary embolus. Diffuse bilateral nodular airspace disease may represent pneumonia or aspiration.  -paired Blood cultures- neg x 2 days  -Influenza screen negative     Cont IV Zosyn for now  -Continue Oxygen- tapered off to RA now  Recent SLP eval last week noted- s/p MBS recc- cont meds via PEG only + TF recommended as pt not at goal from caloric needs just by PO diet- Po diet per SLP recc now  Replenish K+ via PEG again today  Pt DNR- IP Hospice consulted for DC back to LTC with Comfort care/Hospice as per pt wishes today AM with Palliative consult  IP CM consulted for DC planning     Recent right MCA stroke, 2024  Admitted 24 to [facility] after being found down on the ground by neighbors.  He was diagnosed with a large right MCA territory stroke, distal right M2 segment occlusion on MRA.  Thrombectomy was not pursued due to amount of core infarction and prolonged downtime.  Patient was also diagnosed with right upper lobe pneumonia, right middle lobe nodule 5 mm.  Patient was treated with Zosyn and discharged to nursing home on aspirin 81, Lipitor 80.      Parkinson's disease  Hyperlipidemia  Hypertension  Alcohol use disorder  Postpolio muscular atrophy  Stage 3 pressure injury to

## 2025-02-04 NOTE — WOUND CARE
2         Recommend:    Sacral/buttock Stage 3 PI's: due to patient incontinence will need to change dressing daily and PRN soiling under dressing. Cleanse wounds with wound cleanser spray or soap and water. Apply Therahoney gel to wounds and cover with a sacral foam dressing.    Perineum/scrotum: prn, cleanse with soap and water. Apply a thin layer of zinc cream to affected skin.    Bilateral feet and heel wounds: try to alleviate pressure by floating using boots/pillows/heelz-up device.    LILY GRAY RN, CWON

## 2025-02-04 NOTE — CONSULTS
Palliative Medicine  Patient Name: Yogi Lynne Sr.  YOB: 1955  MRN: [id number]  Age: 70 y.o.  Gender: male     Discussed case with Dr. Pugh who plans to cancel Palliative Medicine consult and pursue hospice plan of care for patient moving forward.      Palliative Medicine will no longer follow this patient. Please re-consult if appropriate.

## 2025-02-04 NOTE — PLAN OF CARE
Problem: Safety - Adult  Goal: Free from fall injury  2/4/2025 1150 by Lillie Guzman, RN  Outcome: Progressing  2/4/2025 1009 by Emily Mai, RN  Outcome: Progressing

## 2025-02-05 VITALS
HEIGHT: 70 IN | WEIGHT: 182.32 LBS | OXYGEN SATURATION: 95 % | SYSTOLIC BLOOD PRESSURE: 151 MMHG | HEART RATE: 67 BPM | BODY MASS INDEX: 26.1 KG/M2 | DIASTOLIC BLOOD PRESSURE: 62 MMHG | RESPIRATION RATE: 18 BRPM | TEMPERATURE: 97.5 F

## 2025-02-05 LAB — PROCALCITONIN SERPL-MCNC: 0.92 NG/ML

## 2025-02-05 PROCEDURE — 6370000000 HC RX 637 (ALT 250 FOR IP): Performed by: INTERNAL MEDICINE

## 2025-02-05 PROCEDURE — 84145 PROCALCITONIN (PCT): CPT

## 2025-02-05 PROCEDURE — 6360000002 HC RX W HCPCS: Performed by: NURSE PRACTITIONER

## 2025-02-05 PROCEDURE — 2580000003 HC RX 258: Performed by: INTERNAL MEDICINE

## 2025-02-05 PROCEDURE — 6360000002 HC RX W HCPCS: Performed by: INTERNAL MEDICINE

## 2025-02-05 PROCEDURE — 36415 COLL VENOUS BLD VENIPUNCTURE: CPT

## 2025-02-05 PROCEDURE — 2500000003 HC RX 250 WO HCPCS: Performed by: NURSE PRACTITIONER

## 2025-02-05 RX ADMIN — Medication 100 MG: at 08:45

## 2025-02-05 RX ADMIN — SODIUM CHLORIDE, PRESERVATIVE FREE 10 ML: 5 INJECTION INTRAVENOUS at 08:44

## 2025-02-05 RX ADMIN — LANSOPRAZOLE 30 MG: 30 TABLET, ORALLY DISINTEGRATING, DELAYED RELEASE ORAL at 08:45

## 2025-02-05 RX ADMIN — PIPERACILLIN AND TAZOBACTAM 3375 MG: 3; .375 INJECTION, POWDER, LYOPHILIZED, FOR SOLUTION INTRAVENOUS at 08:52

## 2025-02-05 RX ADMIN — CARBIDOPA AND LEVODOPA 1 TABLET: 25; 100 TABLET ORAL at 14:51

## 2025-02-05 RX ADMIN — ASPIRIN 81 MG: 81 TABLET, CHEWABLE ORAL at 08:45

## 2025-02-05 RX ADMIN — Medication 1 AMPULE: at 08:46

## 2025-02-05 RX ADMIN — CARBIDOPA AND LEVODOPA 1 TABLET: 25; 100 TABLET ORAL at 08:44

## 2025-02-05 RX ADMIN — PIPERACILLIN AND TAZOBACTAM 3375 MG: 3; .375 INJECTION, POWDER, LYOPHILIZED, FOR SOLUTION INTRAVENOUS at 02:30

## 2025-02-05 RX ADMIN — METOPROLOL TARTRATE 25 MG: 25 TABLET, FILM COATED ORAL at 08:45

## 2025-02-05 RX ADMIN — ENOXAPARIN SODIUM 40 MG: 100 INJECTION SUBCUTANEOUS at 08:44

## 2025-02-05 RX ADMIN — FLUOXETINE HYDROCHLORIDE 20 MG: 20 SOLUTION ORAL at 08:45

## 2025-02-05 RX ADMIN — FOLIC ACID 1 MG: 1 TABLET ORAL at 08:45

## 2025-02-05 NOTE — CARE COORDINATION
Care Management Initial Assessment       RUR: 18  Readmission? Yes   1st IM letter given? Yes   1st  letter given: No         02/03/25 2107   Service Assessment   Patient Orientation Alert and Oriented   Cognition Alert   History Provided By Medical Record   Primary Caregiver Other (Comment)  (Patient has been at [facility])   Support Systems Spouse/Significant Other;Other (Comment)  (The patient is  and he has no children. He has 3 brothers and his brother, Rai Lynne, in [place] is his primary support and POA. He has 1 other brother that lives in [place] and 1 brother in [place]. His friend, Leno Mirza, lives locally and is supportive)   Patient's Healthcare Decision Maker is: Named in Scanned ACP Document   PCP Verified by CM Yes   Last Visit to PCP Within last 3 months   Prior Functional Level Assistance with the following:;Bathing;Dressing;Toileting;Feeding;Cooking;Housework;Shopping;Mobility   Current Functional Level Assistance with the following:;Bathing;Dressing;Toileting;Feeding;Cooking;Housework;Shopping;Mobility   Can patient return to prior living arrangement Yes   Family able to assist with home care needs: No   Would you like for me to discuss the discharge plan with any other family members/significant others, and if so, who? Yes  (Rai Lynne, Brother, Phone: [phone])   Social/Functional History   Lives With Other (Comment)  (The patient has been at at a SNF, [facility], since his stroke in December)   Active  No   Patient's  Info The patient uses medical transport   Occupation Retired   Discharge Planning   Type of Residence Skilled Nursing Facility   Patient expects to be discharged to: Skilled nursing facility  (Member admitted from  [facility])   Services At/After Discharge   Mode of Transport at Discharge BLS     Readmission Assessment  Number of Days since last admission?: 8-30 days  Previous Disposition: SNF  What was the 
Changes  []Dialysis with transportation  [x]PEG Care  []Oxygen  []Daily Weights for Heart Failure    Dietary:  []Any diet limitations  []Tube Feedings   []Total Parenteral Management (TPN)    Financial Resources:  []Medicaid Application Completed    []UAI Completed and copy given to pt/family  and copy given to pt/family  []A screening has previously been completed.    []Level II Completed    [x] Private pay individual who will not become   financially eligible for Medicaid within 6 months from admission to a [facility].     [] Individual refused to have screening conducted.     []Medicaid Application Completed    []The screening denied because it was determined individual did not need/did not qualify for nursing facility level of care.  [] Out of state residents seeking direct admission to a VA nursing facility.  [] Individuals who are inpatients of an out of state hospital, or in state or out of state veterans/ hospital and seek direct admission to a VA nursing facility  [] Individuals who are pateints or residents of a state owned/operated facility that is licensed by Department of Behavioral Services (DBHDS) and seek direct admission to VA nursing facility  [] A screening not required for enrollment in Medicaid Hospice services as set out in 12 VAC 30-  [] [facility] ([facility]) staff shall perform screenings of the [facility] clients.    Advanced Care Plan:  []Surrogate Decision Maker of Care  []POA  []Communicated Code Status and copy sent.    Other:         Radha Venegas

## 2025-02-05 NOTE — DISCHARGE SUMMARY
Discharge Summary    Name: Yogi Lynne Sr.  [id number]  YOB: 1955 (Age: 70 y.o.)   Date of Admission: 2/2/2025  Date of Discharge: 2/5/2025  Attending Physician: Sinai Gamble MD    Discharge Diagnosis:     Consultations:  IP CONSULT TO HOSPICE      Brief Admission History/Reason for Admission Per Moris Gonzalez MD:   71 yo M with Hx sig for parkinson, HTN, postpolio muscular atrophy. Comes from SNF by EMS for inc WOB, SOB. Recent Hx F/C, cough, emesis. In ED, tachycardic, tachypnic and hypoxic. Placed on NHHFNC and Abx. Chest CT d/w CAP v. Aspiration.     ICU c/s for admission.  Met patient in room. Ill and in acute distress. Awake and able to give 1 word answers.  Will admit to ICU for cont w/u and Mx.    Brief Hospital Course by Main Problems:   Severe sepsis POA- resolving  Acute hypoxic respiratory failure POA- resolved again, now back on RA oxygen today  Respiratory distress with increased WOB - comfortable now  Recurrent Aspiration pneumonias  Hypokalemia - 3.3 today  -CXR Diffuse interstitial infiltrate. Correlate for interstitial edema versus atypical infection.  -CTA of chest: No evidence of central pulmonary embolus. Diffuse bilateral nodular airspace disease may represent pneumonia or aspiration.  -paired Blood cultures- neg x 2 days  -Influenza screen negative  Patient was treated with Zosyn inpatient  Patient weaned off oxygen  Recent SLP evals/p MBS recc- cont meds via PEG only + TF recommended as pt not at goal from caloric needs just by PO diet- Po diet per SLP recc now     Dr. Pugh had goals of care discussion-plan is to discharge back to long-term care with hospice as patient continue minced moist diet.  Will transition to Augmentin on discharge.  ecent right MCA stroke, 12/2024  Admitted 12/14/24 to [facility] after being found down on the ground by neighbors.  He was diagnosed with a large right MCA territory stroke, distal right M2 segment

## 2025-02-05 NOTE — PROGRESS NOTES
Patient determined to be stable for discharge by attending provider. I have reviewed the discharge instructions and follow-up appointments with the pt and [facility] . They verbalized understanding and all questions were answered to their satisfaction. No complaints or further questions were expressed.       New medications: Appropriate educational materials and medication side effect teaching were provided.       PIV were removed prior to discharge. Report called to [facility]. Discharge paperwork sent with transport to facility     All personal items collected during admission were returned to the patient prior to discharge.    Emily Mai RN

## 2025-02-08 LAB
BACTERIA SPEC CULT: NORMAL
BACTERIA SPEC CULT: NORMAL
SERVICE CMNT-IMP: NORMAL
SERVICE CMNT-IMP: NORMAL

## 2025-02-17 LAB — LACTATE BLD-SCNC: 3.12 MMOL/L (ref 0.4–2)

## 2025-06-07 ENCOUNTER — HOSPITAL ENCOUNTER (INPATIENT)
Facility: HOSPITAL | Age: 70
LOS: 4 days | Discharge: HOSPICE | DRG: 871 | End: 2025-06-11
Attending: STUDENT IN AN ORGANIZED HEALTH CARE EDUCATION/TRAINING PROGRAM | Admitting: STUDENT IN AN ORGANIZED HEALTH CARE EDUCATION/TRAINING PROGRAM
Payer: MEDICARE

## 2025-06-07 ENCOUNTER — APPOINTMENT (OUTPATIENT)
Facility: HOSPITAL | Age: 70
DRG: 871 | End: 2025-06-07
Payer: MEDICARE

## 2025-06-07 DIAGNOSIS — A41.9 SEVERE SEPSIS (HCC): Primary | ICD-10-CM

## 2025-06-07 DIAGNOSIS — N17.9 AKI (ACUTE KIDNEY INJURY): ICD-10-CM

## 2025-06-07 DIAGNOSIS — J96.01 ACUTE HYPOXIC RESPIRATORY FAILURE (HCC): ICD-10-CM

## 2025-06-07 DIAGNOSIS — I47.10 SVT (SUPRAVENTRICULAR TACHYCARDIA): ICD-10-CM

## 2025-06-07 DIAGNOSIS — J69.0 ASPIRATION PNEUMONIA OF BOTH LUNGS, UNSPECIFIED ASPIRATION PNEUMONIA TYPE, UNSPECIFIED PART OF LUNG (HCC): ICD-10-CM

## 2025-06-07 DIAGNOSIS — R65.20 SEVERE SEPSIS (HCC): Primary | ICD-10-CM

## 2025-06-07 LAB
ALBUMIN SERPL-MCNC: 2.8 G/DL (ref 3.5–5)
ALBUMIN/GLOB SERPL: 0.4 (ref 1.1–2.2)
ALP SERPL-CCNC: 100 U/L (ref 45–117)
ALT SERPL-CCNC: 19 U/L (ref 12–78)
AMORPH CRY URNS QL MICRO: ABNORMAL
ANION GAP SERPL CALC-SCNC: 6 MMOL/L (ref 2–12)
ANION GAP SERPL CALC-SCNC: 6 MMOL/L (ref 2–12)
APPEARANCE UR: ABNORMAL
AST SERPL-CCNC: 69 U/L (ref 15–37)
BACTERIA URNS QL MICRO: NEGATIVE /HPF
BASE EXCESS BLD CALC-SCNC: 1.5 MMOL/L
BASOPHILS # BLD: 0 K/UL (ref 0–0.1)
BASOPHILS NFR BLD: 0 % (ref 0–1)
BILIRUB SERPL-MCNC: 1.4 MG/DL (ref 0.2–1)
BILIRUB UR QL: NEGATIVE
BUN SERPL-MCNC: 45 MG/DL (ref 6–20)
BUN SERPL-MCNC: 49 MG/DL (ref 6–20)
BUN/CREAT SERPL: 25 (ref 12–20)
BUN/CREAT SERPL: 33 (ref 12–20)
CALCIUM SERPL-MCNC: 10.7 MG/DL (ref 8.5–10.1)
CALCIUM SERPL-MCNC: 9.9 MG/DL (ref 8.5–10.1)
CHLORIDE SERPL-SCNC: 108 MMOL/L (ref 97–108)
CHLORIDE SERPL-SCNC: 116 MMOL/L (ref 97–108)
CO2 SERPL-SCNC: 24 MMOL/L (ref 21–32)
CO2 SERPL-SCNC: 27 MMOL/L (ref 21–32)
COLOR UR: ABNORMAL
CREAT SERPL-MCNC: 1.38 MG/DL (ref 0.7–1.3)
CREAT SERPL-MCNC: 1.98 MG/DL (ref 0.7–1.3)
DIFFERENTIAL METHOD BLD: ABNORMAL
EKG DIAGNOSIS: NORMAL
EKG DIAGNOSIS: NORMAL
EKG Q-T INTERVAL: 268 MS
EKG Q-T INTERVAL: 328 MS
EKG QRS DURATION: 82 MS
EKG QRS DURATION: 86 MS
EKG QTC CALCULATION (BAZETT): 433 MS
EKG QTC CALCULATION (BAZETT): 488 MS
EKG R AXIS: -27 DEGREES
EKG R AXIS: -31 DEGREES
EKG T AXIS: 107 DEGREES
EKG T AXIS: 147 DEGREES
EKG VENTRICULAR RATE: 133 BPM
EKG VENTRICULAR RATE: 157 BPM
EOSINOPHIL # BLD: 0 K/UL (ref 0–0.4)
EOSINOPHIL NFR BLD: 0 % (ref 0–7)
EPITH CASTS URNS QL MICRO: ABNORMAL /LPF
ERYTHROCYTE [DISTWIDTH] IN BLOOD BY AUTOMATED COUNT: 14.2 % (ref 11.5–14.5)
ERYTHROCYTE [DISTWIDTH] IN BLOOD BY AUTOMATED COUNT: 14.3 % (ref 11.5–14.5)
GLOBULIN SER CALC-MCNC: 6.4 G/DL (ref 2–4)
GLUCOSE SERPL-MCNC: 110 MG/DL (ref 65–100)
GLUCOSE SERPL-MCNC: 111 MG/DL (ref 65–100)
GLUCOSE UR STRIP.AUTO-MCNC: NEGATIVE MG/DL
GRAN CASTS URNS QL MICRO: ABNORMAL /LPF
HCO3 BLD-SCNC: 27.8 MMOL/L (ref 21–28)
HCT VFR BLD AUTO: 40.9 % (ref 36.6–50.3)
HCT VFR BLD AUTO: 43.7 % (ref 36.6–50.3)
HGB BLD-MCNC: 13 G/DL (ref 12.1–17)
HGB BLD-MCNC: 13.9 G/DL (ref 12.1–17)
HGB UR QL STRIP: ABNORMAL
IMM GRANULOCYTES # BLD AUTO: 0 K/UL (ref 0–0.04)
IMM GRANULOCYTES NFR BLD AUTO: 0 % (ref 0–0.5)
INR PPP: 1.1 (ref 0.9–1.1)
KETONES UR QL STRIP.AUTO: NEGATIVE MG/DL
LACTATE BLD-SCNC: 2.34 MMOL/L (ref 0.4–2)
LACTATE BLD-SCNC: 5.11 MMOL/L (ref 0.4–2)
LACTATE SERPL-SCNC: 2.5 MMOL/L (ref 0.4–2)
LEUKOCYTE ESTERASE UR QL STRIP.AUTO: NEGATIVE
LYMPHOCYTES # BLD: 2.93 K/UL (ref 0.8–3.5)
LYMPHOCYTES NFR BLD: 14 % (ref 12–49)
MCH RBC QN AUTO: 30.7 PG (ref 26–34)
MCH RBC QN AUTO: 31 PG (ref 26–34)
MCHC RBC AUTO-ENTMCNC: 31.8 G/DL (ref 30–36.5)
MCHC RBC AUTO-ENTMCNC: 31.8 G/DL (ref 30–36.5)
MCV RBC AUTO: 96.7 FL (ref 80–99)
MCV RBC AUTO: 97.5 FL (ref 80–99)
MONOCYTES # BLD: 1.25 K/UL (ref 0–1)
MONOCYTES NFR BLD: 6 % (ref 5–13)
NEUTS BAND NFR BLD MANUAL: 8 %
NEUTS SEG # BLD: 16.72 K/UL (ref 1.8–8)
NEUTS SEG NFR BLD: 72 % (ref 32–75)
NITRITE UR QL STRIP.AUTO: NEGATIVE
NRBC # BLD: 0 K/UL (ref 0–0.01)
NRBC # BLD: 0 K/UL (ref 0–0.01)
NRBC BLD-RTO: 0 PER 100 WBC
NRBC BLD-RTO: 0 PER 100 WBC
PCO2 BLD: 48.4 MMHG (ref 35–48)
PH BLD: 7.37 (ref 7.35–7.45)
PH UR STRIP: 5 (ref 5–8)
PLATELET # BLD AUTO: 372 K/UL (ref 150–400)
PLATELET # BLD AUTO: 508 K/UL (ref 150–400)
PMV BLD AUTO: 10.5 FL (ref 8.9–12.9)
PMV BLD AUTO: 11.4 FL (ref 8.9–12.9)
PO2 BLD: <27 MMHG (ref 83–108)
POTASSIUM SERPL-SCNC: 3.4 MMOL/L (ref 3.5–5.1)
POTASSIUM SERPL-SCNC: 4.8 MMOL/L (ref 3.5–5.1)
PROCALCITONIN SERPL-MCNC: 5.1 NG/ML
PROT SERPL-MCNC: 9.2 G/DL (ref 6.4–8.2)
PROT UR STRIP-MCNC: 100 MG/DL
PROTHROMBIN TIME: 11.4 SEC (ref 9.2–11.2)
RBC # BLD AUTO: 4.23 M/UL (ref 4.1–5.7)
RBC # BLD AUTO: 4.48 M/UL (ref 4.1–5.7)
RBC #/AREA URNS HPF: ABNORMAL /HPF (ref 0–5)
RBC MORPH BLD: ABNORMAL
SERVICE CMNT-IMP: ABNORMAL
SERVICE CMNT-IMP: ABNORMAL
SODIUM SERPL-SCNC: 141 MMOL/L (ref 136–145)
SODIUM SERPL-SCNC: 146 MMOL/L (ref 136–145)
SP GR UR REFRACTOMETRY: 1.02
SPECIMEN TYPE: ABNORMAL
TROPONIN I SERPL HS-MCNC: 224 NG/L (ref 0–76)
TROPONIN I SERPL HS-MCNC: 2819 NG/L (ref 0–76)
TROPONIN I SERPL HS-MCNC: 781 NG/L (ref 0–76)
URINE CULTURE IF INDICATED: ABNORMAL
UROBILINOGEN UR QL STRIP.AUTO: 1 EU/DL (ref 0.2–1)
WBC # BLD AUTO: 14.6 K/UL (ref 4.1–11.1)
WBC # BLD AUTO: 20.9 K/UL (ref 4.1–11.1)
WBC MORPH BLD: ABNORMAL
WBC URNS QL MICRO: ABNORMAL /HPF (ref 0–4)

## 2025-06-07 PROCEDURE — 84145 PROCALCITONIN (PCT): CPT

## 2025-06-07 PROCEDURE — 2500000003 HC RX 250 WO HCPCS: Performed by: PHYSICIAN ASSISTANT

## 2025-06-07 PROCEDURE — 2580000003 HC RX 258: Performed by: STUDENT IN AN ORGANIZED HEALTH CARE EDUCATION/TRAINING PROGRAM

## 2025-06-07 PROCEDURE — 2500000003 HC RX 250 WO HCPCS: Performed by: STUDENT IN AN ORGANIZED HEALTH CARE EDUCATION/TRAINING PROGRAM

## 2025-06-07 PROCEDURE — 6370000000 HC RX 637 (ALT 250 FOR IP): Performed by: STUDENT IN AN ORGANIZED HEALTH CARE EDUCATION/TRAINING PROGRAM

## 2025-06-07 PROCEDURE — 2060000000 HC ICU INTERMEDIATE R&B

## 2025-06-07 PROCEDURE — 94640 AIRWAY INHALATION TREATMENT: CPT

## 2025-06-07 PROCEDURE — 85027 COMPLETE CBC AUTOMATED: CPT

## 2025-06-07 PROCEDURE — 85610 PROTHROMBIN TIME: CPT

## 2025-06-07 PROCEDURE — 71045 X-RAY EXAM CHEST 1 VIEW: CPT

## 2025-06-07 PROCEDURE — 96374 THER/PROPH/DIAG INJ IV PUSH: CPT

## 2025-06-07 PROCEDURE — 82803 BLOOD GASES ANY COMBINATION: CPT

## 2025-06-07 PROCEDURE — 36415 COLL VENOUS BLD VENIPUNCTURE: CPT

## 2025-06-07 PROCEDURE — 83605 ASSAY OF LACTIC ACID: CPT

## 2025-06-07 PROCEDURE — 83735 ASSAY OF MAGNESIUM: CPT

## 2025-06-07 PROCEDURE — 0202U NFCT DS 22 TRGT SARS-COV-2: CPT

## 2025-06-07 PROCEDURE — 99285 EMERGENCY DEPT VISIT HI MDM: CPT

## 2025-06-07 PROCEDURE — 96375 TX/PRO/DX INJ NEW DRUG ADDON: CPT

## 2025-06-07 PROCEDURE — 87040 BLOOD CULTURE FOR BACTERIA: CPT

## 2025-06-07 PROCEDURE — 6360000002 HC RX W HCPCS: Performed by: STUDENT IN AN ORGANIZED HEALTH CARE EDUCATION/TRAINING PROGRAM

## 2025-06-07 PROCEDURE — 5A0945A ASSISTANCE WITH RESPIRATORY VENTILATION, 24-96 CONSECUTIVE HOURS, HIGH NASAL FLOW/VELOCITY: ICD-10-PCS | Performed by: HOSPITALIST

## 2025-06-07 PROCEDURE — 84484 ASSAY OF TROPONIN QUANT: CPT

## 2025-06-07 PROCEDURE — 80053 COMPREHEN METABOLIC PANEL: CPT

## 2025-06-07 PROCEDURE — 85025 COMPLETE CBC W/AUTO DIFF WBC: CPT

## 2025-06-07 PROCEDURE — 93005 ELECTROCARDIOGRAM TRACING: CPT | Performed by: STUDENT IN AN ORGANIZED HEALTH CARE EDUCATION/TRAINING PROGRAM

## 2025-06-07 PROCEDURE — 81001 URINALYSIS AUTO W/SCOPE: CPT

## 2025-06-07 PROCEDURE — 87449 NOS EACH ORGANISM AG IA: CPT

## 2025-06-07 RX ORDER — FLUOXETINE 20 MG/5ML
20 SOLUTION ORAL DAILY
Status: DISCONTINUED | OUTPATIENT
Start: 2025-06-07 | End: 2025-06-11

## 2025-06-07 RX ORDER — VANCOMYCIN 2 G/400ML
2000 INJECTION, SOLUTION INTRAVENOUS ONCE
Status: COMPLETED | OUTPATIENT
Start: 2025-06-07 | End: 2025-06-07

## 2025-06-07 RX ORDER — ACETAMINOPHEN 650 MG/1
650 SUPPOSITORY RECTAL EVERY 6 HOURS PRN
Status: DISCONTINUED | OUTPATIENT
Start: 2025-06-07 | End: 2025-06-11

## 2025-06-07 RX ORDER — ONDANSETRON 2 MG/ML
4 INJECTION INTRAMUSCULAR; INTRAVENOUS EVERY 6 HOURS PRN
Status: DISCONTINUED | OUTPATIENT
Start: 2025-06-07 | End: 2025-06-11

## 2025-06-07 RX ORDER — SODIUM CHLORIDE 9 MG/ML
INJECTION, SOLUTION INTRAVENOUS PRN
Status: DISCONTINUED | OUTPATIENT
Start: 2025-06-07 | End: 2025-06-11

## 2025-06-07 RX ORDER — ONDANSETRON 4 MG/1
4 TABLET, ORALLY DISINTEGRATING ORAL EVERY 8 HOURS PRN
Status: DISCONTINUED | OUTPATIENT
Start: 2025-06-07 | End: 2025-06-11

## 2025-06-07 RX ORDER — METOPROLOL TARTRATE 25 MG/1
12.5 TABLET, FILM COATED ORAL 2 TIMES DAILY
Status: DISCONTINUED | OUTPATIENT
Start: 2025-06-07 | End: 2025-06-10

## 2025-06-07 RX ORDER — SODIUM CHLORIDE 0.9 % (FLUSH) 0.9 %
5-40 SYRINGE (ML) INJECTION PRN
Status: DISCONTINUED | OUTPATIENT
Start: 2025-06-07 | End: 2025-06-11

## 2025-06-07 RX ORDER — 0.9 % SODIUM CHLORIDE 0.9 %
1000 INTRAVENOUS SOLUTION INTRAVENOUS ONCE
Status: COMPLETED | OUTPATIENT
Start: 2025-06-07 | End: 2025-06-07

## 2025-06-07 RX ORDER — MAGNESIUM SULFATE IN WATER 40 MG/ML
2000 INJECTION, SOLUTION INTRAVENOUS PRN
Status: DISCONTINUED | OUTPATIENT
Start: 2025-06-07 | End: 2025-06-11

## 2025-06-07 RX ORDER — VANCOMYCIN 2 G/400ML
2000 INJECTION, SOLUTION INTRAVENOUS ONCE
Status: DISCONTINUED | OUTPATIENT
Start: 2025-06-07 | End: 2025-06-07 | Stop reason: SDUPTHER

## 2025-06-07 RX ORDER — IPRATROPIUM BROMIDE AND ALBUTEROL SULFATE 2.5; .5 MG/3ML; MG/3ML
1 SOLUTION RESPIRATORY (INHALATION)
Status: COMPLETED | OUTPATIENT
Start: 2025-06-07 | End: 2025-06-07

## 2025-06-07 RX ORDER — SODIUM CHLORIDE, SODIUM LACTATE, POTASSIUM CHLORIDE, CALCIUM CHLORIDE 600; 310; 30; 20 MG/100ML; MG/100ML; MG/100ML; MG/100ML
INJECTION, SOLUTION INTRAVENOUS CONTINUOUS
Status: DISCONTINUED | OUTPATIENT
Start: 2025-06-07 | End: 2025-06-07

## 2025-06-07 RX ORDER — METOPROLOL TARTRATE 1 MG/ML
5 INJECTION, SOLUTION INTRAVENOUS EVERY 6 HOURS PRN
Status: DISCONTINUED | OUTPATIENT
Start: 2025-06-07 | End: 2025-06-08

## 2025-06-07 RX ORDER — METRONIDAZOLE 500 MG/100ML
500 INJECTION, SOLUTION INTRAVENOUS ONCE
Status: COMPLETED | OUTPATIENT
Start: 2025-06-07 | End: 2025-06-07

## 2025-06-07 RX ORDER — ACETAMINOPHEN 325 MG/1
650 TABLET ORAL EVERY 6 HOURS PRN
Status: DISCONTINUED | OUTPATIENT
Start: 2025-06-07 | End: 2025-06-11

## 2025-06-07 RX ORDER — IPRATROPIUM BROMIDE AND ALBUTEROL SULFATE 2.5; .5 MG/3ML; MG/3ML
1 SOLUTION RESPIRATORY (INHALATION)
Status: DISCONTINUED | OUTPATIENT
Start: 2025-06-07 | End: 2025-06-08

## 2025-06-07 RX ORDER — FUROSEMIDE 10 MG/ML
20 INJECTION INTRAMUSCULAR; INTRAVENOUS ONCE
Status: COMPLETED | OUTPATIENT
Start: 2025-06-07 | End: 2025-06-07

## 2025-06-07 RX ORDER — LANSOPRAZOLE 30 MG/1
30 TABLET, ORALLY DISINTEGRATING, DELAYED RELEASE ORAL DAILY
Status: DISCONTINUED | OUTPATIENT
Start: 2025-06-08 | End: 2025-06-11

## 2025-06-07 RX ORDER — SENNA AND DOCUSATE SODIUM 50; 8.6 MG/1; MG/1
1 TABLET, FILM COATED ORAL DAILY
Status: DISCONTINUED | OUTPATIENT
Start: 2025-06-07 | End: 2025-06-11

## 2025-06-07 RX ORDER — FOLIC ACID 1 MG/1
1 TABLET ORAL DAILY
Status: DISCONTINUED | OUTPATIENT
Start: 2025-06-07 | End: 2025-06-11

## 2025-06-07 RX ORDER — POTASSIUM CHLORIDE 7.45 MG/ML
10 INJECTION INTRAVENOUS PRN
Status: DISCONTINUED | OUTPATIENT
Start: 2025-06-07 | End: 2025-06-11

## 2025-06-07 RX ORDER — POLYETHYLENE GLYCOL 3350 17 G/17G
17 POWDER, FOR SOLUTION ORAL DAILY PRN
Status: DISCONTINUED | OUTPATIENT
Start: 2025-06-07 | End: 2025-06-10

## 2025-06-07 RX ORDER — ENOXAPARIN SODIUM 100 MG/ML
40 INJECTION SUBCUTANEOUS DAILY
Status: DISCONTINUED | OUTPATIENT
Start: 2025-06-07 | End: 2025-06-08

## 2025-06-07 RX ORDER — SODIUM CHLORIDE 0.9 % (FLUSH) 0.9 %
5-40 SYRINGE (ML) INJECTION EVERY 12 HOURS SCHEDULED
Status: DISCONTINUED | OUTPATIENT
Start: 2025-06-07 | End: 2025-06-11

## 2025-06-07 RX ORDER — ONDANSETRON 2 MG/ML
4 INJECTION INTRAMUSCULAR; INTRAVENOUS ONCE
Status: COMPLETED | OUTPATIENT
Start: 2025-06-07 | End: 2025-06-07

## 2025-06-07 RX ORDER — ASPIRIN 81 MG/1
81 TABLET, CHEWABLE ORAL DAILY
Status: DISCONTINUED | OUTPATIENT
Start: 2025-06-07 | End: 2025-06-10

## 2025-06-07 RX ORDER — ATORVASTATIN CALCIUM 40 MG/1
80 TABLET, FILM COATED ORAL DAILY
Status: DISCONTINUED | OUTPATIENT
Start: 2025-06-07 | End: 2025-06-10

## 2025-06-07 RX ORDER — CARBIDOPA AND LEVODOPA 25; 100 MG/1; MG/1
1 TABLET ORAL 3 TIMES DAILY
Status: DISCONTINUED | OUTPATIENT
Start: 2025-06-07 | End: 2025-06-09

## 2025-06-07 RX ORDER — POTASSIUM CHLORIDE 1500 MG/1
40 TABLET, EXTENDED RELEASE ORAL PRN
Status: DISCONTINUED | OUTPATIENT
Start: 2025-06-07 | End: 2025-06-11

## 2025-06-07 RX ADMIN — AMIODARONE HYDROCHLORIDE 150 MG: 1.5 INJECTION, SOLUTION INTRAVENOUS at 16:30

## 2025-06-07 RX ADMIN — IPRATROPIUM BROMIDE AND ALBUTEROL SULFATE 1 DOSE: .5; 3 SOLUTION RESPIRATORY (INHALATION) at 15:28

## 2025-06-07 RX ADMIN — PIPERACILLIN AND TAZOBACTAM 4500 MG: 4; .5 INJECTION, POWDER, LYOPHILIZED, FOR SOLUTION INTRAVENOUS; PARENTERAL at 18:13

## 2025-06-07 RX ADMIN — ONDANSETRON 4 MG: 2 INJECTION, SOLUTION INTRAMUSCULAR; INTRAVENOUS at 16:23

## 2025-06-07 RX ADMIN — SODIUM CHLORIDE 1000 ML: 9 INJECTION, SOLUTION INTRAVENOUS at 14:24

## 2025-06-07 RX ADMIN — CEFTRIAXONE 1000 MG: 1 INJECTION, POWDER, FOR SOLUTION INTRAMUSCULAR; INTRAVENOUS at 14:36

## 2025-06-07 RX ADMIN — SODIUM CHLORIDE, PRESERVATIVE FREE 10 ML: 5 INJECTION INTRAVENOUS at 20:10

## 2025-06-07 RX ADMIN — IPRATROPIUM BROMIDE 0.5 MG: 0.5 SOLUTION RESPIRATORY (INHALATION) at 14:18

## 2025-06-07 RX ADMIN — METRONIDAZOLE 500 MG: 500 INJECTION, SOLUTION INTRAVENOUS at 14:43

## 2025-06-07 RX ADMIN — METOPROLOL TARTRATE 5 MG: 1 INJECTION, SOLUTION INTRAVENOUS at 22:22

## 2025-06-07 RX ADMIN — VANCOMYCIN 2000 MG: 2 INJECTION, SOLUTION INTRAVENOUS at 14:40

## 2025-06-07 RX ADMIN — FUROSEMIDE 20 MG: 10 INJECTION, SOLUTION INTRAMUSCULAR; INTRAVENOUS at 18:12

## 2025-06-07 RX ADMIN — ACETAMINOPHEN 650 MG: 650 SUPPOSITORY RECTAL at 20:02

## 2025-06-07 RX ADMIN — SODIUM CHLORIDE 1000 ML: 9 INJECTION, SOLUTION INTRAVENOUS at 13:56

## 2025-06-07 RX ADMIN — AMIODARONE HYDROCHLORIDE 0.5 MG/MIN: 1.8 INJECTION, SOLUTION INTRAVENOUS at 16:46

## 2025-06-07 RX ADMIN — PIPERACILLIN AND TAZOBACTAM 3375 MG: 3; .375 INJECTION, POWDER, LYOPHILIZED, FOR SOLUTION INTRAVENOUS at 22:22

## 2025-06-07 NOTE — PROGRESS NOTES
Pharmacy Antimicrobial Kinetic Dosing    Indication for Antimicrobials: PNA (nosocomial)     Current Regimen of Each Antimicrobial:  Vancomycin Pharmacy to Dose; Start Date ; Day # 1  Zosyn 3.375 g IV Q8H; Start Date ; Dya # 1    Previous Antimicrobial Therapy:   metronidazole IV x1   CTX x1     Goal Level: Vancomycin trough 15-20    Date Dose & Interval Measured (mcg/mL) Predicted AUC 24-48 Predicted AUC 24,ss                          Significant Cultures:    Blood, paired: in process    Labs:  Recent Labs     Units 25  1358   CREATININE MG/DL 1.98*   BUN MG/DL 49*   WBC K/uL 20.9*   BANDS % 8     Temp (24hrs), Av.8 °F (37.1 °C), Min:98.8 °F (37.1 °C), Max:98.8 °F (37.1 °C)      Conditions for Dosing Consideration:  GRACE    Creatinine Clearance (mL/min): Estimated Creatinine Clearance: 35 mL/min (A) (based on SCr of 1.98 mg/dL (H)).       Impression/Plan:   Vancomycin 2000 mg IV load administered   Pt in GRACE (scr 0.7-0.8 at baseline) - will dose vancomycin by levels for now  Level ordered for  @ 1200  Predicted [id number] = N/A, Predicted AUC24,ss = N/A  CBC & BMP ordered daily per protocol  Antimicrobial stop date 7 days     Pharmacy will follow daily and adjust medications as appropriate for renal function and/or serum levels.    Thank you,  Nurys Grimaldo [facility]

## 2025-06-07 NOTE — ED PROVIDER NOTES
[facility] EMERGENCY DEPARTMENT  EMERGENCY DEPARTMENT ENCOUNTER       Pt Name: Yogi Lynne Sr.  MRN: [id number]  Birthdate 1955  Date of evaluation: 6/7/2025  Provider: Celso Gonzalez MD   PCP: Lino Regan DO  Note Started: 4:10 PM EDT 6/7/25     CHIEF COMPLAINT       Chief Complaint   Patient presents with    Shortness of Breath     Pt BIBEMS from [facility] d/t hypotension and tachycardia, en route 's and BP 67/53. Pt oxygen saturations 86% on 2 l/min  NC which is his baseline, pt arrives on NRB. Per facility pt at mental baseline. Pt diaphoretic on arrival     Chest Pain        HISTORY OF PRESENT ILLNESS: 1 or more elements      History From: Patient and EMS  HPI Limitations: Other (clinical condition, patient's age and history of dementia)     Yogi Lynne Sr. is a 70 y.o. male who presents via EMS from [facility] for tachypnea, tachycardia, hypoxia, confusion.  EMS reports that en route to the ER the patient oxygen saturations in the upper 70s low 80s, started on nonrebreather at 15 L.  He was tachycardic with heart rates in the 180s to 190s and blood pressures that were low with reported blood pressures of 67/53.  Patient is fed by PEG tube due to dysphagia secondary to dementia, previous stroke.  Patient's family and decision makers are local and his brother is in [place].  The patient denies pain.  He reports feeling short of breath and nauseous.  History is limited.     Nursing Notes were all reviewed and agreed with or any disagreements were addressed in the HPI.     REVIEW OF SYSTEMS      Review of Systems     Positives and Pertinent negatives as per HPI.    PAST HISTORY     Past Medical History:  Past Medical History:   Diagnosis Date    Arthritis     Hiccups 4/27/2011    thorazine and referred for upper GI: duodenitis    Hx of acute poliomyelitis 1954    no residual problems    Hx of rheumatic fever     hx of rheumatic fever x 2 in childhood    Other

## 2025-06-07 NOTE — ED NOTES
Hospitalist at bedside w/ ER MD     RN will wait to complete straight cath until after vomiting is under control

## 2025-06-07 NOTE — ED NOTES
RN sent message to provider via SurveySnap:     Pt has converted and is now in Afib w/ rate in 190s - obtaining a repeat EKG

## 2025-06-07 NOTE — ED NOTES
RN sent message to provider via PagerDuty:     Repeat lactic now 2.34, pt now vomiting dark, foul smelling liquid

## 2025-06-07 NOTE — H&P
Hospitalist Admission Note    NAME:   Yogi Lynne Sr.   : 1955   MRN: [id number]     Date/Time: 2025 4:19 PM    Patient PCP: Lino Regan, DO    ______________________________________________________________________  Given the patient's current clinical presentation, I have a high level of concern for decompensation if discharged from the emergency department.  Complex decision making was performed, which includes reviewing the patient's available past medical records, laboratory results, and x-ray films.       My assessment of this patient's clinical condition and my plan of care is as follows.    Assessment / Plan:        Severe sepsis possibly secondary to aspiration pneumonia versus community-acquired pneumonia  Acute hypoxic respiratory failure  Lactic acidosis  Leukocytosis    Bilateral lung infiltrates, right greater than left.   Lactic acid 5.11---> 2.34  On new oxygen demand of 15 L HF   Imaging sepsis criteria based on respiratory rate in 30s, heart rate in 200s, new oxygen to monitor, hypotension with blood pressure in 80s/60s, elevated lactic acid to 5.11, and WBC 20.9 K    Plan   Admit to the stepdown unit  Status post vancomycin and ceftriaxone, and metronidazole in the emergency department  Due to severe sepsis, will continue vancomycin and will start on Zosyn  Trend lactic acid every 6 hours until back to normal   will be careful with the fluid because of concern of pulmonary edema  Trial of lasix 20 IV x 1   DuoNebs every 4 hours  Chest physiotherapy  Continue on supplemental oxygen, with goal above 92%  Daily CBC to trend leukocytosis  Ordered respiratory virus panel, Pro-Jose, Legionella, sputum culture, strep pneumo  Will keep patient n.p.o.  Order SLP, PT, OT   Follow-up on blood cultures  Fu on blood cultures           A-fib with RVR  Possibly due to underlying severe sepsis  Reviewed EKG  Continue on telemetry monitoring  Amiodarone load  Cardiology consult placed      Acute  (!) 158/142 98.8 °F (37.1 °C) Oral (!) 192 (!) 37 -- -- --   25 1345 -- -- -- (!) 193 22 -- 1.778 m (5' 10\") 79.4 kg (175 lb)       Temp (24hrs), Av.8 °F (37.1 °C), Min:98.8 °F (37.1 °C), Max:98.8 °F (37.1 °C)      O2 Flow Rate (L/min): 6 L/min O2 Device: Nasal cannula    Wt Readings from Last 12 Encounters:   25 79.4 kg (175 lb)   25 82.7 kg (182 lb 5.1 oz)   25 84.2 kg (185 lb 10 oz)         PHYSICAL EXAM:  General:    Alert, cooperative, in no acute distress, tachypnea, on nasal cannula. A & o x 2   Lungs:   Coarse crackles all over the chest  Chest wall:  No tenderness.  No accessory muscle use.  Heart:   Regular  rhythm,  No  Murmur.   No edema  Abdomen:   Soft, NT. ND  BS+, PEG tube in place  Extremities: No cyanosis.  No clubbing,      Skin turgor normal, Radial dial pulse 2+. Capillary refill normal        LAB DATA REVIEWED:          XR CHEST PORTABLE  Result Date: 2025  EXAM:  XR CHEST PORTABLE INDICATION: verbal. Cardiac arrhythmia. COMPARISON: 2025 TECHNIQUE: portable chest AP view FINDINGS: Heart size is stable. The pulmonary vasculature is within normal limits. Lung volumes are moderate with significant acute interstitial parenchymal opacity throughout the right midlung field and slightly less severe in the left perihilar lung field. The visualized bones and upper abdomen are age-appropriate.     Bilateral lung infiltrates, right greater than left. Correlate for infection versus asymmetric pulmonary edema.. Electronically signed by ISAAK GUZMAN     _______________________________________________________________________    TOTAL TIME:  76 Minutes    Critical Care Provided     Minutes non procedure based    Signed: Astrid Martinez MD    Procedures: see electronic medical records for all procedures/Xrays and details which were not copied into this note but were reviewed prior to creation of Plan.

## 2025-06-07 NOTE — PROGRESS NOTES
Spoke to Dr. Martinez about concern whether this patient is appropriate for a stepdown floor. Dr. Martinez felt that HR in 120's was fine to be on stepdown. Will watch resp. status for any further deterioration and call ICU if worsens but for now is ok to be on stepdown.

## 2025-06-07 NOTE — ED NOTES
RN attempted to call report, inpatient RN to call back due to being in a patient room at this time

## 2025-06-07 NOTE — PROGRESS NOTES
1747  Pt received from Ed.  Patient alert to self at this time.  Pt vomitting up small amounts of dark brown/black  liquid in which he was also doing in the ED per per ED RN. Will hold lovenox d/t not knowing if this is blood. Pt on amiodorone gtt and HR in 130's.  Pt is a very poor historian and is unable to voice if he is in any pain.  Patient just states that he feels \"bad\".  Rapid response RN notified to come take a look at patient so he is on their radar.  Patient assessed, and VS taken at this time.  Pt also on 15 liters of Oxygen at this time.  Patient states that he is \"nervous\".     Unable to do admission database at this time due to patient being a poor historian.     1900  Notified MD of patients current VS.  Also, this RN wanted to make sure Md was aware of patient vomiting a dark liquid substance that possibly could be old blood.  Md's response is to hold lovenox as well as to monitor vital signs, and if they are to get any worse to reach out to night hospitalist team In case of possible need to transfer to ICU. Gi consult also put in per MD.    1915  Report given to Nam Edgar.  Night RN is aware of all of the events that went on in the short time that this RN has had this patient.

## 2025-06-07 NOTE — ED NOTES
TRANSFER - OUT REPORT:    Verbal report given to GUEVARA Cid on Yogi Lynne Sr.  being transferred to 2112 for routine progression of patient care       Report consisted of patient's Situation, Background, Assessment and   Recommendations(SBAR).     Information from the following report(s) Nurse Handoff Report, Index, ED Encounter Summary, ED SBAR, MAR, Recent Results, Med Rec Status, and Neuro Assessment was reviewed with the receiving nurse.    [place] Fall Assessment:    Presents to emergency department  because of falls (Syncope, seizure, or loss of consciousness): No  Age > 70: Yes  Altered Mental Status, Intoxication with alcohol or substance confusion (Disorientation, impaired judgment, poor safety awaremess, or inability to follow instructions): No  Impaired Mobility: Ambulates or transfers with assistive devices or assistance; Unable to ambulate or transer.: Yes (wheelchair)  Nursing Judgement: Yes          Lines:   Peripheral IV 06/07/25 Posterior;Right Forearm (Active)   Site Assessment Clean, dry & intact 06/07/25 1354   Line Status Blood return noted 06/07/25 1354   Line Care Cap changed 06/07/25 1354   Phlebitis Assessment No symptoms 06/07/25 1354   Infiltration Assessment 0 06/07/25 1354       Peripheral IV 06/07/25 Left Cephalic (Active)        Opportunity for questions and clarification was provided.      Patient transported with:  Monitor and Registered Nurse    Inpatient RN to come transport pt

## 2025-06-07 NOTE — PROGRESS NOTES
1900 Bedside and Verbal shift change report given to Herve WATERMAN (oncoming nurse) by Janie (offgoing nurse). Report included the following information Nurse Handoff Report, MAR, Recent Results, and Cardiac Rhythm S Tachy . Pt on Amnio drip.     1955 Informed by AM RN that she had contacted Dr. Martinez and was informed that if pt gets worst to contact intensivist Elizabeth Palmer for possible transfer to ICU.    2018 Pt o2 dropping to <88%, RT informed and will come to check pt.    2108 Pt HR is still on 120s-140s. Alise RRT RN informed and was instructed to inform hospitalist for further management. Deedee LAGUNA informed(New orders given).    2230 Blood collected and orders carried out.    2339 Critical result for Lactic acid plasma which is 2.5 (before POC LA 5.11 & 2.34) and troponin at 2,819 (before 781). Deedee LAGUNA informed.    0130 Rapid Response Called for HR that is sustaining 190s. Pt was seen by Deedee LAGUNA and Dr. Beatty. Blood collection, CT chest, potassium 10 Meqs ordered and diltiazem 20mg IV stat given. Amiodarone drip was change to Diltiazem drip(titratable).    0230 Critical for troponin at 2,611 was informed to Deedee LAGUNA.    0316 Ct chest done.    0530 Critical for troponin 2,367 and Lactic acid 2.5 was informed to Deedee LAGUNA. Also informed that result for CT is available.    0700 Bedside shift change report given to Anne WATERMAN (oncoming nurse) by Herve Loza RN (offgoing nurse).  Report included the following information SBAR, MAR, Recent Results, and Cardiac Rhythm SVT    Conrado Score 13. All of the following interventions have been implemented to prevent pressure injury:    SKIN ASSESSMENT (S)  Dual skin assessment completed at shift change: Yes  Name of second RN who completed Dual Skin Assessment: Janie WATERMAN  Picture of wound uploaded to EMR: Yes  Wound added as LDA in [place]: Yes  Venelex ordered for stage 1 or greater pressure injuries ONLY: Yes  Wound care consulted if wounds

## 2025-06-08 ENCOUNTER — APPOINTMENT (OUTPATIENT)
Facility: HOSPITAL | Age: 70
DRG: 871 | End: 2025-06-08
Payer: MEDICARE

## 2025-06-08 LAB
ALBUMIN SERPL-MCNC: 2.3 G/DL (ref 3.5–5)
ANION GAP SERPL CALC-SCNC: 8 MMOL/L (ref 2–12)
B PERT DNA SPEC QL NAA+PROBE: NOT DETECTED
BASOPHILS # BLD: 0 K/UL (ref 0–0.1)
BASOPHILS NFR BLD: 0 % (ref 0–1)
BORDETELLA PARAPERTUSSIS BY PCR: NOT DETECTED
BUN SERPL-MCNC: 44 MG/DL (ref 6–20)
BUN/CREAT SERPL: 32 (ref 12–20)
C PNEUM DNA SPEC QL NAA+PROBE: NOT DETECTED
CALCIUM SERPL-MCNC: 9.8 MG/DL (ref 8.5–10.1)
CHLORIDE SERPL-SCNC: 116 MMOL/L (ref 97–108)
CO2 SERPL-SCNC: 24 MMOL/L (ref 21–32)
CREAT SERPL-MCNC: 1.38 MG/DL (ref 0.7–1.3)
DIFFERENTIAL METHOD BLD: ABNORMAL
EKG ATRIAL RATE: 208 BPM
EKG DIAGNOSIS: NORMAL
EKG DIAGNOSIS: NORMAL
EKG P AXIS: -20 DEGREES
EKG Q-T INTERVAL: 252 MS
EKG Q-T INTERVAL: 258 MS
EKG QRS DURATION: 102 MS
EKG QRS DURATION: 94 MS
EKG QTC CALCULATION (BAZETT): 393 MS
EKG QTC CALCULATION (BAZETT): 446 MS
EKG R AXIS: -29 DEGREES
EKG R AXIS: -35 DEGREES
EKG T AXIS: 144 DEGREES
EKG T AXIS: 152 DEGREES
EKG VENTRICULAR RATE: 140 BPM
EKG VENTRICULAR RATE: 189 BPM
EOSINOPHIL # BLD: 0 K/UL (ref 0–0.4)
EOSINOPHIL NFR BLD: 0 % (ref 0–7)
ERYTHROCYTE [DISTWIDTH] IN BLOOD BY AUTOMATED COUNT: 14.4 % (ref 11.5–14.5)
FLUAV SUBTYP SPEC NAA+PROBE: NOT DETECTED
FLUBV RNA SPEC QL NAA+PROBE: NOT DETECTED
GLUCOSE SERPL-MCNC: 99 MG/DL (ref 65–100)
HADV DNA SPEC QL NAA+PROBE: NOT DETECTED
HCOV 229E RNA SPEC QL NAA+PROBE: NOT DETECTED
HCOV HKU1 RNA SPEC QL NAA+PROBE: NOT DETECTED
HCOV NL63 RNA SPEC QL NAA+PROBE: NOT DETECTED
HCOV OC43 RNA SPEC QL NAA+PROBE: NOT DETECTED
HCT VFR BLD AUTO: 40.3 % (ref 36.6–50.3)
HGB BLD-MCNC: 12.7 G/DL (ref 12.1–17)
HMPV RNA SPEC QL NAA+PROBE: NOT DETECTED
HPIV1 RNA SPEC QL NAA+PROBE: NOT DETECTED
HPIV2 RNA SPEC QL NAA+PROBE: NOT DETECTED
HPIV3 RNA SPEC QL NAA+PROBE: NOT DETECTED
HPIV4 RNA SPEC QL NAA+PROBE: NOT DETECTED
IMM GRANULOCYTES # BLD AUTO: 0 K/UL (ref 0–0.04)
IMM GRANULOCYTES NFR BLD AUTO: 0 % (ref 0–0.5)
LACTATE SERPL-SCNC: 1.8 MMOL/L (ref 0.4–2)
LACTATE SERPL-SCNC: 2.5 MMOL/L (ref 0.4–2)
LYMPHOCYTES # BLD: 1.72 K/UL (ref 0.8–3.5)
LYMPHOCYTES NFR BLD: 10 % (ref 12–49)
M PNEUMO DNA SPEC QL NAA+PROBE: NOT DETECTED
MAGNESIUM SERPL-MCNC: 2 MG/DL (ref 1.6–2.4)
MAGNESIUM SERPL-MCNC: 2 MG/DL (ref 1.6–2.4)
MCH RBC QN AUTO: 30.8 PG (ref 26–34)
MCHC RBC AUTO-ENTMCNC: 31.5 G/DL (ref 30–36.5)
MCV RBC AUTO: 97.8 FL (ref 80–99)
MONOCYTES # BLD: 0.34 K/UL (ref 0–1)
MONOCYTES NFR BLD: 2 % (ref 5–13)
NEUTS BAND NFR BLD MANUAL: 34 %
NEUTS SEG # BLD: 15.14 K/UL (ref 1.8–8)
NEUTS SEG NFR BLD: 54 % (ref 32–75)
NRBC # BLD: 0 K/UL (ref 0–0.01)
NRBC BLD-RTO: 0 PER 100 WBC
PHOSPHATE SERPL-MCNC: 3.5 MG/DL (ref 2.6–4.7)
PLATELET # BLD AUTO: 368 K/UL (ref 150–400)
PMV BLD AUTO: 10.7 FL (ref 8.9–12.9)
POTASSIUM SERPL-SCNC: 3.6 MMOL/L (ref 3.5–5.1)
RBC # BLD AUTO: 4.12 M/UL (ref 4.1–5.7)
RBC MORPH BLD: ABNORMAL
RSV RNA SPEC QL NAA+PROBE: NOT DETECTED
RV+EV RNA SPEC QL NAA+PROBE: NOT DETECTED
SARS-COV-2 RNA RESP QL NAA+PROBE: NOT DETECTED
SODIUM SERPL-SCNC: 148 MMOL/L (ref 136–145)
TROPONIN I SERPL HS-MCNC: 2038 NG/L (ref 0–76)
TROPONIN I SERPL HS-MCNC: 2327 NG/L (ref 0–76)
TROPONIN I SERPL HS-MCNC: 2611 NG/L (ref 0–76)
VANCOMYCIN SERPL-MCNC: 10.3 UG/ML
WBC # BLD AUTO: 17.2 K/UL (ref 4.1–11.1)
WBC MORPH BLD: ABNORMAL

## 2025-06-08 PROCEDURE — 6370000000 HC RX 637 (ALT 250 FOR IP): Performed by: PHYSICIAN ASSISTANT

## 2025-06-08 PROCEDURE — 2580000003 HC RX 258: Performed by: STUDENT IN AN ORGANIZED HEALTH CARE EDUCATION/TRAINING PROGRAM

## 2025-06-08 PROCEDURE — 2500000003 HC RX 250 WO HCPCS: Performed by: STUDENT IN AN ORGANIZED HEALTH CARE EDUCATION/TRAINING PROGRAM

## 2025-06-08 PROCEDURE — 83605 ASSAY OF LACTIC ACID: CPT

## 2025-06-08 PROCEDURE — 6360000002 HC RX W HCPCS: Performed by: PHYSICIAN ASSISTANT

## 2025-06-08 PROCEDURE — 2580000003 HC RX 258: Performed by: PHYSICIAN ASSISTANT

## 2025-06-08 PROCEDURE — 80069 RENAL FUNCTION PANEL: CPT

## 2025-06-08 PROCEDURE — 2060000000 HC ICU INTERMEDIATE R&B

## 2025-06-08 PROCEDURE — 2580000003 HC RX 258: Performed by: INTERNAL MEDICINE

## 2025-06-08 PROCEDURE — 85025 COMPLETE CBC W/AUTO DIFF WBC: CPT

## 2025-06-08 PROCEDURE — 36415 COLL VENOUS BLD VENIPUNCTURE: CPT

## 2025-06-08 PROCEDURE — 6370000000 HC RX 637 (ALT 250 FOR IP): Performed by: STUDENT IN AN ORGANIZED HEALTH CARE EDUCATION/TRAINING PROGRAM

## 2025-06-08 PROCEDURE — 84484 ASSAY OF TROPONIN QUANT: CPT

## 2025-06-08 PROCEDURE — 6360000002 HC RX W HCPCS: Performed by: INTERNAL MEDICINE

## 2025-06-08 PROCEDURE — 2700000000 HC OXYGEN THERAPY PER DAY

## 2025-06-08 PROCEDURE — 6360000004 HC RX CONTRAST MEDICATION: Performed by: PHYSICIAN ASSISTANT

## 2025-06-08 PROCEDURE — 2500000003 HC RX 250 WO HCPCS: Performed by: PHYSICIAN ASSISTANT

## 2025-06-08 PROCEDURE — 83735 ASSAY OF MAGNESIUM: CPT

## 2025-06-08 PROCEDURE — 2500000003 HC RX 250 WO HCPCS

## 2025-06-08 PROCEDURE — 71275 CT ANGIOGRAPHY CHEST: CPT

## 2025-06-08 PROCEDURE — 6360000002 HC RX W HCPCS: Performed by: STUDENT IN AN ORGANIZED HEALTH CARE EDUCATION/TRAINING PROGRAM

## 2025-06-08 PROCEDURE — 80202 ASSAY OF VANCOMYCIN: CPT

## 2025-06-08 RX ORDER — DILTIAZEM HYDROCHLORIDE 5 MG/ML
INJECTION INTRAVENOUS
Status: COMPLETED
Start: 2025-06-08 | End: 2025-06-08

## 2025-06-08 RX ORDER — POTASSIUM CHLORIDE 7.45 MG/ML
10 INJECTION INTRAVENOUS ONCE
Status: COMPLETED | OUTPATIENT
Start: 2025-06-08 | End: 2025-06-08

## 2025-06-08 RX ORDER — DEXTROSE MONOHYDRATE 50 MG/ML
INJECTION, SOLUTION INTRAVENOUS CONTINUOUS
Status: DISCONTINUED | OUTPATIENT
Start: 2025-06-08 | End: 2025-06-08

## 2025-06-08 RX ORDER — SODIUM CHLORIDE, SODIUM LACTATE, POTASSIUM CHLORIDE, CALCIUM CHLORIDE 600; 310; 30; 20 MG/100ML; MG/100ML; MG/100ML; MG/100ML
INJECTION, SOLUTION INTRAVENOUS CONTINUOUS
Status: DISCONTINUED | OUTPATIENT
Start: 2025-06-08 | End: 2025-06-08

## 2025-06-08 RX ORDER — ENOXAPARIN SODIUM 100 MG/ML
1 INJECTION SUBCUTANEOUS 2 TIMES DAILY
Status: DISCONTINUED | OUTPATIENT
Start: 2025-06-08 | End: 2025-06-11

## 2025-06-08 RX ORDER — LEVALBUTEROL INHALATION SOLUTION 1.25 MG/3ML
1.25 SOLUTION RESPIRATORY (INHALATION) EVERY 8 HOURS PRN
Status: DISCONTINUED | OUTPATIENT
Start: 2025-06-08 | End: 2025-06-11

## 2025-06-08 RX ORDER — SODIUM CHLORIDE, SODIUM LACTATE, POTASSIUM CHLORIDE, CALCIUM CHLORIDE 600; 310; 30; 20 MG/100ML; MG/100ML; MG/100ML; MG/100ML
INJECTION, SOLUTION INTRAVENOUS CONTINUOUS
Status: DISCONTINUED | OUTPATIENT
Start: 2025-06-08 | End: 2025-06-09

## 2025-06-08 RX ORDER — SODIUM CHLORIDE, SODIUM LACTATE, POTASSIUM CHLORIDE, AND CALCIUM CHLORIDE .6; .31; .03; .02 G/100ML; G/100ML; G/100ML; G/100ML
500 INJECTION, SOLUTION INTRAVENOUS ONCE
Status: COMPLETED | OUTPATIENT
Start: 2025-06-08 | End: 2025-06-08

## 2025-06-08 RX ORDER — METOPROLOL TARTRATE 1 MG/ML
2.5 INJECTION, SOLUTION INTRAVENOUS EVERY 6 HOURS PRN
Status: DISCONTINUED | OUTPATIENT
Start: 2025-06-08 | End: 2025-06-11

## 2025-06-08 RX ORDER — DILTIAZEM HYDROCHLORIDE 5 MG/ML
20 INJECTION INTRAVENOUS ONCE
Status: COMPLETED | OUTPATIENT
Start: 2025-06-08 | End: 2025-06-08

## 2025-06-08 RX ORDER — SODIUM CHLORIDE, SODIUM LACTATE, POTASSIUM CHLORIDE, AND CALCIUM CHLORIDE .6; .31; .03; .02 G/100ML; G/100ML; G/100ML; G/100ML
250 INJECTION, SOLUTION INTRAVENOUS ONCE
Status: COMPLETED | OUTPATIENT
Start: 2025-06-08 | End: 2025-06-08

## 2025-06-08 RX ORDER — IOPAMIDOL 755 MG/ML
100 INJECTION, SOLUTION INTRAVASCULAR
Status: COMPLETED | OUTPATIENT
Start: 2025-06-08 | End: 2025-06-08

## 2025-06-08 RX ORDER — 0.9 % SODIUM CHLORIDE 0.9 %
500 INTRAVENOUS SOLUTION INTRAVENOUS ONCE
Status: DISCONTINUED | OUTPATIENT
Start: 2025-06-08 | End: 2025-06-08

## 2025-06-08 RX ORDER — METOPROLOL TARTRATE 1 MG/ML
5 INJECTION, SOLUTION INTRAVENOUS EVERY 5 MIN PRN
Status: DISCONTINUED | OUTPATIENT
Start: 2025-06-08 | End: 2025-06-11

## 2025-06-08 RX ADMIN — FLUOXETINE 20 MG: 20 LIQUID ORAL at 08:59

## 2025-06-08 RX ADMIN — SODIUM CHLORIDE, PRESERVATIVE FREE 10 ML: 5 INJECTION INTRAVENOUS at 20:12

## 2025-06-08 RX ADMIN — AMIODARONE HYDROCHLORIDE 1 MG/MIN: 50 INJECTION, SOLUTION INTRAVENOUS at 11:51

## 2025-06-08 RX ADMIN — ENOXAPARIN SODIUM 80 MG: 100 INJECTION SUBCUTANEOUS at 02:31

## 2025-06-08 RX ADMIN — SODIUM CHLORIDE, SODIUM LACTATE, POTASSIUM CHLORIDE, AND CALCIUM CHLORIDE: .6; .31; .03; .02 INJECTION, SOLUTION INTRAVENOUS at 10:21

## 2025-06-08 RX ADMIN — CARBIDOPA AND LEVODOPA 1 TABLET: 25; 100 TABLET ORAL at 20:07

## 2025-06-08 RX ADMIN — SODIUM CHLORIDE, SODIUM LACTATE, POTASSIUM CHLORIDE, AND CALCIUM CHLORIDE: .6; .31; .03; .02 INJECTION, SOLUTION INTRAVENOUS at 19:15

## 2025-06-08 RX ADMIN — ENOXAPARIN SODIUM 80 MG: 100 INJECTION SUBCUTANEOUS at 08:59

## 2025-06-08 RX ADMIN — DEXTROSE: 5 SOLUTION INTRAVENOUS at 08:12

## 2025-06-08 RX ADMIN — PIPERACILLIN AND TAZOBACTAM 3375 MG: 3; .375 INJECTION, POWDER, LYOPHILIZED, FOR SOLUTION INTRAVENOUS at 22:42

## 2025-06-08 RX ADMIN — ENOXAPARIN SODIUM 80 MG: 100 INJECTION SUBCUTANEOUS at 20:08

## 2025-06-08 RX ADMIN — VANCOMYCIN HYDROCHLORIDE 1000 MG: 1 INJECTION, POWDER, LYOPHILIZED, FOR SOLUTION INTRAVENOUS at 14:40

## 2025-06-08 RX ADMIN — DILTIAZEM HYDROCHLORIDE 20 MG: 5 INJECTION INTRAVENOUS at 01:37

## 2025-06-08 RX ADMIN — POTASSIUM CHLORIDE 10 MEQ: 7.46 INJECTION, SOLUTION INTRAVENOUS at 01:49

## 2025-06-08 RX ADMIN — DOCUSATE SODIUM 50 MG AND SENNOSIDES 8.6 MG 1 TABLET: 8.6; 5 TABLET, FILM COATED ORAL at 08:59

## 2025-06-08 RX ADMIN — PIPERACILLIN AND TAZOBACTAM 3375 MG: 3; .375 INJECTION, POWDER, LYOPHILIZED, FOR SOLUTION INTRAVENOUS at 17:55

## 2025-06-08 RX ADMIN — SODIUM CHLORIDE, PRESERVATIVE FREE 10 ML: 5 INJECTION INTRAVENOUS at 09:08

## 2025-06-08 RX ADMIN — IOPAMIDOL 100 ML: 755 INJECTION, SOLUTION INTRAVENOUS at 03:17

## 2025-06-08 RX ADMIN — SODIUM CHLORIDE 5 MG/HR: 900 INJECTION, SOLUTION INTRAVENOUS at 01:51

## 2025-06-08 RX ADMIN — DILTIAZEM HYDROCHLORIDE 20 MG: 5 INJECTION, SOLUTION INTRAVENOUS at 01:37

## 2025-06-08 RX ADMIN — SODIUM CHLORIDE, SODIUM LACTATE, POTASSIUM CHLORIDE, AND CALCIUM CHLORIDE 250 ML: .6; .31; .03; .02 INJECTION, SOLUTION INTRAVENOUS at 13:26

## 2025-06-08 RX ADMIN — AMIODARONE HYDROCHLORIDE 0.5 MG/MIN: 50 INJECTION, SOLUTION INTRAVENOUS at 17:59

## 2025-06-08 RX ADMIN — METOPROLOL TARTRATE 5 MG: 1 INJECTION, SOLUTION INTRAVENOUS at 13:26

## 2025-06-08 RX ADMIN — ACETAMINOPHEN 650 MG: 325 TABLET ORAL at 12:14

## 2025-06-08 RX ADMIN — CARBIDOPA AND LEVODOPA 1 TABLET: 25; 100 TABLET ORAL at 14:30

## 2025-06-08 RX ADMIN — PIPERACILLIN AND TAZOBACTAM 3375 MG: 3; .375 INJECTION, POWDER, LYOPHILIZED, FOR SOLUTION INTRAVENOUS at 05:25

## 2025-06-08 RX ADMIN — METOPROLOL TARTRATE 12.5 MG: 25 TABLET ORAL at 20:08

## 2025-06-08 RX ADMIN — SODIUM CHLORIDE, SODIUM LACTATE, POTASSIUM CHLORIDE, AND CALCIUM CHLORIDE 500 ML: .6; .31; .03; .02 INJECTION, SOLUTION INTRAVENOUS at 01:11

## 2025-06-08 NOTE — CONSULTS
Occupational Therapy   Treatment    Name: Dejuan Correa  MRN: [id number]  Admitting Diagnosis:  Cardioembolic stroke       Recommendations:     Discharge Recommendations: other (see comments) (High intensity occupational therapy)  Discharge Equipment Recommendations:  other (see comments) (Defer to post acute care)  Barriers to discharge:  Other (Comment) (unable to ambulate; high fall risk; assist for all ADLs)    Assessment:     Dejuan Correa is a 76 y.o. male with a medical diagnosis of Cardioembolic stroke.  He presents with ..The primary encounter diagnosis was Cerebellar stroke. Diagnoses of Stroke, Acute CVA (cerebrovascular accident), and Cardioembolic stroke were also pertinent to this visit. . Performance deficits affecting function are weakness, impaired endurance, impaired self care skills, impaired functional mobility, decreased coordination, impaired balance, gait instability, decreased upper extremity function, decreased lower extremity function, decreased safety awareness, pain, abnormal tone, edema, impaired fine motor, impaired coordination, decreased ROM.     Continue OT POC. Moderate progression noted. Improved bed mobility and sitting balance EOB as evidenced by minimal assist x1 person for supine to sit and stand by assist for sitting EOB. Met self-feeding goal. Deficits in sit to stand and static standing balance as noted by moderate assist of two persons for 2 stands with RW and 2 stands with no assistive device; grimacing noted due to pain in left knee and ankle. Recommending high intensity occupational therapy.     Rehab Prognosis:  Good; patient would benefit from acute skilled OT services to address these deficits and reach maximum level of function.       Plan:     Patient to be seen 5 x/week to address the above listed problems via self-care/home management, therapeutic activities, therapeutic exercises, neuromuscular re-education  Plan of Care Expires: 07/03/23  Plan of Care Reviewed  This patient has been seen by [facility].  Consult received, full note to follow.   with: patient    Subjective     Chief Complaint: Patient endorses continued pain from Left knee to ankle  Patient/Family Comments/goals: Patient is very motivated to work hard so he can improve.   Pain/Comfort:  Pain Rating 1:  (6/10 pain from Left knee to ankle)  Pain Addressed 1: Nurse notified  Pain Rating Post-Intervention 1: other (see comments) (No change reported)    Objective:     Communicated with: nursing prior to session.  Patient found HOB elevated with bed alarm, telemetry, Condom Catheter upon OT entry to room.    General Precautions: Standard, fall    Orthopedic Precautions:N/A  Braces: N/A  Respiratory Status: Room air     Occupational Performance:     Bed Mobility:    Patient completed Rolling/Turning to Right with minimum assistance  Patient completed Supine to Sit with minimum assistance and with leg lift  Patient completed Sit to Supine with minimum assistance and with leg lift     Functional Mobility/Transfers:  Patient completed Sit <> Stand Transfer with moderate assistance and of 2 persons  with  rolling walker   Functional Mobility: Performed sit to stand twice with RW with moderate assist x2 persons; sit to stand twice with no assistive device and hand held assist. Performed four scoots to the right towards HOB with moderate assist x1 person.    Activities of Daily Living:  Feeding:  set up assist ; drinking and self-feeding on exit with implementing strategy to help improve swallowing. Teachback to CNA on feeding strategy for carryover. Moderate improvement with swallowing noted as evidenced by decreasing diet to dysphagia Soft with thin liquids.   Upper Body Dressing: minimum assistance        [facility] 6 Click ADL: 15    Treatment & Education:  Patient agreeable to OT session. LUE PROM and stretching performed. Rated 1 (slight increase in tone and catch at end range) on Modified [place]. Patient guided to EOB. Rolling to right with minimum assist and cueing for using strong leg to hook and  bring LLE to EOB; able to move LLE half way to edge with minimum assist for second half. Supine to sit with minimum assist. Left shoulder flexion 2+/5; Left elbow flexion 3-/5; Left elbow extension 2+/5. Reaching activity to cross midline with verbal/tactile cueing and tapping of triceps. Two stands with Moderate assist x2 persons with RW and two stands with moderate assist x2 persons without AD and with hand-held assist. Flexed at hip and lateral lean to left noted on all standing attempts. Returned to bed. Performed scoots to right with moderate assist x1 person. Sit to supine performed with minimal assist and leg lift; cueing and attempt to hook left leg but unable to on this attempt. Self-drinking and feeding observed with set up assist as above.       Patient left HOB elevated with all lines intact, call button in reach, bed alarm on, and nursing notified    GOALS:   Multidisciplinary Problems       Occupational Therapy Goals          Problem: Occupational Therapy    Goal Priority Disciplines Outcome Interventions   Occupational Therapy Goal     OT, PT/OT Ongoing, Progressing    Description: Goals to be met by: 7/3/2023     Patient will increase functional independence with ADLs by performing:    Self feeding with distant supervision in upright seated position with adaptations as needed (goal added 6/6/2023). Goal Met.  LE Dressing with Contact Guard Assistance with increased time. (downgrade goal to moderate assist 6/8/2023)  Toileting from toilet with Modified independence and increased time for hygiene and clothing management. (Downgrade goal to moderate assist 6/8/2023)  Step transfer with Stand-by Assistance and AD as needed. (Downgrade goal to moderate assist 6/8/2023)  Toilet transfer to toilet with Stand-by Assistance and AD as needed. (Downgrade goal to moderate assist to drop arm commode 6/8/2023)  Increase LUE proximal strength grossly to 3+/5.  100% reciprocation for patient education, CVA/BEFAST  education, ADL/Mobility modifications, and visual scanning as appropriate.                          Time Tracking:     OT Date of Treatment: 06/12/23  OT Start Time: 0959  OT Stop Time: 1044  OT Total Time (min): 45 min    Billable Minutes:Self Care/Home Management 15 min  Therapeutic Activity 15 min  Neuromuscular Re-education 15 min    OT/ARNALDO: OT          6/12/2023

## 2025-06-08 NOTE — SIGNIFICANT EVENT
1319 Rapid Response called for Fast Heart Rate    1322  Arrived to room patient awake alert diaphoretic on Midflow O2   Rapid A-Flutter  EKG being obtained by Primary Nurse.  Dr Sparks responded.  After EKG order for Metoprolol 5 mg to be given.     1326  IV Metoprolol given by Unit Charge nurse see MAR     1332  HR now 93 A-flutter 3:1 noted  Verbal order for  bolus over 30 minutes to be given.  No other orders at this time.    1335  Patient speaking with Dr. Sparks stating he has pain in legs.  He told MD he could call his family in [place].    To note;  Cardiology changed patient from Diltiazem gtt back to Amio gtt at 1130 today.  Dr Sparks did state can give rectal tylenol PRN for fever.  Patient does have Peg tube in place.  Rapid response was called during night shift on this patient.  No orders to move patient at this time.      Rapid Response team  REGLA VincentN, RN  [phone]

## 2025-06-08 NOTE — PROGRESS NOTES
Chest physical therapy was ordered- RT discussed with Dr. Sparks and came to the conclusion that it would be inappropriate for the patient's situation. Patient's HR has been unstable throughout the day and last night. Dr. BECERRA agreed to discontinue CPT for the time being.
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
## 2025-06-08 NOTE — PROGRESS NOTES
Respiratory Therapy RRT response    Patient  Yogi Lynne Sr.     70 y.o.   male     6/8/2025  1:21 PM      Reason for RRT: Responded to rapid response, RT not needed. Called for elevated heart rate    Problem List:   Patient Active Problem List   Diagnosis    Hyperlipidemia    Postpolio muscular atrophy (HCC)    H/O colonoscopy with polypectomy    SIRS (systemic inflammatory response syndrome) (HCC)    Vomiting    Hx of rheumatic heart disease    Excessive drinking alcohol    Hypercholesterolemia with hypertriglyceridemia    GIB (gastrointestinal bleeding)    Acute hypoxemic respiratory failure (HCC)    CAP (community acquired pneumonia) due to Chlamydia species    Sepsis (HCC)    Severe sepsis (HCC)                Oxygen:   15L non heated high flow        SpO2:   97   with oxygen                Comments:     Respiratory Therapist: America Moise RCP

## 2025-06-08 NOTE — PROGRESS NOTES
0700: Bedside and Verbal shift change report given to Anne WATERMAN (oncoming nurse) by Herve WATERMAN (offgoing nurse). Report included the following information Nurse Handoff Report, Index, Intake/Output, MAR, Recent Results, and Cardiac Rhythm AFIB . Dilt drip currently running at 7.5 mg/hr. Per night shift RN, MD stated to hold lovenox due to vomiting of possible blood yesterday. RN confirmed this in note from yesterday, however lovenox given per night shift RN this morning. Bridger MCKEE notified.    0754: RT at bedside to give breathing treatment, RN hesitant for DuoNeb due to possible side effects, increasing HR. MD messaged for alternative. Order placed for ipratropium. Also per MD, if HgB stable, okay to continue enoxaparin.     1255: Callback received from [facility], pt. Has been seen by Kaylie Oliveira. RN to consult.     1258: Stroud with GI at bedside, per MD, pt. Not a candidate for EGD at this time. Please refer to his note.     1320: RR called for AFIB RVR, HR 150s and sustaining. Pt. C/o feeling \"lightheaded\". /43 initally, rechecked and found 108/41. O2 96% on 15L HFNC,RR 32, temp 100.1 (pt. Previously received PRN tylenol for this. Charge RN, RR RN, RT, nursing supervisor and Bridger MCKEE at bedside. EKG performed, one time dose IV metoprolol 5mg given at 1326 and 500 mL LR bolus given per MD order.     1349: Pt. HR now 80s-90, periodically converting to NSR and then returning to AFIB.     End of Shift Note    Bedside shift change report given to Herve WATERMAN (oncoming nurse) by Anne Grider RN (offgoing nurse).  Report included the following information SBAR, Kardex, Intake/Output, MAR, Recent Results, and Cardiac Rhythm AFIB    Shift worked:  [id number]     Shift summary and any significant changes:     See above note. RR on this shift for symptomatic tachycardia. Pt. Now back on amio drip, dilt d/c by cardiologist today, please refer to their note. Pt. Also seen by GI and pulm, palliative  RN

## 2025-06-08 NOTE — SIGNIFICANT EVENT
RAPID RESPONSE TEAM NOTE:    6/8/25 0130 - ( CMSU 2112/01) Tachycardia    Assessment:    Pt admitted 6/7/25 with SVT (supraventricular tachycardia) [I47.10]  GRACE (acute kidney injury) [N17.9]  Sepsis (HCC) [A41.9]  Severe sepsis (HCC) [A41.9, R65.20]  Aspiration pneumonia of both lungs, unspecified aspiration pneumonia type, unspecified part of lung (HCC) [J69.0]  Acute hypoxic respiratory failure (HCC) [J96.01] and PMH -  Arthritis  Hiccups  Hx of acute poliomyelitis  Hx of rheumatic fever  Other ill-defined conditions(799.89)  Code status - DNR    Of note, pt had episode of SVT in ER yesterday and was cardioverted then started on amiodarone.    RRT called for sustained HR 190s. On arrival, pt sitting up in bed. EKG in progress. Showing SVT per MD at bedside. Vagal maneuvers unsuccessful.     Neuro - A/O x1 (self only, follows commands  Cardiac - tachycardic, diaphoretic  Respiratory - 10L HFNC, course, tachypneic, dyspneic   Lines/Drains - PIV x2  Gtts - Amiodarone 0.5 mg/min (stopped per MD)    Vital signs as follows: /93 map 111, , RR 29, Sat 95 on 10L, Temp 99    Sepsis Screen - admitted with sepsis    Interventions:     Deedee LAGUNA and Dr. DIANDRA Beatty at bedside, received orders for:    - EKG  - Cardizem 20mg IVP  - Start cardizem gtt  - CTA chest  - Add-on mag  - Potassium 10meq IV    Outcome:    Repeat EKG showing aflutter rate 117. Reviewed by MD at bedside.    Stabilized, no transfer. Patient to remain in room at this time.    RRT end - 0151    Please call with any questions or concerns.  Alise Abraham RN  RRT [phone]

## 2025-06-08 NOTE — CONSULTS
Cardiology Consult Note    CC: Dyspnea    PCP:Lino Regan DO  Reason for consult:  Aflutter; NQWMI; AS  Requesting MD:  Dr. Sparks  Admit Date: 6/7/2025   Today's Date: 6/8/2025   Cardiologist:  AICHA   Cardiac Assessment/Plan:   1) CAD: heavy cor ca on CT 12/2024; Pt denies eval/Rx CAD.         *NQWMI (trop 2k) 6/2025 c/w demand ischemia from hypotension/AS  2) AS/AI: severe AS (mean 43); mild-mod AI 12/2024 @ [facility]: he had f/u appt there but didn't have family with him so nothing was done.  3) Atypical atrial flutter POA 6/7/25: spont to NSR. Reported SVT 1/2025 @ [facility].  4) PFO, probable on echo 12/2024  5) HTN    6) Fall; MCA CVA 12/2024, [facility]: had PEG placed.  7) Aspiration on CT @ [facility] 12/2024; & 6/2025.  8) Prior EtOH abuse  9) H/o Polio muscular atrophy; Parkinson disease  10) Resp failure/hypoxia/hypotension/aspiration/GRACE 6/2025.  11) DNR/I    Admitted after resp failure w/ marked hypoxia/hypotension; Prob recurrent aspiration; GRACE  Atrial flutter POA: to sinus tachycardia; Trop 2ks.  Cardiac meds 6/8: dilt gtt; asa; lipitor 80; lovenox 80 bid; metoprolol 12.5 bid;     Unable to take PO meds per nursing.    Rec: D/C dilt; Amio (IV until able to take PO or by PEG); Metoprolol IV if not taking PO/PEG.  Echo  Poor candidate for invasive procedures currently     ____________________________________________________________________  Yogi NANCY Maged . is a 70 y.o. male presents with SVT (supraventricular tachycardia) [I47.10]  GRACE (acute kidney injury) [N17.9]  Sepsis (HCC) [A41.9]  Severe sepsis (HCC) [A41.9, R65.20]  Aspiration pneumonia of both lungs, unspecified aspiration pneumonia type, unspecified part of lung (HCC) [J69.0]  Acute hypoxic respiratory failure (HCC) [J96.01].    As noted in H&P: \"70 y.o.  male with PMHx significant chronic artery disease, hypertension, Parkinson disease, mood disorder presented to the emergency department from the rehab center acute respiratory distress.      In the  emergency department patient was found to be hypoxic to 6 L, his heart rate was in 200.  Respiratory rate was about 30.  Blood pressure was 60/30.      Labs consistent with elevated lactic acid to 5.11, and WBC 20.9 K. Admission creatinine 1.98.  Baseline creatinine 0.72.      Patient reported having palpitation and shortness of breath.  Not able to answer all the questions.  Was not fully oriented\"    ______________________________________________________________________    The patient reports no CP; less dyspnea; c/o foot pain;   He states he was walking last week, despite polio/Parkinsons  No reported CAD per pt  He know name/year/location    No PND, orthopnea, palpitations, pre-syncope, syncope, peripheral edema, or decrease in exercise tolerance.  No current complaints.    ECG independently interpreted: Atrial flutter @ 133; LVH; repolarization changes; then 150s, then 190s  Tele  independently interpreted: Aflutter to sinus tachycardia    CXR reviewed: \"pulmonary vasculature is within normal limits. Lung volumes are moderate with significant acute interstitial parenchymal opacity throughout the right midlung field and slightly less severe in the left perihilar lung field.\"    CT: \"1. Severe bilateral pneumonia, greatest in the right lower lobe.  2. Possible superimposed mild pulmonary edema.  3. No pulmonary embolism.  4. Tracheal debris suggest the possibility of aspiration pneumonia.\"    Labs reviewed; Notable: Na 148; K 3.6; Cr 1.38 from 1.98; LA 2.5; alb 2.3.  form 20; Hg 12.7.      High complexity decision making was performed  X Yes   High Level MDM (2 of 3 below)     Complexity (1 of 2):  1 or more chronic illnesses with severe exacerbation, progression, or side effects of treatment x Yes   1 acute or chronic illness or injury that poses a threat to life or bodily function  Yes     Extensive data review (2 of 3):  Cat 1 (any 3): review  external notes; review results of unique test; order unique  - 7.0 %    Basophils % 0 0.0 - 1.0 %    Immature Granulocytes % 0 0.0 - 0.5 %    Neutrophils Absolute 15.14 (H) 1.80 - 8.00 K/UL    Lymphocytes Absolute 1.72 0.80 - 3.50 K/UL    Monocytes Absolute 0.34 0.00 - 1.00 K/UL    Eosinophils Absolute 0.00 0.00 - 0.40 K/UL    Basophils Absolute 0.00 0.00 - 0.10 K/UL    Immature Granulocytes Absolute 0.00 0.00 - 0.04 K/UL    Differential Type MANUAL      RBC Comment NORMOCYTIC, NORMOCHROMIC      WBC Comment VACUOLATED POLYS     Renal Function Panel    Collection Time: 06/08/25  1:58 AM   Result Value Ref Range    Sodium 148 (H) 136 - 145 mmol/L    Potassium 3.6 3.5 - 5.1 mmol/L    Chloride 116 (H) 97 - 108 mmol/L    CO2 24 21 - 32 mmol/L    Anion Gap 8 2 - 12 mmol/L    Glucose 99 65 - 100 mg/dL    BUN 44 (H) 6 - 20 MG/DL    Creatinine 1.38 (H) 0.70 - 1.30 MG/DL    BUN/Creatinine Ratio 32 (H) 12 - 20      Est, Glom Filt Rate 54 (L) >60 ml/min/1.73m2    Calcium 9.8 8.5 - 10.1 MG/DL    Phosphorus 3.5 2.6 - 4.7 MG/DL    Albumin 2.3 (L) 3.5 - 5.0 g/dL   Magnesium    Collection Time: 06/08/25  1:58 AM   Result Value Ref Range    Magnesium 2.0 1.6 - 2.4 mg/dL   Troponin    Collection Time: 06/08/25  1:58 AM   Result Value Ref Range    Troponin, High Sensitivity 2,611 (HH) 0 - 76 ng/L   Lactic Acid    Collection Time: 06/08/25  4:42 AM   Result Value Ref Range    Lactic Acid, Plasma 2.5 (HH) 0.4 - 2.0 MMOL/L   Troponin    Collection Time: 06/08/25  4:42 AM   Result Value Ref Range    Troponin, High Sensitivity 2,327 (HH) 0 - 76 ng/L   Troponin    Collection Time: 06/08/25  6:12 AM   Result Value Ref Range    Troponin, High Sensitivity 2,038 (HH) 0 - 76 ng/L     No results for input(s): \"CPK\", \"CKMB\" in the last 72 hours.    Invalid input(s): \"CKNDX\", \"TROIQ\"    Recent Labs     06/07/25  1358 06/07/25  2237 06/08/25  0158    146* 148*   K 4.8 3.4* 3.6    116* 116*   CO2 27 24 24   BUN 49* 45* 44*   PHOS  --   --  3.5   WBC 20.9* 14.6* 17.2*   HGB 13.9 13.0 12.7   HCT 43.7

## 2025-06-08 NOTE — CASE COMMUNICATION
Received message from nurse about HR still in Afib RVR in 120-140s. Currently on amio drip. Cardiology was consulted but did not have chance to see him today (later admission). He is here for sepsis due to aspiration vs community acquired pneumonia. On zosyn. Also had GRACE and NSTEMI (likely type 2)    Recommend to resume home metoprolol and monitor.    Also recommend repeat lactate, CBC, BMP and troponin.    Due to vomiting, switched PO --> IV metoprolol    LA was slightly increased 2.3 --> 2.5. Ordered 500cc LR bolus. K 3.4 -- ordered potassium chloride IV 10mEq. Crt improving now 1.3. Mag 2. Trop is increased now up to 2800. Started therapeutic lovenox for ACS.    I went to see the pt. He is warm and diaphoretic but temp is down to 98s. HR improved to around 100bpm. He denied any chest pain.     Subsequently RRT called for HR suddenly up to 190s. SVT on EKG. Pt remained fairly asymptomatic. Dr. Beatty was present. Given IV diltiazem 20mg which converted out of SVT and brought back to aflutter around 110s. Stopped amiodarone and switch to diltiazem drip. Dc IV metoprolol. Stat CTA ordered- pending.

## 2025-06-08 NOTE — PROGRESS NOTES
Occupational Therapy   Chart reviewed, not cleared for tx due to cardiac status; earlier RR, 's; resting HR currently in 120's. Will defer and continue to follow. Staci Recio OTR/L    1:19PM  Patient currently with RR; will continue to defer and continue to follow for OT when medically appropriate. Staci Recio OTR/L

## 2025-06-08 NOTE — PLAN OF CARE
Problem: Respiratory - Adult  Goal: Achieves optimal ventilation and oxygenation  6/8/2025 1042 by Anne Grider RN  Outcome: Progressing  6/8/2025 0753 by America Moise RCP  Outcome: Progressing     Problem: Discharge Planning  Goal: Discharge to home or other facility with appropriate resources  Outcome: Progressing     Problem: Safety - Adult  Goal: Free from fall injury  Outcome: Progressing     Problem: Skin/Tissue Integrity  Goal: Skin integrity remains intact  Description: 1.  Monitor for areas of redness and/or skin breakdown2.  Assess vascular access sites hourly3.  Every 4-6 hours minimum:  Change oxygen saturation probe site4.  Every 4-6 hours:  If on nasal continuous positive airway pressure, respiratory therapy assess nares and determine need for appliance change or resting period  Outcome: Progressing     Problem: ABCDS Injury Assessment  Goal: Absence of physical injury  Outcome: Progressing

## 2025-06-08 NOTE — PROGRESS NOTES
1900 Bedside and Verbal shift change report given to Herve Rodríguez RN (oncoming nurse) by RN (offgoing nurse). Report included the following information Nurse Handoff Report, MAR, Recent Results, and Cardiac Rhythm SVT.     0412 Latest potassium of pt is 3.0 and pt has an order of potassium chloride 10 mEq/100 mL IVPB (Peripheral Line) with admin instruction of level that is 2.7 to 3.0 to give 10 mEq IVPB x 6 doses (60 mEq Total). Deedee LAGUNA informed(to give).    0454 Started the potassium replacement.    0700 Bedside shift change report given to Melani WATERMAN (oncoming nurse) by Herve Loza RN (offgoing nurse).  Report included the following information SBAR, MAR, Recent Results, and Cardiac Rhythm SVT    Conrado Score 11. All of the following interventions have been implemented to prevent pressure injury:    SKIN ASSESSMENT (S)  Dual skin assessment completed at shift change: Yes  Name of second RN who completed Dual Skin Assessment: Anne WATERMAN  Picture of wound uploaded to EMR: Yes  Wound added as LDA in Avatar: Yes  Venelex ordered for stage 1 or greater pressure injuries ONLY: Yes  Wound care consulted if wounds present: Yes    SURFACE (S)  [place] air pump or specialty bed ordered: Yes  Type of bed: Hill-rom  Only white chux used with specialty surfaces (no green chux or bed alarm pads): Yes  Waffle cushion used for chair positioning: NA    KEEP MOVING (K)  Mobility status (Bedrest, Chairbound, UWA x 1 assist , 2 assist, Max assist): x2  Q2 turn sheet placed on outside of door and initialed appropriately:Yes  Q2 hour turns documented in flowsheets: Yes  Refusals to turn and education provided documented: NA  Device used to float heels: Pillow  PT/OT consulted: Yes    INCONTINENCE (I)  Incontinence status assessed Q2 hours: Yes  External catheter in use: Purewick  Barrier cream in use: Zinc    NUTRITION (N)  I/O's documented every 8 hours: Yes  Oral supplements ordered if appropriate: Yes  Nutrition services  consulted: Yes    All concerns about new DTI's must be escalated immediately to attending MD, charge nurse, CCL and nurse director.

## 2025-06-08 NOTE — PROGRESS NOTES
Pharmacy Antimicrobial Kinetic Dosing    Indication for Antimicrobials: PNA (nosocomial)     Current Regimen of Each Antimicrobial:  Vancomycin Pharmacy to Dose; Start Date ; Day # 2  Zosyn 3.375 g IV Q8H; Start Date ; Day # 2    Previous Antimicrobial Therapy:   metronidazole IV x1   CTX x1     Goal Level: Vancomycin trough 15-20    Date Dose & Interval Measured (mcg/mL) Predicted AUC 24-48 Predicted AUC 24,ss    2000mg x1 10.3 (~21.5 hr level) N/a N/a                   Significant Cultures:    Blood, paired: in process    Labs:  Recent Labs     Units 25  0158 25  2237 25  1723 25  1358   CREATININE MG/DL 1.38* 1.38*  --  1.98*   BUN MG/DL 44* 45*  --  49*   PROCAL ng/mL  --   --  5.10  --    WBC K/uL 17.2* 14.6*  --  20.9*   BANDS % 34  --   --  8     Temp (24hrs), Av.8 °F (37.7 °C), Min:98.7 °F (37.1 °C), Max:103.4 °F (39.7 °C)      Conditions for Dosing Consideration:  GRACE    Creatinine Clearance (mL/min): Estimated Creatinine Clearance: 50 mL/min (A) (based on SCr of 1.38 mg/dL (H)).       Impression/Plan:   Will give vancomycin 1000mg x1  Pt in GRACE (scr 0.7-0.8 at baseline) - will dose vancomycin by levels for now  Level ordered for  with AM labs  Predicted [id number] = N/A, Predicted AUC24,ss = N/A  CBC & BMP ordered daily per protocol  Antimicrobial stop date 7 days     Pharmacy will follow daily and adjust medications as appropriate for renal function and/or serum levels.    Thank you,  Lyle Medellin [facility]       detailed exam

## 2025-06-08 NOTE — PROGRESS NOTES
Rapid Response called for patient with increased heart rate(190's). SPO2 97% on 10LPM High Flow nasal cannula.  Respiratory not needed at this time.

## 2025-06-08 NOTE — CONSULTS
Pulmonary Consult Note    Requesting Provider: Dr. Sparks    Reason for Consult:  aspiration pneumonia, acute hypoxic respiratory failure        Assessment   Acute hypoxemic respiratory failure on 15 L   CAP vs. Aspiration PNA  A-fib with RVR  Parkinson's disease    Plan  Hypoxia requiring 15 L high flow nasal cannula.  Titrate oxygen to keep sats above 90%  Chest x-ray 6/7 with bilateral lung infiltrates right greater than left.  Procalcitonin 5.1, WBCs 20.9, trend procalcitonin  CTA chest 6/8 reviewed, bilateral pneumonia most pronounced in RLL. Strongly suspect aspiration.   Follow blood close cultures, Legionella and strep pneumo pending  Currently on vancomycin and Zosyn, if cultures remain negative, can narrow to Unasyn in next few days.   Continue DuoNebs and chest PT  Currently n.p.o. patient would benefit from a swallow study and speech therapy evaluation       Gianna Liu, FNP-C  [facility]   [phone] [facility]  [phone] ([id number]) Office  [phone] Fax      Talia Garcia MD attending     HPI: Yogi Lynne is a 70 y.o.  male with PMHx significant chronic artery disease, hypertension, Parkinson disease, mood disorder presented to the emergency department from the rehab center acute respiratory distress.      In the emergency department patient was found to be hypoxic to 6 L, his heart rate was in 200.  Respiratory rate was about 30.  Blood pressure was 60/30.      Labs consistent with elevated lactic acid to 5.11, and WBC 20.9 K. Admission creatinine 1.98.  Baseline creatinine 0.72.      Patient reported having palpitation and shortness of breath.  Not able to answer all the questions.  Was not fully oriented    Review of Systems: All other systems have been reviewed and are negative except per HPI    Past Medical History:  Past Medical History:   Diagnosis Date    Arthritis     Hiccups 4/27/2011    thorsteffany and referred for upper GI: duodenitis    Hx of acute poliomyelitis 1954       clinical decision-making for this encounter.   I have provided supervision and/or collaboration consistent with state law and institutional policy, including any requirements for direct, general, or indirect supervision as defined by regulatory authorities.   My participation in this patient encounter meets the requirements for physician attestation and, if applicable, \"incident to\" billing under Medicare guidelines.   The time spent reviewing and supervising this encounter is accurately reflected in the medical record.  Total  Time Exclusive of Procedures and ACP/resident Encounter Time: 45 min    Talia Garcia MD  Pulmonary Critical Care   [facility]   [phone] ([id number]) Office  [phone] Fax   [phone] [facility]

## 2025-06-08 NOTE — PROGRESS NOTES
Orders received, chart reviewed. Pt s/p rapid response and not currently medically appropriate for participation in PT evaluation. Pt with elevated HR (currently 120s, previously 190s), increased RR and requiring increased amts of O2, and fever. Will defer however continue to follow.    Danae Lora, PT, DPT

## 2025-06-08 NOTE — PROGRESS NOTES
Spiritual Health History and Assessment/Progress Note  [facility]    Attempted Encounter,  ,  ,      Name: Yogi Lynne Sr. MRN: [id number]    Age: 70 y.o.     Sex: male   Language: English   Episcopal: [employer]ism   Sepsis (HCC)     Date: 6/8/2025            Total Time Calculated: 5 min              Spiritual Assessment began in [facility] 2 CARDIAC MEDICAL STEP DOWN        Referral/Consult From: Rounding   Encounter Overview/Reason: Attempted Encounter  Service Provided For: Patient    Lety, Belief, Meaning:   Patient unable to assess at this time  Family/Friends No family/friends present      Importance and Influence:  Patient unable to assess at this time  Family/Friends No family/friends present    Community:  Patient Other: unable to assess  Family/Friends No family/friends present    Assessment and Plan of Care:     Patient Interventions include: Other: unable to assess  Family/Friends Interventions include: No family/friends present    Patient Plan of Care: Spiritual Care available upon further referral  Family/Friends Plan of Care: Spiritual Care available upon further referral     attempted to visit patient, but the patient was asleep.    Electronically signed by Chaplain Roney on 6/8/2025 at 2:07 PM

## 2025-06-08 NOTE — PROGRESS NOTES
Hospitalist Progress Note    NAME:   Yogi Lynne .   : 1955   MRN: [id number]     Date/Time: 2025 9:14 AM  Patient PCP: Lino Regan DO    Estimated discharge date:  Barriers: needs improvement in sepsis , hr , respiration , slp , cardio     Assessment / Plan:  Severe sepsis with septic shock possibly secondary to aspiration pneumonia versus community-acquired pneumonia  Acute hypoxic respiratory failure due to above  Lactic acidosis  Leukocytosis  Patient admitted to stepdown unit with telemetry  Chest x-ray on 2025-bilateral lung infiltrates, right greater than left.   Lactic acid 5.11---> 2.34>> 2.5  Procalcitonin 5.1  WBC trend 20.9>> 14.6>>1 7.2  CTA chest with and without contrast-done on 2025-severe bilateral pneumonia greatest in the right lower lobe possible superimposed pulmonary edema no pulm embolism, tracheal debris suggest possible aspiration  Respiratory viral panel negative  On new oxygen demand of 15 L HF   Follow-up blood culture, Legionella  Status post vancomycin and ceftriaxone, and metronidazole in the emergency department  Continue vancomycin and Zosyn  DuoNebs every 4 hours  Chest physiotherapy  Continue on supplemental oxygen, with goal above 92%  Will keep patient n.p.o.  Order SLP, PT, OT   Will place a pulmonology consult  Continue to wean off oxygen as much as possible  Trend lactate until normal  Patient is at high risk of decompensation despite treatment     A-fib with RVR  Possibly due to underlying severe sepsis  Likely NSTEMI  [id number] score-2  EKG on 2025 atrial fibrillation with rapid ventricular rate of 123 bpm  Troponin trends: 224>> 781>> 2819>>   Continue on telemetry monitoring  Amiodarone load>> was discontinued overnight and started patient on diltiazem drip  Cardiology consult placed-advised to DC diltiazem drip and restart amiodarone.  Patient has a PEG tube that can be used for pills.  Continue Lovenox full dose  Follow-up  insight. Not anxious nor agitated  Skin:  No rashes.  No jaundice    Reviewed most current lab test results and cultures  YES  Reviewed most current radiology test results   YES  Review and summation of old records today    NO  Reviewed patient's current orders and MAR    YES  PMH/SH reviewed - no change compared to H&P    Procedures: see electronic medical records for all procedures/Xrays and details which were not copied into this note but were reviewed prior to creation of Plan.      LABS:  I reviewed today's most current labs and imaging studies.  Pertinent labs include:  Recent Labs     06/07/25 1358 06/07/25 2237 06/08/25  0158   WBC 20.9* 14.6* 17.2*   HGB 13.9 13.0 12.7   HCT 43.7 40.9 40.3   * 372 368     Recent Labs     06/07/25 1358 06/07/25 2237 06/08/25  0158    146* 148*   K 4.8 3.4* 3.6    116* 116*   CO2 27 24 24   GLUCOSE 110* 111* 99   BUN 49* 45* 44*   CREATININE 1.98* 1.38* 1.38*   CALCIUM 10.7* 9.9 9.8   MG  --  2.0 2.0   PHOS  --   --  3.5   BILITOT 1.4*  --   --    AST 69*  --   --    ALT 19  --   --    INR 1.1  --   --        Signed: Emily Sparks MD

## 2025-06-08 NOTE — PROGRESS NOTES
Nurse called to check patient with SAT's mid 80's.Patient SPO2 89% with labored breathing.  Sticky finger probe and nasal probe provide to get accurate reading.  When placed on sticky finger probe, SPO2 97% on 15LPM High Flow Nasal Cannula.

## 2025-06-09 ENCOUNTER — APPOINTMENT (OUTPATIENT)
Facility: HOSPITAL | Age: 70
DRG: 871 | End: 2025-06-09
Payer: MEDICARE

## 2025-06-09 ENCOUNTER — APPOINTMENT (OUTPATIENT)
Facility: HOSPITAL | Age: 70
DRG: 871 | End: 2025-06-09
Attending: INTERNAL MEDICINE
Payer: MEDICARE

## 2025-06-09 LAB
ALBUMIN SERPL-MCNC: 1.9 G/DL (ref 3.5–5)
ANION GAP SERPL CALC-SCNC: 6 MMOL/L (ref 2–12)
BASOPHILS # BLD: 0 K/UL (ref 0–0.1)
BASOPHILS NFR BLD: 0 % (ref 0–1)
BUN SERPL-MCNC: 39 MG/DL (ref 6–20)
BUN/CREAT SERPL: 35 (ref 12–20)
CALCIUM SERPL-MCNC: 9.4 MG/DL (ref 8.5–10.1)
CHLORIDE SERPL-SCNC: 121 MMOL/L (ref 97–108)
CO2 SERPL-SCNC: 23 MMOL/L (ref 21–32)
CREAT SERPL-MCNC: 1.11 MG/DL (ref 0.7–1.3)
DIFFERENTIAL METHOD BLD: ABNORMAL
ECHO AV MEAN GRADIENT: 40 MMHG
ECHO AV MEAN VELOCITY: 2.9 M/S
ECHO AV PEAK GRADIENT: 67 MMHG
ECHO AV PEAK GRADIENT: 67 MMHG
ECHO AV PEAK VELOCITY: 4.1 M/S
ECHO AV PEAK VELOCITY: 4.1 M/S
ECHO AV VTI: 82.1 CM
ECHO BSA: 1.98 M2
ECHO LV EDV A4C: 90 ML
ECHO LV EDV INDEX A4C: 46 ML/M2
ECHO LV EF PHYSICIAN: 65 %
ECHO LV EJECTION FRACTION A4C: 31 %
ECHO LV ESV A4C: 62 ML
ECHO LV ESV INDEX A4C: 31 ML/M2
ECHO LVOT AV VTI INDEX: 0.3
ECHO LVOT MEAN GRADIENT: 3 MMHG
ECHO LVOT PEAK GRADIENT: 5 MMHG
ECHO LVOT PEAK VELOCITY: 1.1 M/S
ECHO LVOT VTI: 24.6 CM
ECHO MV A VELOCITY: 0.72 M/S
ECHO MV E DECELERATION TIME (DT): 375.6 MS
ECHO MV E VELOCITY: 0.4 M/S
ECHO MV E/A RATIO: 0.56
ECHO MV LVOT VTI INDEX: 1.2
ECHO MV MAX VELOCITY: 1.4 M/S
ECHO MV MEAN GRADIENT: 4 MMHG
ECHO MV MEAN VELOCITY: 0.9 M/S
ECHO MV PEAK GRADIENT: 8 MMHG
ECHO MV REGURGITANT PEAK GRADIENT: 0 MMHG
ECHO MV REGURGITANT PEAK VELOCITY: 0 M/S
ECHO MV VTI: 29.4 CM
ECHO RV FREE WALL PEAK S': 9.7 CM/S
ECHO RV TAPSE: 1.5 CM (ref 1.7–?)
ECHO TV REGURGITANT MAX VELOCITY: 1.96 M/S
ECHO TV REGURGITANT PEAK GRADIENT: 15 MMHG
EOSINOPHIL # BLD: 0 K/UL (ref 0–0.4)
EOSINOPHIL NFR BLD: 0 % (ref 0–7)
ERYTHROCYTE [DISTWIDTH] IN BLOOD BY AUTOMATED COUNT: 14.6 % (ref 11.5–14.5)
GLUCOSE SERPL-MCNC: 98 MG/DL (ref 65–100)
HCT VFR BLD AUTO: 31.7 % (ref 36.6–50.3)
HGB BLD-MCNC: 10.1 G/DL (ref 12.1–17)
IMM GRANULOCYTES # BLD AUTO: 0 K/UL (ref 0–0.04)
IMM GRANULOCYTES NFR BLD AUTO: 0 % (ref 0–0.5)
LYMPHOCYTES # BLD: 0.99 K/UL (ref 0.8–3.5)
LYMPHOCYTES NFR BLD: 5 % (ref 12–49)
MAGNESIUM SERPL-MCNC: 2.3 MG/DL (ref 1.6–2.4)
MCH RBC QN AUTO: 30.7 PG (ref 26–34)
MCHC RBC AUTO-ENTMCNC: 31.9 G/DL (ref 30–36.5)
MCV RBC AUTO: 96.4 FL (ref 80–99)
MONOCYTES # BLD: 0.79 K/UL (ref 0–1)
MONOCYTES NFR BLD: 4 % (ref 5–13)
NEUTS SEG # BLD: 18.02 K/UL (ref 1.8–8)
NEUTS SEG NFR BLD: 91 % (ref 32–75)
NRBC # BLD: 0 K/UL (ref 0–0.01)
NRBC BLD-RTO: 0 PER 100 WBC
PHOSPHATE SERPL-MCNC: 2.5 MG/DL (ref 2.6–4.7)
PLATELET # BLD AUTO: 369 K/UL (ref 150–400)
PMV BLD AUTO: 10.5 FL (ref 8.9–12.9)
POTASSIUM SERPL-SCNC: 3 MMOL/L (ref 3.5–5.1)
PROT SERPL-MCNC: 6.9 G/DL (ref 6.4–8.2)
RBC # BLD AUTO: 3.29 M/UL (ref 4.1–5.7)
RBC MORPH BLD: ABNORMAL
SODIUM SERPL-SCNC: 150 MMOL/L (ref 136–145)
WBC # BLD AUTO: 19.8 K/UL (ref 4.1–11.1)
WBC MORPH BLD: ABNORMAL

## 2025-06-09 PROCEDURE — 99223 1ST HOSP IP/OBS HIGH 75: CPT

## 2025-06-09 PROCEDURE — 2580000003 HC RX 258: Performed by: INTERNAL MEDICINE

## 2025-06-09 PROCEDURE — 97530 THERAPEUTIC ACTIVITIES: CPT

## 2025-06-09 PROCEDURE — 2060000000 HC ICU INTERMEDIATE R&B

## 2025-06-09 PROCEDURE — 2580000003 HC RX 258: Performed by: STUDENT IN AN ORGANIZED HEALTH CARE EDUCATION/TRAINING PROGRAM

## 2025-06-09 PROCEDURE — 6360000004 HC RX CONTRAST MEDICATION

## 2025-06-09 PROCEDURE — 6370000000 HC RX 637 (ALT 250 FOR IP): Performed by: STUDENT IN AN ORGANIZED HEALTH CARE EDUCATION/TRAINING PROGRAM

## 2025-06-09 PROCEDURE — 6370000000 HC RX 637 (ALT 250 FOR IP): Performed by: PHYSICIAN ASSISTANT

## 2025-06-09 PROCEDURE — 83735 ASSAY OF MAGNESIUM: CPT

## 2025-06-09 PROCEDURE — 2500000003 HC RX 250 WO HCPCS: Performed by: STUDENT IN AN ORGANIZED HEALTH CARE EDUCATION/TRAINING PROGRAM

## 2025-06-09 PROCEDURE — 84155 ASSAY OF PROTEIN SERUM: CPT

## 2025-06-09 PROCEDURE — 6360000004 HC RX CONTRAST MEDICATION: Performed by: INTERNAL MEDICINE

## 2025-06-09 PROCEDURE — 97166 OT EVAL MOD COMPLEX 45 MIN: CPT

## 2025-06-09 PROCEDURE — 97535 SELF CARE MNGMENT TRAINING: CPT

## 2025-06-09 PROCEDURE — 6360000002 HC RX W HCPCS: Performed by: STUDENT IN AN ORGANIZED HEALTH CARE EDUCATION/TRAINING PROGRAM

## 2025-06-09 PROCEDURE — 93005 ELECTROCARDIOGRAM TRACING: CPT | Performed by: HOSPITALIST

## 2025-06-09 PROCEDURE — 97162 PT EVAL MOD COMPLEX 30 MIN: CPT

## 2025-06-09 PROCEDURE — 92610 EVALUATE SWALLOWING FUNCTION: CPT | Performed by: SPEECH-LANGUAGE PATHOLOGIST

## 2025-06-09 PROCEDURE — 6360000002 HC RX W HCPCS: Performed by: PHYSICIAN ASSISTANT

## 2025-06-09 PROCEDURE — 36415 COLL VENOUS BLD VENIPUNCTURE: CPT

## 2025-06-09 PROCEDURE — 80069 RENAL FUNCTION PANEL: CPT

## 2025-06-09 PROCEDURE — C8929 TTE W OR WO FOL WCON,DOPPLER: HCPCS

## 2025-06-09 PROCEDURE — 2700000000 HC OXYGEN THERAPY PER DAY

## 2025-06-09 PROCEDURE — 74177 CT ABD & PELVIS W/CONTRAST: CPT

## 2025-06-09 PROCEDURE — 6360000002 HC RX W HCPCS: Performed by: INTERNAL MEDICINE

## 2025-06-09 PROCEDURE — 85025 COMPLETE CBC W/AUTO DIFF WBC: CPT

## 2025-06-09 RX ORDER — POTASSIUM CHLORIDE 7.45 MG/ML
10 INJECTION INTRAVENOUS
Status: COMPLETED | OUTPATIENT
Start: 2025-06-09 | End: 2025-06-09

## 2025-06-09 RX ORDER — IOPAMIDOL 755 MG/ML
100 INJECTION, SOLUTION INTRAVASCULAR
Status: COMPLETED | OUTPATIENT
Start: 2025-06-09 | End: 2025-06-09

## 2025-06-09 RX ORDER — CARBIDOPA AND LEVODOPA 25; 100 MG/1; MG/1
1 TABLET ORAL 3 TIMES DAILY
Status: DISCONTINUED | OUTPATIENT
Start: 2025-06-09 | End: 2025-06-11

## 2025-06-09 RX ORDER — DEXTROSE MONOHYDRATE, SODIUM CHLORIDE, AND POTASSIUM CHLORIDE 50; 1.49; 4.5 G/1000ML; G/1000ML; G/1000ML
INJECTION, SOLUTION INTRAVENOUS CONTINUOUS
Status: DISCONTINUED | OUTPATIENT
Start: 2025-06-09 | End: 2025-06-11

## 2025-06-09 RX ADMIN — ENOXAPARIN SODIUM 80 MG: 100 INJECTION SUBCUTANEOUS at 21:39

## 2025-06-09 RX ADMIN — ATORVASTATIN CALCIUM 80 MG: 40 TABLET, FILM COATED ORAL at 08:57

## 2025-06-09 RX ADMIN — SULFUR HEXAFLUORIDE 2 ML: KIT at 11:51

## 2025-06-09 RX ADMIN — POTASSIUM CHLORIDE 10 MEQ: 7.46 INJECTION, SOLUTION INTRAVENOUS at 04:54

## 2025-06-09 RX ADMIN — METOPROLOL TARTRATE 12.5 MG: 25 TABLET ORAL at 08:57

## 2025-06-09 RX ADMIN — POTASSIUM CHLORIDE 10 MEQ: 7.46 INJECTION, SOLUTION INTRAVENOUS at 05:55

## 2025-06-09 RX ADMIN — METOPROLOL TARTRATE 5 MG: 1 INJECTION, SOLUTION INTRAVENOUS at 16:03

## 2025-06-09 RX ADMIN — ACETAMINOPHEN 650 MG: 325 TABLET ORAL at 08:57

## 2025-06-09 RX ADMIN — SODIUM CHLORIDE, PRESERVATIVE FREE 10 ML: 5 INJECTION INTRAVENOUS at 08:58

## 2025-06-09 RX ADMIN — ASPIRIN 81 MG: 81 TABLET, CHEWABLE ORAL at 08:58

## 2025-06-09 RX ADMIN — SODIUM CHLORIDE, SODIUM LACTATE, POTASSIUM CHLORIDE, AND CALCIUM CHLORIDE: .6; .31; .03; .02 INJECTION, SOLUTION INTRAVENOUS at 04:22

## 2025-06-09 RX ADMIN — ENOXAPARIN SODIUM 80 MG: 100 INJECTION SUBCUTANEOUS at 08:57

## 2025-06-09 RX ADMIN — CARBIDOPA AND LEVODOPA 1 TABLET: 25; 100 TABLET ORAL at 08:57

## 2025-06-09 RX ADMIN — VANCOMYCIN HYDROCHLORIDE 750 MG: 750 INJECTION, POWDER, LYOPHILIZED, FOR SOLUTION INTRAVENOUS at 11:45

## 2025-06-09 RX ADMIN — CARBIDOPA AND LEVODOPA 1 TABLET: 25; 100 TABLET ORAL at 14:37

## 2025-06-09 RX ADMIN — FLUOXETINE 20 MG: 20 LIQUID ORAL at 08:57

## 2025-06-09 RX ADMIN — DEXTROSE MONOHYDRATE, SODIUM CHLORIDE, AND POTASSIUM CHLORIDE: 50; 4.5; 1.49 INJECTION, SOLUTION INTRAVENOUS at 22:06

## 2025-06-09 RX ADMIN — ACETAMINOPHEN 650 MG: 650 SUPPOSITORY RECTAL at 23:52

## 2025-06-09 RX ADMIN — PIPERACILLIN AND TAZOBACTAM 3375 MG: 3; .375 INJECTION, POWDER, LYOPHILIZED, FOR SOLUTION INTRAVENOUS at 23:45

## 2025-06-09 RX ADMIN — METOPROLOL TARTRATE 12.5 MG: 25 TABLET ORAL at 21:51

## 2025-06-09 RX ADMIN — IOPAMIDOL 100 ML: 755 INJECTION, SOLUTION INTRAVENOUS at 18:59

## 2025-06-09 RX ADMIN — AMIODARONE HYDROCHLORIDE 0.5 MG/MIN: 50 INJECTION, SOLUTION INTRAVENOUS at 09:02

## 2025-06-09 RX ADMIN — POTASSIUM CHLORIDE 10 MEQ: 7.46 INJECTION, SOLUTION INTRAVENOUS at 11:44

## 2025-06-09 RX ADMIN — FOLIC ACID 1 MG: 1 TABLET ORAL at 08:57

## 2025-06-09 RX ADMIN — DEXTROSE MONOHYDRATE, SODIUM CHLORIDE, AND POTASSIUM CHLORIDE: 50; 4.5; 1.49 INJECTION, SOLUTION INTRAVENOUS at 08:56

## 2025-06-09 RX ADMIN — LANSOPRAZOLE 30 MG: 30 TABLET, ORALLY DISINTEGRATING, DELAYED RELEASE ORAL at 08:57

## 2025-06-09 RX ADMIN — PIPERACILLIN AND TAZOBACTAM 3375 MG: 3; .375 INJECTION, POWDER, LYOPHILIZED, FOR SOLUTION INTRAVENOUS at 05:52

## 2025-06-09 RX ADMIN — VANCOMYCIN HYDROCHLORIDE 750 MG: 750 INJECTION, POWDER, LYOPHILIZED, FOR SOLUTION INTRAVENOUS at 23:52

## 2025-06-09 RX ADMIN — CARBIDOPA AND LEVODOPA 1 TABLET: 25; 100 TABLET ORAL at 21:39

## 2025-06-09 RX ADMIN — PIPERACILLIN AND TAZOBACTAM 3375 MG: 3; .375 INJECTION, POWDER, LYOPHILIZED, FOR SOLUTION INTRAVENOUS at 14:38

## 2025-06-09 RX ADMIN — POTASSIUM CHLORIDE 10 MEQ: 7.46 INJECTION, SOLUTION INTRAVENOUS at 08:45

## 2025-06-09 RX ADMIN — SODIUM CHLORIDE, PRESERVATIVE FREE 10 ML: 5 INJECTION INTRAVENOUS at 21:51

## 2025-06-09 RX ADMIN — POTASSIUM CHLORIDE 10 MEQ: 7.46 INJECTION, SOLUTION INTRAVENOUS at 06:56

## 2025-06-09 RX ADMIN — POTASSIUM CHLORIDE 10 MEQ: 7.46 INJECTION, SOLUTION INTRAVENOUS at 10:42

## 2025-06-09 RX ADMIN — POTASSIUM CHLORIDE 10 MEQ: 7.46 INJECTION, SOLUTION INTRAVENOUS at 09:49

## 2025-06-09 RX ADMIN — DOCUSATE SODIUM 50 MG AND SENNOSIDES 8.6 MG 1 TABLET: 8.6; 5 TABLET, FILM COATED ORAL at 08:57

## 2025-06-09 NOTE — SIGNIFICANT EVENT
RAPID RESPONSE TEAM    Overhead rapid response paged to room # 2112/01  at 1602    Reason for rapid response: Tachycardia    Initial assessment:   Patient is alert. HR 200s on monitor, BP stable; Amio 0.5 mg/min infusing. Patient is tachypneic in mid 20s, spo2 98% on 12L HF.    Interventions:     Dr. Sparks at bedside:    - PRN Lopressor 5 mg IV  - EKG - reviewed by MD    Outcome:   HR improved following IV Lopressor, appears fib/flutter on monitor, VSS. pt to remain in room # 2112/01         Please call with any questions or concerns    Jennifer Patrick RN  Rapid Response Team  Ext [phone]     Recent Results (from the past 8 hours)   Echo (TTE) complete (PRN contrast/bubble/strain/3D)    Collection Time: 06/09/25 11:51 AM   Result Value Ref Range    LV Ejection Fraction A4C 31 %    LV EDV A4C 90 mL    LV ESV A4C 62 mL    LVOT Peak Gradient 5 mmHg    LVOT Mean Gradient 3 mmHg    LVOT Peak Velocity 1.1 m/s    LVOT VTI 24.6 cm    RV Free Wall Peak S' 9.7 cm/s    AV Peak Gradient 67 mmHg    AV Peak Gradient 67 mmHg    AV Mean Gradient 40 mmHg    AV Peak Velocity 4.1 m/s    AV Peak Velocity 4.1 m/s    AV Mean Velocity 2.9 m/s    AV VTI 82.1 cm    MV A Velocity 0.72 m/s    MV E Wave Deceleration Time 375.6 ms    MV E Velocity 0.40 m/s    MR Peak Gradient 0 mmHg    MR Peak Velocity 0.0 m/s    MV Peak Gradient 8 mmHg    MV Mean Gradient 4 mmHg    MV Max Velocity 1.4 m/s    MV Mean Velocity 0.9 m/s    MV VTI 29.4 cm    TAPSE 1.5 (A) >=1.7 cm    TR Peak Gradient 15 mmHg    TR Max Velocity 1.96 m/s    Body Surface Area 1.98 m2    LV ESV Index A4C 31 mL/m2    LV EDV Index A4C 46 mL/m2    MV E/A 0.56     LVOT:AV VTI Index 0.30     MV:LVOT VTI Index 1.20     EF Physician 65 %

## 2025-06-09 NOTE — PLAN OF CARE
Speech LAnguage Pathology EVALUATION    Patient: Yogi Lynne . (70 y.o. male)  Date: 6/9/2025  Primary Diagnosis: SVT (supraventricular tachycardia) [I47.10]  GRACE (acute kidney injury) [N17.9]  Sepsis (HCC) [A41.9]  Severe sepsis (HCC) [A41.9, R65.20]  Aspiration pneumonia of both lungs, unspecified aspiration pneumonia type, unspecified part of lung (HCC) [J69.0]  Acute hypoxic respiratory failure (HCC) [J96.01]       Precautions:                     ASSESSMENT :  Oral care completed prior to dysphagia assessment/ice chip trials.  Patient presents with severe oropharyngeal dysphagia characterized by absent mastication and slow bolus transit with frequent absent pharyngeal swallow.  With occasional swallows elicited noted minimal laryngeal movement.  Patient demonstrated inconsistent coughing with ice chip trials and frequent (3 mouthfuls) expectoration of thick secretions.  Noted increased RR to 37 with minimal ice chip trials.  Patient at high risk for aspiration given respiratory status, Parkinson's disease and h/o CVA with dysphagia.  NPO remains safest course.    Patient will benefit from skilled intervention to address the above impairments.     PLAN :  Recommendations and Planned Interventions:  Diet: NPO  Frequent oral care  Occasional ice chips after oral care    Acute SLP Services: SLP Plan of Care: 3 times/week. Patient's rehabilitation potential is considered to be Good.  Discharge Recommendations: Yes, recommend SLP treatment at next level of care     SUBJECTIVE:   Patient stated, “Let's have 2.” when offering ice chip trials.  Patient unable to recall if receiving PO diet prior to admit.    Called [facility] - patient receiving \"mechanical soft diet, nectar thickened liquids\" prior to admit.    OBJECTIVE:     Past Medical History:   Diagnosis Date    Arthritis     Hiccups 4/27/2011    narcisa and referred for upper GI: duodenitis    Hx of acute poliomyelitis 1954    no residual problems    Hx

## 2025-06-09 NOTE — PROGRESS NOTES
Orders received, chart reviewed and patient evaluated by physical therapy. Pending progression with skilled acute physical therapy, recommend:    Moderate intensity short-term skilled physical therapy up to 5x/week    Recommend with nursing chair position in bed 3x/day. Thank you for completing as able in order to maintain patient strength, endurance and independence.     Full evaluation to follow.

## 2025-06-09 NOTE — PROGRESS NOTES
Cardiology Progress Note  6/9/2025     Admit Date: 6/7/2025  Admit Diagnosis: SVT (supraventricular tachycardia) [I47.10]  GRACE (acute kidney injury) [N17.9]  Sepsis (HCC) [A41.9]  Severe sepsis (HCC) [A41.9, R65.20]  Aspiration pneumonia of both lungs, unspecified aspiration pneumonia type, unspecified part of lung (HCC) [J69.0]  Acute hypoxic respiratory failure (HCC) [J96.01]  CC: none currently  Cardiologist:  [facility]   Cardiac Assessment/Plan:    1) CAD: heavy cor ca on CT 12/2024; Pt denies eval/Rx CAD.         *NQWMI (trop 2k) 6/2025 c/w demand ischemia from hypotension/AS  2) AS/AI: severe AS (mean 43); mild-mod AI 12/2024 @ [facility]: he had f/u appt there but didn't have family with him so nothing was done.  3) Atypical atrial flutter POA 6/7/25: spont to NSR. Reported SVT 1/2025 @ [facility].  4) PFO, probable on echo 12/2024  5) HTN     6) Fall; MCA CVA 12/2024, [facility]: had PEG placed.  7) Aspiration on CT @ [facility] 12/2024; & 6/2025.  8) Prior EtOH abuse  9) H/o Polio muscular atrophy; Parkinson disease  10) Resp failure/hypoxia/hypotension/aspiration/GRACE 6/2025.  11) DNR/I     Admitted after resp failure w/ marked hypoxia/hypotension; Prob recurrent aspiration; GRACE  Atrial flutter POA: to sinus tachycardia; Trop 2ks.  Cardiac meds 6/8: dilt gtt; asa; lipitor 80; lovenox 80 bid; metoprolol 12.5 bid;      Unable to take PO meds per nursing.     Rec 6/8: D/C dilt; Amio (IV until able to take PO or by PEG); Metoprolol IV if not taking PO/PEG.  Echo  Poor candidate for invasive procedures currently      6/9: No apparent CP/resting dyspnea; cont c/o foot pain but doesn't know how long.  Tm 100.9; -130s; HR 80s-100s; sinus; 98% 15L; ?UOP  Na 150; K 3.0; Cr 1.1; Alb 1.9; WBC 20; Hg 10.1;    Cardiac meds: asa; lipitor 80; metoprolol 12.5 bid (IV prn if not taking PO); Amio 0.5 gtt.  Lovenox 80 bid    No new recs; When able to take PO or use PEG, change amio to PO/PEG.  Echo pending    Not currently a

## 2025-06-09 NOTE — PROGRESS NOTES
End of Shift Note    Bedside shift change report given to Anne WATERMAN (oncoming nurse) by Melani Rosas RN (offgoing nurse).  Report included the following information SBAR, Intake/Output, MAR, and Cardiac Rhythm NSR     Shift worked:  7a-3p     Shift summary and any significant changes:     NPO. Continue on Amino gtt.. HFNC 13L      Concerns for physician to address:  None      Zone phone for oncoming shift:   [phone]       Activity:  Level of Assistance: Dependent, patient does less than 25%  Number times ambulated in hallways past shift: 0  Number of times OOB to chair past shift: 0    Cardiac:   Cardiac Monitoring: Yes      Cardiac Rhythm: Sinus rhythm, A flutter    Access:  Current line(s): PIV     Genitourinary:        Respiratory:   O2 Device: High flow nasal cannula  Chronic home O2 use?: NO  Incentive spirometer at bedside: YES    GI:     Current diet:  Diet NPO Exceptions are: Ice Chips  Passing flatus: YES    Pain Management:   Patient states pain is manageable on current regimen: N/A    Skin:  Conrado Scale Score: 11  Interventions: Wound Offloading (Prevention Methods): Elevate heels, Pillows, Repositioning, Turning    Patient Safety:  Fall Risk: Nursing Judgement-Fall Risk High(Add Comments): Yes  Fall Risk Interventions  Nursing Judgement-Fall Risk High(Add Comments): Yes  Toilet Every 2 Hours-In Advance of Need: Yes  Hourly Visual Checks: Confused, In bed  Fall Visual Posted: Armband, Socks, Fall sign posted  Room Door Open: Yes  Alarm On: Bed  Patient Moved Closer to Nursing Station: No    Active Consults:   IP CONSULT TO PALLIATIVE CARE  IP CONSULT TO PHARMACY  IP CONSULT TO CARDIOLOGY  IP CONSULT TO GI  IP CONSULT TO PULMONOLOGY  IP CONSULT TO INFECTIOUS DISEASES    Length of Stay:  Expected LOS: 4  Actual LOS: 2    Melani Rosas, RN

## 2025-06-09 NOTE — CONSULTS
GI following. Please see progress note from earlier today. I checked on the PEG tube. Nurse told me she was administering medication earlier today and PEG site smelled very malodorous. Patient had been complaining of pain in the area as well. I evaluate the site and do not see any noticeable abnormal discharge. Will get CT A/P and follow up in AM.

## 2025-06-09 NOTE — CARE COORDINATION
Care Management Initial Assessment       RUR: 18% (moderate RUR)  Readmission? No  1st IM letter given? Yes - 2025  1st  letter given: No     1103 - Message sent to [facility] to determine if patient was under SNF or LTC.  Attending in agreement with SNF placement.    1209 - CM attempted to contact Kaylin listed on POA; unable to reach anyone.  CM contacted Kenji (\"Rai\"), brother, via phone to complete initial assessment.  He said that patient had a feeding tube since 2025.  He said that the patient previously lived at home alone and his friend, Brenda Mirza, was helping transport to appointments.  He was driving prior to .  He had a stroke in 2024.  He previously used the [facility] in [place].  Rai will be here tomorrow to discuss needs with care team.    1409 - [facility] accepted.     25 1212   Service Assessment   Patient Orientation Person   Cognition Alert   History Provided By Child/Family  (Kenji \"Rai\" (Brother))   Primary Caregiver Other (Comment)  ([facility] (private pay))   Accompanied By/Relationship N/A   Support Systems Family Members   Patient's Healthcare Decision Maker is: Named in Scanned ACP Document  (Patient's wife, Kaylin, is .  Per Rai, patient does not have a living spouse, parents or children.)   PCP Verified by CM Yes   Last Visit to PCP Within last year  (last )   Prior Functional Level Assistance with the following:;Bathing;Dressing;Toileting   Current Functional Level Assistance with the following:;Bathing;Dressing;Toileting   Can patient return to prior living arrangement Yes   Ability to make needs known: Good   Family able to assist with home care needs: Yes   Would you like for me to discuss the discharge plan with any other family members/significant others, and if so, who? No   Financial Resources Medicare   Community Resources None   Social/Functional History   Type of Home Facility  ([facility])   Home  MagedRai - Brother/Sister - [phone]    Secondary Decision Maker: Barrera Snider - Other Relative - [phone]     Today we documented Decision Maker(s) consistent with Legal Next of Kin hierarchy.  Content/Action Overview:  Has NO ACP documents-Information provided  Reviewed DNR/DNI and patient confirms current DNR status - completed forms on file (place new order if needed)  treatment goals    Length of Voluntary ACP Conversation in minutes:  <16 minutes (Non-Billable)    Bethany Virk, GUEVARA Virk, MSN, RN    Care Management  [phone]

## 2025-06-09 NOTE — PROGRESS NOTES
1500: Bedside and Verbal shift change report given to Anne WATERMAN (oncoming nurse) by Melani WATERMAN (offgoing nurse). Report included the following information Nurse Handoff Report, Index, Intake/Output, MAR, Recent Results, and Cardiac Rhythm NSR . Vital signs obtained, pt. Temp 101.3. MD notified.     End of Shift Note    Bedside shift change report given to Cheryl WATERMAN (oncoming nurse) by Anne Grider RN (offgoing nurse).  Report included the following information SBAR, Kardex, Intake/Output, MAR, Recent Results, and Cardiac Rhythm NSR-AFIB    Shift worked:  [id number]     Shift summary and any significant changes:     Resumed care of pt. At 1500 from Melani WATERMAN, please refer to her note for earlier part of shift.     Pt. Was RR at 1600 today for tachycardia, . RN attempted vagal maneuver as pt. Also went into SVT, unsuccessful. MD at bedside at the time, 5mg IV metroprolol given, HR decreased following administration. No other interventions.     RN accompanied pt. For CT of abdomen this evening, results pending. Still on 12L NC and amio drip.      Concerns for physician to address:  SOB, tachycardia    Zone phone for oncoming shift:   .       Activity:  Level of Assistance: Dependent, patient does less than 25%  Number times ambulated in hallways past shift: 0  Number of times OOB to chair past shift: 0    Cardiac:   Cardiac Monitoring: Yes      Cardiac Rhythm: Sinus rhythm, A flutter, Atrial fib    Access:  Current line(s): PIV     Genitourinary:        Respiratory:   O2 Device: High flow nasal cannula  Chronic home O2 use?: NO  Incentive spirometer at bedside: YES    GI:     Current diet:  Diet NPO Exceptions are: Ice Chips  Passing flatus: YES    Pain Management:   Patient states pain is manageable on current regimen: YES    Skin:  Conrado Scale Score: 11  Interventions: Wound Offloading (Prevention Methods): Bed, pressure reduction mattress, Elevate heels, Pillows, Repositioning, Turning    Patient Safety:  Fall

## 2025-06-09 NOTE — PLAN OF CARE
Problem: Occupational Therapy - Adult  Goal: By Discharge: Performs self-care activities at highest level of function for planned discharge setting.  See evaluation for individualized goals.  Description: FUNCTIONAL STATUS PRIOR TO ADMISSION:  Patient questionable historian secondary to confusion. Per chart review, he was admitted from SNF rehab and required assist for all ADLs and transfers.    ,  ,  ,  ,  ,  ,  ,  ,  ,  ,       HOME SUPPORT: Patient lived alone but has been in SNF rehab for several months.    Occupational Therapy Goals:  Initiated 6/9/2025  1.  Patient will perform grooming with Maximal Assist within 7 day(s).  2.  Patient will perform self-feeding with Moderate Assist within 7 day(s).  3.  Patient will perform supine > sit EOB to prepare for ADLs with Maximal Assist within 7 day(s).  4.  Patient will complete rolling side to side in bed to reduce caregiver burden during toileting task with Maximal Assist  within 7 day(s).  5.  Patient will participate in upper extremity therapeutic exercise/activities with Maximal Assist for 2 minutes within 7 day(s).    Outcome: Not Progressing   OCCUPATIONAL THERAPY EVALUATION    Patient: Yogi Lynne  (70 y.o. male)  Date: 6/9/2025  Primary Diagnosis: SVT (supraventricular tachycardia) [I47.10]  GRACE (acute kidney injury) [N17.9]  Sepsis (HCC) [A41.9]  Severe sepsis (HCC) [A41.9, R65.20]  Aspiration pneumonia of both lungs, unspecified aspiration pneumonia type, unspecified part of lung (HCC) [J69.0]  Acute hypoxic respiratory failure (HCC) [J96.01]         Precautions: Bed Alarm, Fall Risk                  ASSESSMENT :  The patient is limited by decreased functional mobility, independence in ADLs, ROM, strength, sensation, body mechanics, activity tolerance, endurance, safety awareness, cognition, attention/concentration, coordination, balance, posture, fine-motor control, increased pain levels.    Based on the impairments listed above patient is  brushing teeth? [x]  1 []  2 []  3 []  4   6.  Eating meals? []  1 [x]  2 []  3 []  4   © 2007, Trustees of [facility], under license to [employer]. All rights reserved     Score: 7/24     Interpretation of Tool:  Represents clinically-significant functional categories (i.e. Activities of daily living).    Cutoff score 39.4 (19) correlates to a good likelihood of discharging home versus a facility  Dacia Vera, Haritha Feldman, Ba Maldonado, Chelle Verdugo, Victor Manuel Ramos, Perez Vera, -PAC “6-Clicks” Functional Assessment Scores Predict Acute Care Hospital Discharge Destination, Physical Therapy, Volume 94, Issue 9, 1 September 2014, Pages 7089-2281, [email or web address]://[email or web address]    Pain Rating:  Patient c/o generalized discomfort   Pain Intervention(s):   repositioning    Activity Tolerance:   Poor, requires frequent rest breaks, observed shortness of breath on exertion, and desaturates with activity and requires oxygen    After treatment:   Patient left in no apparent distress in bed, Call bell within reach, Side rails x3, and Updated patient's board on functional status and mobility recommendations    COMMUNICATION/EDUCATION:   The patient's plan of care was discussed with: physical therapist and registered nurse    Patient Education  Education Given To: Patient  Education Provided: Role of Therapy;Plan of Care  Education Method: Verbal;Demonstration  Barriers to Learning: Cognition;Readiness to Learn  Education Outcome: Other (Comment) (impaired cognition)    Thank you for this referral.  America Saravia OT  Minutes: 20    Occupational Therapy Evaluation Charge Determination   History Examination Decision-Making   MEDIUM Complexity : Expanded review of history including physical, cognitive and psychocial history  MEDIUM Complexity: 3-5 Performance deficits relating to physical, cognitive, or psychosocial skills that result in activity limitations and/or participation

## 2025-06-09 NOTE — PROGRESS NOTES
Bedside and Verbal shift change report given to Melani RN (oncoming nurse) by Herve RN (offgoing nurse). Report included the following information Nurse Handoff Report, Adult Overview, Intake/Output, MAR, Recent Results, and Cardiac Rhythm NSR .

## 2025-06-09 NOTE — PROGRESS NOTES
Ayana Medeiros PA-C                       [phone] cell                      Friday 7:30 am- 4:30 pm         GI PROGRESS NOTE        NAME:   Yogi Lynne Sr.       :    1955       MRN:    [id number]     Assessment/Plan     2025 Consult Note by Dr. Stroud:  Yogi is a 70 y.o.   male who I was asked to see for blood per os w/ nml hgb now w/ resp failure/hypoxia/aspiration      He is medically complex pt who cannot give a detailed hx.     Has a hx of polio muscular atrophy, Parkinson disease, CAD w/ trop >2k  , severe AS and AI and atrial flutter   Previous  PEG placement.   Aspiration noted on CT 2024 and now 2025.     CXR 25:  Bilateral lung infiltrates, right greater than left. Correlate for infection  versus asymmetric pulmonary edema.       Latest Reference Range & Units 25 13:58 25 22:37 25 01:58   Hemoglobin Quant 12.1 - 17.0 g/dL 13.9 13.0 12.7   Hematocrit 36.6 - 50.3 % 43.7 40.9 40.3        2025: Patient sitting upright in bed. Says 'yes' to having abdominal pain and chest pain but not able to elaborate. His RN who has resumed care states she was told he had an episode of vomiting yesterday but not sure if there was any blood in vomit. No reports of blood in stool. Per cardiology note, pt is poor candidate for invasive procedures currently.    Recent Labs     25  0243 25  0158 25  2237   WBC 19.8* 17.2* 14.6*   HGB 10.1* 12.7 13.0   HCT 31.7* 40.3 40.9   MCV 96.4 97.8 96.7    368 372     CT Result (most recent):  CTA CHEST W WO CONTRAST 2025    Narrative  EXAM:  CTA CHEST W WO CONTRAST    INDICATION: Shortness of breath, tachycardia, and hypotension.    COMPARISON: CT angiography chest on 2025.    TECHNIQUE: Helical thin section chest CT following intravenous administration of  nonionic contrast 100 mL of isovue 370  according to departmental PE protocol.  Coronal and sagittal reformats were performed. 3D post processing was performed.  CT dose reduction was achieved through the use of a standardized protocol  tailored for this examination and automatic exposure control for dose  modulation.    FINDINGS: This is a good quality study for the evaluation of pulmonary embolism  to the first subsegmental arterial level. There is no pulmonary embolism to this  level.    Chest wall: No mass or axillary lymphadenopathy.  MEDIASTINUM: Small reactive lymph nodes.  JOHN: Right more than left reactive lymph nodes.  THORACIC AORTA: Calcific atherosclerosis. No aneurysm. Pulsation artifact.  HEART: Normal size, no effusion.  ESOPHAGUS: No wall thickening or dilatation.  TRACHEA/BRONCHI: Subtle debris is dependent in the trachea.  PLEURA: No effusion or pneumothorax.  LUNGS: Large right lower lobe airspace opacity does not well enhance. Patchy  mild airspace opacities in the left lower lobe. Moderate airspace opacities in  the right upper lobe and right middle lobe. Bilateral smooth interlobular septal  thickening. Groundglass opacities are patchy in the right lung. Interstitial  nodules are in both lower lobes. No abscess.  UPPER ABDOMEN: Partially imaged. No acute pathology.  BONES: Thoracic spine DISH and ankylosis.    Impression  1. Severe bilateral pneumonia, greatest in the right lower lobe.  2. Possible superimposed mild pulmonary edema.  3. No pulmonary embolism.  4. Tracheal debris suggest the possibility of aspiration pneumonia.      Electronically signed by Aaron Aburto    Impression  Hematemesis  CAD   ASI/AI   Atrial flutter  PFO   HTN  Aspiration, respiratory failure/hypoxia/hypotension        Plan:   Monitor H&H, transfuse PRN  If further episodes of bleeding, consider EGD but will need cardiology clearance. Currently, poor candidate for invasive procedures.   Continue supportive care, antiemetics PRN        Patient Active

## 2025-06-09 NOTE — PROGRESS NOTES
Hospitalist Progress Note    NAME:   Yogi Lynne .   : 1955   MRN: [id number]     Date/Time: 2025 7:48 AM  Patient PCP: Lino Regan DO    Estimated discharge date:6/10   Barriers: needs improvement in sepsis , hr , respiration , slp , cardio     Assessment / Plan:  Severe sepsis with septic shock possibly secondary to aspiration pneumonia versus community-acquired pneumonia  Acute hypoxic respiratory failure due to above  Lactic acidosis  Leukocytosis  Patient admitted to stepdown unit with telemetry  Chest x-ray on 2025-bilateral lung infiltrates, right greater than left.   Lactic acid 5.11---> 2.34>> 2.5>1.8  Procalcitonin 5.1  WBC trend 20.9>> 14.6>>1 7.2>>19.8  CTA chest with and without contrast-done on 2025-severe bilateral pneumonia greatest in the right lower lobe possible superimposed pulmonary edema no pulm embolism, tracheal debris suggest possible aspiration  Respiratory viral panel negative  On new oxygen demand of 15 L HF   Follow-up blood culture, Legionella  Status post vancomycin and ceftriaxone, and metronidazole in the emergency department  Continue vancomycin and Zosyn  DuoNebs every 4 hours  Chest physiotherapy- ONCE ABLE   Continue on supplemental oxygen, with goal above 92%  Will keep patient n.p.o.-follow-up SLP note-advised for n.p.o. frequent oral care occasional ice chips.  PT OT evaluated the patient advised for SNF  Continue to wean off oxygen as much as possible  Patient is at high risk of decompensation despite treatment  Pulmonology was consulted advised to continue above management.  Infectious disease consulted for nonimprovement of symptoms despite being on antibiotic  Continue maintenance fluid     A-fib with RVR  Possibly due to underlying severe sepsis  Likely NSTEMI  [id number] score-2  EKG on 2025 atrial fibrillation with rapid ventricular rate of 123 bpm  Troponin trends: 224>> 781>> 2819>>   Continue on telemetry  jaundice    Reviewed most current lab test results and cultures  YES  Reviewed most current radiology test results   YES  Review and summation of old records today    NO  Reviewed patient's current orders and MAR    YES  PMH/SH reviewed - no change compared to H&P    Procedures: see electronic medical records for all procedures/Xrays and details which were not copied into this note but were reviewed prior to creation of Plan.      LABS:  I reviewed today's most current labs and imaging studies.  Pertinent labs include:  Recent Labs     06/07/25 2237 06/08/25 0158 06/09/25  0243   WBC 14.6* 17.2* 19.8*   HGB 13.0 12.7 10.1*   HCT 40.9 40.3 31.7*    368 369     Recent Labs     06/07/25  1358 06/07/25 2237 06/08/25 0158 06/09/25  0243    146* 148* 150*   K 4.8 3.4* 3.6 3.0*    116* 116* 121*   CO2 27 24 24 23   GLUCOSE 110* 111* 99 98   BUN 49* 45* 44* 39*   CREATININE 1.98* 1.38* 1.38* 1.11   CALCIUM 10.7* 9.9 9.8 9.4   MG  --  2.0 2.0 2.3   PHOS  --   --  3.5 2.5*   BILITOT 1.4*  --   --   --    AST 69*  --   --   --    ALT 19  --   --   --    INR 1.1  --   --   --        Signed: Emily Sparks MD

## 2025-06-09 NOTE — PLAN OF CARE
Problem: SLP Adult - Impaired Swallowing  Goal: By Discharge: Advance to least restrictive diet without signs or symptoms of aspiration for planned discharge setting.  See evaluation for individualized goals.  Description: Speech Pathology Goals Initiated 6/9/25    1.  Patient will tolerate ice chips free of s/s aspiration within 7 days.  6/9/2025 0936 by Zonia White SLP  Outcome: Not Progressing     Problem: Physical Therapy - Adult  Goal: By Discharge: Performs mobility at highest level of function for planned discharge setting.  See evaluation for individualized goals.  Description: FUNCTIONAL STATUS PRIOR TO ADMISSION: Per old notes \"Per chart, patient was independent with wheelchair prior to having a stroke in December 2024 in [place]. Patient has since been at [facility]. Patient rather poor historian of recent events and unable to describe functional mobility status. A PT note on 1/7/25 at [facility] indicates Max to Total A x 2 for functional mobility.\" Pt has been at [facility] .    HOME SUPPORT PRIOR TO ADMISSION: The patient lived alone but has since been at [facility].    Physical Therapy Goals  Initiated 6/9/2025  1.  Patient will move from supine to sit and sit to supine in bed with maximal assistance within 7 day(s).    2.  Patient will tolerate sitting EOB for 1 min with fair sitting balance within 7 day(s).      6/9/2025 1039 by Nikia Hoyt, PT  Outcome: Not Progressing     Problem: Occupational Therapy - Adult  Goal: By Discharge: Performs self-care activities at highest level of function for planned discharge setting.  See evaluation for individualized goals.  Description: FUNCTIONAL STATUS PRIOR TO ADMISSION:  Patient questionable historian secondary to confusion. Per chart review, he was admitted from SNF rehab and required assist for all ADLs and transfers.    ,  ,  ,  ,  ,  ,  ,  ,  ,  ,       HOME SUPPORT: Patient lived alone but has been in SNF rehab for several  months.    Occupational Therapy Goals:  Initiated 6/9/2025  1.  Patient will perform grooming with Maximal Assist within 7 day(s).  2.  Patient will perform self-feeding with Moderate Assist within 7 day(s).  3.  Patient will perform supine > sit EOB to prepare for ADLs with Maximal Assist within 7 day(s).  4.  Patient will complete rolling side to side in bed to reduce caregiver burden during toileting task with Maximal Assist  within 7 day(s).  5.  Patient will participate in upper extremity therapeutic exercise/activities with Maximal Assist for 2 minutes within 7 day(s).    6/9/2025 1043 by America Saravia OT  Outcome: Not Progressing     Problem: SLP Adult - Impaired Swallowing  Goal: By Discharge: Advance to least restrictive diet without signs or symptoms of aspiration for planned discharge setting.  See evaluation for individualized goals.  Description: Speech Pathology Goals Initiated 6/9/25    1.  Patient will tolerate ice chips free of s/s aspiration within 7 days.  6/9/2025 0936 by Zonia White, SLP  Outcome: Not Progressing     Problem: Physical Therapy - Adult  Goal: By Discharge: Performs mobility at highest level of function for planned discharge setting.  See evaluation for individualized goals.  Description: FUNCTIONAL STATUS PRIOR TO ADMISSION: Per old notes \"Per chart, patient was independent with wheelchair prior to having a stroke in December 2024 in [place]. Patient has since been at SNF. Patient rather poor historian of recent events and unable to describe functional mobility status. A PT note on 1/7/25 at [facility] indicates Max to Total A x 2 for functional mobility.\" Pt has been at [facility] .    HOME SUPPORT PRIOR TO ADMISSION: The patient lived alone but has since been at [facility].    Physical Therapy Goals  Initiated 6/9/2025  1.  Patient will move from supine to sit and sit to supine in bed with maximal assistance within 7 day(s).    2.  Patient will tolerate sitting EOB for 1

## 2025-06-09 NOTE — CONSULTS
Palliative Medicine  Patient Name: Yogi Lynne Sr.  YOB: 1955  MRN: [id number]  Age: 70 y.o.  Gender: male    Date of Initial Consult: 6/9/2025  Date of Service: 6/9/2025  Time: 1:56 PM  Provider: ELLIE Restrepo CNP  Hospital Day: 3  Admit Date: 6/7/2025  Referring Provider: Celso Gonzalez MD      Reasons for Consultation:  Goals of Care    HISTORY OF PRESENT ILLNESS (HPI):   Yogi Lynne Sr. is a 70 y.o. male with a past medical history of chronic artery disease, hypertension, Parkinson disease, mood disorder, who was admitted on 6/7/2025 from [facility] with complaints of acute respiratory distress.     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  His brother Rai, is his medical power of .      PALLIATIVE DIAGNOSES:    Severe sepsis 2/2 aspiration pneumonia  Shortness of breath - O2 @ 15L  Dementia  DNR  Generalized weakness  Goals of care  Palliative care encounter      ASSESSMENT AND PLAN:   Palliative team has been asked to meet with Yogi Lynne to address goals of care.  Reviewed medical chart including admit H&P, consultant notes, MAR, and recent labs/imaging.  Mr. Lynne was seen and evaluated, no family at bedside. Introduced self and role of palliative.   At time of my arrival, he was resting in bed in no acute distress. Tech at bedside performing echo. Patient is awake, but not able to participate in meaningful discussion.    Outgoing call placed to patient's brother, Rai Lynne - he reported that he will be at bedside tomorrow, 6/10 - will continue discussion    Understanding of Illness/Discussion: We discussed the patient's condition in detail.   Brother, Rai verbalized a clear understanding of hospital events including issues leading to admission.   Rai reported that patient lived alone and was independent with all ADL's until last December when he got a stroke and was admitted.  He noted that  (Cultural Services, Patient Advocacy, Ethics, etc.)    Spiritual Affiliation: Anglican    Any spiritual / Sikh concerns:  [] Yes /  [x] No   If \"Yes\" to discuss with pastoral care during IDT     Does caregiver feel burdened by caring for their loved one:   [] Yes /  [x] No /  [] No Caregiver Present/Available [] No Caregiver [] Pt Lives at Facility  If \"Yes\" to discuss with social work during IDT    Anticipatory grief assessment:   [x] Normal  / [] Maladaptive     If \"Maladaptive\" to discuss with social work during IDT    ESAS Anxiety:      ESAS Depression:          LAB AND IMAGING FINDINGS:   Objective data reviewed:  labs, images, records, medication use, vitals, and chart     FINAL COMMENTS   Thank you for allowing Palliative Medicine to participate in the care of Yogi Lynne Sr..    Only check if applicable and billing time based rather than MDM  [x] The total encounter time on this service date was 75 minutes which was spent performing a face-to-face encounter and personally completing the provider-level activities documented in the note. This includes time spent prior to the visit and after the visit in direct care of the patient. This time does not include time spent in any separately reportable services.    Electronically signed by   ELLIE Restrepo CNP  Palliative Care Team  on 6/9/2025 at 1:56 PM

## 2025-06-09 NOTE — PROGRESS NOTES
Pulmonary Progress Note    Patient: Yogi Lynne Sr.                     YOB: 1955        Date- 6/9/2025                           Admit Date: 6/7/2025       CC: Follow up for acute respiratory failure and pneumonia    IMPRESSION & PLAN:     Acute hypoxemic respiratory failure on 15 L   Bilateral pneumonia, presumed aspiration  Septic shock  A-fib with RVR  Lactic acidosis  Parkinson's disease  DNR      Plan    Hypoxia requiring 15 L high flow nasal cannula.  Titrate oxygen to keep sats above 90%  CTA chest 6/8 reviewed, bilateral pneumonia most pronounced in RLL. Strongly suspect aspiration.   Blood cultures, urine Legionella pending. on vancomycin and Zosyn, lactic acidosis improving, trend procalcitonin  Continue chest PT  Dysphagia with recurrent aspirations, currently on maintenance fluids.  Hematemesis, GI felt not stable for EGD  Management of other conditions as per primary team       Medical Decision Making : High complexity problems, high amount of data reviewed and high risk management       Subjective:    Interval history    6/9-feels terrible.  RRT x 2 today.  Febrile and tachycardic.  O2 needs slightly improved, currently on 12 LPM.  Respiratory viral panel negative.  No oral diet due to recurrent aspirations.  GI saw for hematemesis, EGD deferred due to marked anemia.  On Amio drip for atrial arrhythmia.      HPI: Yogi Lynne is a 70 y.o.  male with PMHx significant chronic artery disease, hypertension, Parkinson disease, mood disorder presented to the emergency department from the rehab center acute respiratory distress.      In the emergency department patient was found to be hypoxic to 6 L, his heart rate was in 200.  Respiratory rate was about 30.  Blood pressure was 60/30.      Labs consistent with elevated lactic acid to 5.11, and WBC 20.9 K. Admission creatinine 1.98.  Baseline creatinine 0.72.      Patient reported having palpitation and shortness of breath.  Not  in 50 mL IVPB premix  2,000 mg IntraVENous PRN Astrid Martinez MD        ondansetron (ZOFRAN-ODT) disintegrating tablet 4 mg  4 mg Oral Q8H PRN Astrid Martinez MD        Or    ondansetron (ZOFRAN) injection 4 mg  4 mg IntraVENous Q6H PRN Astrid Martinez MD        polyethylene glycol (GLYCOLAX) packet 17 g  17 g Oral Daily PRN Astrid Martinez MD        acetaminophen (TYLENOL) tablet 650 mg  650 mg Oral Q6H PRN Astrid Martinez MD   650 mg at 06/09/25 0857    Or    acetaminophen (TYLENOL) suppository 650 mg  650 mg Rectal Q6H PRN Astrid Martinez MD   650 mg at 06/07/25 2002    aspirin chewable tablet 81 mg  81 mg Oral Daily Astrid Martinez MD   81 mg at 06/09/25 0858    atorvastatin (LIPITOR) tablet 80 mg  80 mg Oral Daily Astrid Martinez MD   80 mg at 06/09/25 0857    lansoprazole (PREVACID SOLUTAB) disintegrating tablet 30 mg  30 mg Oral Daily Astrid Martinez MD   30 mg at 06/09/25 0857    FLUoxetine (PROzac) 20 MG/5ML solution 20 mg  20 mg Oral Daily Astrid Martinez MD   20 mg at 06/09/25 0857    folic acid (FOLVITE) tablet 1 mg  1 mg Oral Daily Astrid Martinez MD   1 mg at 06/09/25 0857    sennosides-docusate sodium (SENOKOT-S) 8.6-50 MG tablet 1 tablet  1 tablet Oral Daily Astrid Martinez MD   1 tablet at 06/09/25 0857    piperacillin-tazobactam (ZOSYN) 3,375 mg in sodium chloride 0.9 % 50 mL IVPB (addEASE)  3,375 mg IntraVENous Q8H Astrid Martinez MD 12.5 mL/hr at 06/09/25 1438 3,375 mg at 06/09/25 1438    vancomycin (VANCOCIN) intermittent dosing (placeholder)   Other RX Placeholder Astrid Martinez MD        metoprolol tartrate (LOPRESSOR) tablet 12.5 mg  12.5 mg Oral BID Deedee Olivas PA-C   12.5 mg at 06/09/25 0857         Vital Signs:  BP (!) 121/57   Pulse 96   Temp (!) 101.3 °F (38.5 °C) (Axillary)   Resp 22   Ht 1.778 m (5' 10\")   Wt 79.4 kg (175 lb)   SpO2 99%   BMI 25.11 kg/m²  O2 Device: High flow nasal cannula      Physical exam:     GEN: Elderly man, looking tired and diaphoretic  HEENT: Masklike facies.  Oral mucosa
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
## 2025-06-09 NOTE — PLAN OF CARE
Problem: Physical Therapy - Adult  Goal: By Discharge: Performs mobility at highest level of function for planned discharge setting.  See evaluation for individualized goals.  Description: FUNCTIONAL STATUS PRIOR TO ADMISSION: Per old notes \"Per chart, patient was independent with wheelchair prior to having a stroke in December 2024 in R MCA territory. Patient has since been at SNF. Patient rather poor historian of recent events and unable to describe functional mobility status. A PT note on 1/7/25 at [facility] indicates Max to Total A x 2 for functional mobility.\" Pt has been at [facility] .    HOME SUPPORT PRIOR TO ADMISSION: The patient lived alone but has since been at [facility].    Physical Therapy Goals  Initiated 6/9/2025  1.  Patient will move from supine to sit and sit to supine in bed with maximal assistance within 7 day(s).    2.  Patient will tolerate sitting EOB for 1 min with fair sitting balance within 7 day(s).      Outcome: Not Progressing     PHYSICAL THERAPY EVALUATION    Patient: Yoig Lynne Sr. (70 y.o. male)  Date: 6/9/2025  Primary Diagnosis: SVT (supraventricular tachycardia) [I47.10]  GRACE (acute kidney injury) [N17.9]  Sepsis (HCC) [A41.9]  Severe sepsis (HCC) [A41.9, R65.20]  Aspiration pneumonia of both lungs, unspecified aspiration pneumonia type, unspecified part of lung (HCC) [J69.0]  Acute hypoxic respiratory failure (HCC) [J96.01]       Precautions: Restrictions/Precautions  Restrictions/Precautions: Bed Alarm, Fall Risk            ASSESSMENT :   DEFICITS/IMPAIRMENTS:   The patient is limited by decreased functional mobility, ROM, strength, activity tolerance, endurance, cognition, coordination, balance     Based on the impairments listed above pt is below functional baseline. Pt was received in supine on 15L and cleared by nursing to mobilize. He required significant assistance to mobilize to the EOB. Noted increased posterior lean and needed constant support to maintain midline.  [id number]; PMCID: [id number].  4. Chuy DE JESUS, Lisette S, William W, Carla OLIVARES. AM-PAC Short Forms Manual 4.0. Revised 2/2020.                                                                                                                                                                                                                                Pain Intervention(s):       Activity Tolerance:   Poor    After treatment:   Patient left in no apparent distress in bed, Call bell within reach, and Bed/ chair alarm activated    COMMUNICATION/EDUCATION:   The patient's plan of care was discussed with: occupational therapist and registered nurse    Patient Education  Education Given To: Patient  Education Provided: Role of Therapy;Plan of Care  Education Method: Verbal  Barriers to Learning: Cognition  Education Outcome: Continued education needed    Thank you for this referral.  Nikia Hoyt, PT, DPT  Minutes: 20      Physical Therapy Evaluation Charge Determination   History Examination Presentation Decision-Making   HIGH Complexity :3+ comorbidities / personal factors will impact the outcome/ POC  HIGH Complexity : 4+ Standardized tests and measures addressing body structure, function, activity limitation and / or participation in recreation  MEDIUM Complexity : Evolving with changing characteristics  AM-PAC  HIGH    Based on the above components, the patient evaluation is determined to be of the following complexity level: Medium

## 2025-06-09 NOTE — PROGRESS NOTES
Pharmacy Antimicrobial Kinetic Dosing    Indication for Antimicrobials: PNA (nosocomial)     Current Regimen of Each Antimicrobial:  Vancomycin Pharmacy to Dose; Start Date ; Day # 3  Zosyn 3.375 g IV Q8H; Start Date ; Day # 3    Previous Antimicrobial Therapy:   metronidazole IV x1   CTX x1     Goal Level: Vancomycin trough 15-20    Date Dose & Interval Measured (mcg/mL) Predicted AUC 24-48 Predicted AUC 24,ss    2000mg x1 10.3 (~21.5 hr level) N/a N/a                   Significant Cultures:    Blood, paired: in process    Labs:  Recent Labs     Units 25  0243 25  0158 25  2237 25  1723 25  1358   CREATININE MG/DL 1.11 1.38* 1.38*  --  1.98*   BUN MG/DL 39* 44* 45*  --  49*   PROCAL ng/mL  --   --   --  5.10  --    WBC K/uL 19.8* 17.2* 14.6*  --  20.9*   BANDS %  --  34  --   --  8     Temp (24hrs), Av °F (37.8 °C), Min:98.5 °F (36.9 °C), Max:103.4 °F (39.7 °C)      Conditions for Dosing Consideration:  GRACE    Creatinine Clearance (mL/min): Estimated Creatinine Clearance: 62 mL/min (based on SCr of 1.11 mg/dL).       Impression/Plan:   Scr trending down  Will continue with vancomycin 750 mg q 12 h  Predicted HLG24-84 = 458, Predicted AUC24,ss = 503  Vancomycin level on  AM  CBC & BMP ordered daily per protocol  Antimicrobial stop date 7 days     Pharmacy will follow daily and adjust medications as appropriate for renal function and/or serum levels.    Thank you,  Baldemar Dutton [facility]

## 2025-06-09 NOTE — PLAN OF CARE
Problem: Respiratory - Adult  Goal: Achieves optimal ventilation and oxygenation  6/8/2025 2213 by Herve Loza RN  Outcome: Progressing  6/8/2025 2018 by Maricarmen Mcgill RCP  Outcome: Progressing  6/8/2025 1042 by Anne Grider RN  Outcome: Progressing     Problem: Discharge Planning  Goal: Discharge to home or other facility with appropriate resources  6/8/2025 2213 by Herve Loza RN  Outcome: Progressing  6/8/2025 1042 by Anne Grider RN  Outcome: Progressing     Problem: Safety - Adult  Goal: Free from fall injury  6/8/2025 2213 by Herve Loza RN  Outcome: Progressing  6/8/2025 1042 by Anne Grider RN  Outcome: Progressing     Problem: Skin/Tissue Integrity  Goal: Skin integrity remains intact  Description: 1.  Monitor for areas of redness and/or skin breakdown2.  Assess vascular access sites hourly3.  Every 4-6 hours minimum:  Change oxygen saturation probe site4.  Every 4-6 hours:  If on nasal continuous positive airway pressure, respiratory therapy assess nares and determine need for appliance change or resting period  6/8/2025 2213 by Herve Loza RN  Outcome: Progressing  6/8/2025 1042 by Anne Grider RN  Outcome: Progressing     Problem: ABCDS Injury Assessment  Goal: Absence of physical injury  6/8/2025 2213 by Herve Loza RN  Outcome: Progressing  6/8/2025 1042 by Anne Grider RN  Outcome: Progressing

## 2025-06-10 PROBLEM — G20.A1 DEMENTIA DUE TO PARKINSON'S DISEASE (HCC): Status: ACTIVE | Noted: 2025-06-10

## 2025-06-10 PROBLEM — Z71.89 GOALS OF CARE, COUNSELING/DISCUSSION: Status: ACTIVE | Noted: 2025-06-10

## 2025-06-10 PROBLEM — Z51.5 PALLIATIVE CARE ENCOUNTER: Status: ACTIVE | Noted: 2025-06-10

## 2025-06-10 PROBLEM — R53.1 GENERALIZED WEAKNESS: Status: ACTIVE | Noted: 2025-06-10

## 2025-06-10 PROBLEM — F02.80 DEMENTIA DUE TO PARKINSON'S DISEASE (HCC): Status: ACTIVE | Noted: 2025-06-10

## 2025-06-10 PROBLEM — R06.02 SHORTNESS OF BREATH: Status: ACTIVE | Noted: 2025-06-10

## 2025-06-10 PROBLEM — Z66 DNR (DO NOT RESUSCITATE): Status: ACTIVE | Noted: 2025-06-10

## 2025-06-10 LAB
ALBUMIN SERPL-MCNC: 1.9 G/DL (ref 3.5–5)
ANION GAP SERPL CALC-SCNC: 3 MMOL/L (ref 2–12)
BASOPHILS # BLD: 0.04 K/UL (ref 0–0.1)
BASOPHILS NFR BLD: 0.2 % (ref 0–1)
BUN SERPL-MCNC: 33 MG/DL (ref 6–20)
BUN/CREAT SERPL: 25 (ref 12–20)
CALCIUM SERPL-MCNC: 9.3 MG/DL (ref 8.5–10.1)
CHLORIDE SERPL-SCNC: 124 MMOL/L (ref 97–108)
CO2 SERPL-SCNC: 23 MMOL/L (ref 21–32)
CREAT SERPL-MCNC: 1.31 MG/DL (ref 0.7–1.3)
DIFFERENTIAL METHOD BLD: ABNORMAL
EOSINOPHIL # BLD: 0.02 K/UL (ref 0–0.4)
EOSINOPHIL NFR BLD: 0.1 % (ref 0–7)
ERYTHROCYTE [DISTWIDTH] IN BLOOD BY AUTOMATED COUNT: 15 % (ref 11.5–14.5)
GLUCOSE SERPL-MCNC: 115 MG/DL (ref 65–100)
HCT VFR BLD AUTO: 33.4 % (ref 36.6–50.3)
HGB BLD-MCNC: 10.7 G/DL (ref 12.1–17)
IMM GRANULOCYTES # BLD AUTO: 0.22 K/UL (ref 0–0.04)
IMM GRANULOCYTES NFR BLD AUTO: 1 % (ref 0–0.5)
L PNEUMO1 AG UR QL IA: NEGATIVE
LACTATE SERPL-SCNC: 1.4 MMOL/L (ref 0.4–2)
LYMPHOCYTES # BLD: 1.47 K/UL (ref 0.8–3.5)
LYMPHOCYTES NFR BLD: 6.5 % (ref 12–49)
MAGNESIUM SERPL-MCNC: 2.4 MG/DL (ref 1.6–2.4)
MCH RBC QN AUTO: 31.2 PG (ref 26–34)
MCHC RBC AUTO-ENTMCNC: 32 G/DL (ref 30–36.5)
MCV RBC AUTO: 97.4 FL (ref 80–99)
MONOCYTES # BLD: 1 K/UL (ref 0–1)
MONOCYTES NFR BLD: 4.4 % (ref 5–13)
NEUTS SEG # BLD: 19.82 K/UL (ref 1.8–8)
NEUTS SEG NFR BLD: 87.8 % (ref 32–75)
NRBC # BLD: 0 K/UL (ref 0–0.01)
NRBC BLD-RTO: 0 PER 100 WBC
PHOSPHATE SERPL-MCNC: 2 MG/DL (ref 2.6–4.7)
PLATELET # BLD AUTO: 389 K/UL (ref 150–400)
PMV BLD AUTO: 10.1 FL (ref 8.9–12.9)
POTASSIUM SERPL-SCNC: 3.4 MMOL/L (ref 3.5–5.1)
PROCALCITONIN SERPL-MCNC: 4.12 NG/ML
RBC # BLD AUTO: 3.43 M/UL (ref 4.1–5.7)
SODIUM SERPL-SCNC: 150 MMOL/L (ref 136–145)
SPECIMEN SOURCE: NORMAL
WBC # BLD AUTO: 22.6 K/UL (ref 4.1–11.1)

## 2025-06-10 PROCEDURE — 99232 SBSQ HOSP IP/OBS MODERATE 35: CPT

## 2025-06-10 PROCEDURE — 2060000000 HC ICU INTERMEDIATE R&B

## 2025-06-10 PROCEDURE — 36415 COLL VENOUS BLD VENIPUNCTURE: CPT

## 2025-06-10 PROCEDURE — 6370000000 HC RX 637 (ALT 250 FOR IP): Performed by: STUDENT IN AN ORGANIZED HEALTH CARE EDUCATION/TRAINING PROGRAM

## 2025-06-10 PROCEDURE — 6370000000 HC RX 637 (ALT 250 FOR IP)

## 2025-06-10 PROCEDURE — 2500000003 HC RX 250 WO HCPCS: Performed by: STUDENT IN AN ORGANIZED HEALTH CARE EDUCATION/TRAINING PROGRAM

## 2025-06-10 PROCEDURE — 6360000002 HC RX W HCPCS: Performed by: INTERNAL MEDICINE

## 2025-06-10 PROCEDURE — 2580000003 HC RX 258: Performed by: STUDENT IN AN ORGANIZED HEALTH CARE EDUCATION/TRAINING PROGRAM

## 2025-06-10 PROCEDURE — 83735 ASSAY OF MAGNESIUM: CPT

## 2025-06-10 PROCEDURE — 2700000000 HC OXYGEN THERAPY PER DAY

## 2025-06-10 PROCEDURE — 6370000000 HC RX 637 (ALT 250 FOR IP): Performed by: INTERNAL MEDICINE

## 2025-06-10 PROCEDURE — 99497 ADVNCD CARE PLAN 30 MIN: CPT

## 2025-06-10 PROCEDURE — 6360000002 HC RX W HCPCS: Performed by: STUDENT IN AN ORGANIZED HEALTH CARE EDUCATION/TRAINING PROGRAM

## 2025-06-10 PROCEDURE — 85025 COMPLETE CBC W/AUTO DIFF WBC: CPT

## 2025-06-10 PROCEDURE — 80069 RENAL FUNCTION PANEL: CPT

## 2025-06-10 PROCEDURE — 83605 ASSAY OF LACTIC ACID: CPT

## 2025-06-10 PROCEDURE — 92526 ORAL FUNCTION THERAPY: CPT

## 2025-06-10 PROCEDURE — 6370000000 HC RX 637 (ALT 250 FOR IP): Performed by: PHYSICIAN ASSISTANT

## 2025-06-10 PROCEDURE — 84145 PROCALCITONIN (PCT): CPT

## 2025-06-10 PROCEDURE — 2580000003 HC RX 258: Performed by: INTERNAL MEDICINE

## 2025-06-10 PROCEDURE — 6360000002 HC RX W HCPCS: Performed by: PHYSICIAN ASSISTANT

## 2025-06-10 RX ORDER — POTASSIUM CHLORIDE 7.45 MG/ML
10 INJECTION INTRAVENOUS
Status: COMPLETED | OUTPATIENT
Start: 2025-06-10 | End: 2025-06-10

## 2025-06-10 RX ORDER — ASPIRIN 81 MG/1
81 TABLET, CHEWABLE ORAL DAILY
Status: DISCONTINUED | OUTPATIENT
Start: 2025-06-11 | End: 2025-06-11

## 2025-06-10 RX ORDER — METOPROLOL TARTRATE 25 MG/1
12.5 TABLET, FILM COATED ORAL 2 TIMES DAILY
Status: DISCONTINUED | OUTPATIENT
Start: 2025-06-10 | End: 2025-06-11

## 2025-06-10 RX ORDER — ATORVASTATIN CALCIUM 40 MG/1
80 TABLET, FILM COATED ORAL DAILY
Status: DISCONTINUED | OUTPATIENT
Start: 2025-06-11 | End: 2025-06-11

## 2025-06-10 RX ORDER — AMIODARONE HYDROCHLORIDE 200 MG/1
200 TABLET ORAL 2 TIMES DAILY
Status: DISCONTINUED | OUTPATIENT
Start: 2025-06-10 | End: 2025-06-11

## 2025-06-10 RX ORDER — POLYETHYLENE GLYCOL 3350 17 G/17G
17 POWDER, FOR SOLUTION ORAL DAILY
Status: DISCONTINUED | OUTPATIENT
Start: 2025-06-10 | End: 2025-06-11

## 2025-06-10 RX ADMIN — POLYETHYLENE GLYCOL 3350 17 G: 17 POWDER, FOR SOLUTION ORAL at 16:31

## 2025-06-10 RX ADMIN — SODIUM CHLORIDE, PRESERVATIVE FREE 5 ML: 5 INJECTION INTRAVENOUS at 21:00

## 2025-06-10 RX ADMIN — POTASSIUM CHLORIDE 10 MEQ: 7.46 INJECTION, SOLUTION INTRAVENOUS at 12:15

## 2025-06-10 RX ADMIN — POTASSIUM CHLORIDE 10 MEQ: 7.46 INJECTION, SOLUTION INTRAVENOUS at 11:20

## 2025-06-10 RX ADMIN — ASPIRIN 81 MG: 81 TABLET, CHEWABLE ORAL at 09:02

## 2025-06-10 RX ADMIN — ENOXAPARIN SODIUM 80 MG: 100 INJECTION SUBCUTANEOUS at 08:59

## 2025-06-10 RX ADMIN — SODIUM CHLORIDE, PRESERVATIVE FREE 10 ML: 5 INJECTION INTRAVENOUS at 10:13

## 2025-06-10 RX ADMIN — LANSOPRAZOLE 30 MG: 30 TABLET, ORALLY DISINTEGRATING, DELAYED RELEASE ORAL at 09:00

## 2025-06-10 RX ADMIN — CARBIDOPA AND LEVODOPA 1 TABLET: 25; 100 TABLET ORAL at 14:20

## 2025-06-10 RX ADMIN — ATORVASTATIN CALCIUM 80 MG: 40 TABLET, FILM COATED ORAL at 09:00

## 2025-06-10 RX ADMIN — ENOXAPARIN SODIUM 80 MG: 100 INJECTION SUBCUTANEOUS at 21:30

## 2025-06-10 RX ADMIN — AMIODARONE HYDROCHLORIDE 200 MG: 200 TABLET ORAL at 21:44

## 2025-06-10 RX ADMIN — CARBIDOPA AND LEVODOPA 1 TABLET: 25; 100 TABLET ORAL at 09:00

## 2025-06-10 RX ADMIN — POTASSIUM CHLORIDE 10 MEQ: 7.46 INJECTION, SOLUTION INTRAVENOUS at 13:09

## 2025-06-10 RX ADMIN — PIPERACILLIN AND TAZOBACTAM 3375 MG: 3; .375 INJECTION, POWDER, LYOPHILIZED, FOR SOLUTION INTRAVENOUS at 06:31

## 2025-06-10 RX ADMIN — FOLIC ACID 1 MG: 1 TABLET ORAL at 08:59

## 2025-06-10 RX ADMIN — VANCOMYCIN HYDROCHLORIDE 750 MG: 750 INJECTION, POWDER, LYOPHILIZED, FOR SOLUTION INTRAVENOUS at 11:32

## 2025-06-10 RX ADMIN — METOPROLOL TARTRATE 12.5 MG: 25 TABLET ORAL at 08:59

## 2025-06-10 RX ADMIN — AMIODARONE HYDROCHLORIDE 200 MG: 200 TABLET ORAL at 11:15

## 2025-06-10 RX ADMIN — DEXTROSE MONOHYDRATE, SODIUM CHLORIDE, AND POTASSIUM CHLORIDE: 50; 4.5; 1.49 INJECTION, SOLUTION INTRAVENOUS at 12:13

## 2025-06-10 RX ADMIN — DOCUSATE SODIUM 50 MG AND SENNOSIDES 8.6 MG 1 TABLET: 8.6; 5 TABLET, FILM COATED ORAL at 09:00

## 2025-06-10 RX ADMIN — PIPERACILLIN AND TAZOBACTAM 3375 MG: 3; .375 INJECTION, POWDER, LYOPHILIZED, FOR SOLUTION INTRAVENOUS at 15:01

## 2025-06-10 RX ADMIN — VANCOMYCIN HYDROCHLORIDE 750 MG: 750 INJECTION, POWDER, LYOPHILIZED, FOR SOLUTION INTRAVENOUS at 23:02

## 2025-06-10 RX ADMIN — POTASSIUM PHOSPHATE, MONOBASIC AND POTASSIUM PHOSPHATE, DIBASIC 30 MMOL: 224; 236 INJECTION, SOLUTION, CONCENTRATE INTRAVENOUS at 10:12

## 2025-06-10 RX ADMIN — METOPROLOL TARTRATE 12.5 MG: 25 TABLET, FILM COATED ORAL at 21:43

## 2025-06-10 RX ADMIN — FLUOXETINE 20 MG: 20 LIQUID ORAL at 09:00

## 2025-06-10 RX ADMIN — PIPERACILLIN AND TAZOBACTAM 3375 MG: 3; .375 INJECTION, POWDER, LYOPHILIZED, FOR SOLUTION INTRAVENOUS at 22:55

## 2025-06-10 RX ADMIN — AMIODARONE HYDROCHLORIDE 0.5 MG/MIN: 50 INJECTION, SOLUTION INTRAVENOUS at 01:42

## 2025-06-10 RX ADMIN — POTASSIUM CHLORIDE 10 MEQ: 7.46 INJECTION, SOLUTION INTRAVENOUS at 12:55

## 2025-06-10 RX ADMIN — CARBIDOPA AND LEVODOPA 1 TABLET: 25; 100 TABLET ORAL at 21:20

## 2025-06-10 NOTE — PROGRESS NOTES
End of Shift Note    Bedside shift change report given to GUEVARA Gomez (oncoming nurse) by Cheryl Miguel RN (offgoing nurse).  Report included the following information SBAR, Kardex, Intake/Output, MAR, Recent Results, and Cardiac Rhythm      Shift worked:  [id number]     Shift summary and any significant changes:     Hemodynamically stable overnight. No cardiac arrythmias overnight. K 3.4, Phos 2.0. Very restless.     Concerns for physician to address:  None     Zone phone for oncoming shift:          Activity:  Level of Assistance: Dependent, patient does less than 25%  Number times ambulated in hallways past shift: 0  Number of times OOB to chair past shift: 0    Cardiac:   Cardiac Monitoring: Yes      Cardiac Rhythm: Sinus rhythm    Access:  Current line(s): PIV     Genitourinary:   Urinary Status: Voiding, External catheter    Respiratory:   O2 Device: High flow nasal cannula  Chronic home O2 use?: N/A  Incentive spirometer at bedside: YES    GI:     Current diet:  Diet NPO Exceptions are: Ice Chips  Passing flatus: YES    Pain Management:   Patient states pain is manageable on current regimen: YES    Skin:  Conrado Scale Score: 11  Interventions: Wound Offloading (Prevention Methods): Bed, pressure reduction mattress, Elevate heels, Turning    Patient Safety:  Fall Risk: Nursing Judgement-Fall Risk High(Add Comments): Yes  Fall Risk Interventions  Nursing Judgement-Fall Risk High(Add Comments): Yes  Toilet Every 2 Hours-In Advance of Need: Yes  Hourly Visual Checks: Confused, In bed  Fall Visual Posted: Armband, Socks, Fall sign posted  Room Door Open: Yes  Alarm On: Bed  Patient Moved Closer to Nursing Station: No    Active Consults:   IP CONSULT TO PALLIATIVE CARE  IP CONSULT TO PHARMACY  IP CONSULT TO CARDIOLOGY  IP CONSULT TO GI  IP CONSULT TO PULMONOLOGY  IP CONSULT TO INFECTIOUS DISEASES  IP CONSULT TO GI    Length of Stay:  Expected LOS: 4  Actual LOS: 3    Cheryl Miguel RN

## 2025-06-10 NOTE — PLAN OF CARE
Speech LAnguage Pathology TREATMENT    Patient: Yogi Lynne . (70 y.o. male)  Date: 6/10/2025  Primary Diagnosis: SVT (supraventricular tachycardia) [I47.10]  GRACE (acute kidney injury) [N17.9]  Sepsis (HCC) [A41.9]  Severe sepsis (HCC) [A41.9, R65.20]  Aspiration pneumonia of both lungs, unspecified aspiration pneumonia type, unspecified part of lung (HCC) [J69.0]  Acute hypoxic respiratory failure (HCC) [J96.01]       Precautions:  Bed Alarm, Fall Risk                  ASSESSMENT :  Session targeting swallow re-assessment to determine PO readiness. Patient's brother present at bedside and reports patient previously on mechanical soft diet with mildly thick liquids while at [facility]. He states the SLP at the facility did trial thin liquids but every time they transitioned to thin liquids, the patient would develop congestion and changes in respiratory status. The team at [facility] suspected chronic aspiration. Current CT Chest this admission revealed: \"Bilateral lower lobe airspace opacity with patchy airspace opacity in the right middle lobe and right upper lobe\". Patient with improved level of alertness and PO acceptance with date. SLP presented trials of ice chips, thin via spoon, mildly thick via spoon/straw. Patient with intermittent cough following ice chips (1/4 trials) and thin liquids (2/3 trials). Intermittent coughing following mildly thick liquid trials. Suspect current presentation likely acute on chronic and negatively impacted by acute illness. Discussed with patient and patient's brother. Education re: risks of aspiration and importance of oral hygiene reviewed. SLP will continue to follow.     Patient will benefit from skilled intervention to address the above impairments.     PLAN :  Recommendations and Planned Interventions:  Given dx of Parkinson's disease with recent CVA (R MCA)/baseline dysphagia and poor rehab potentional, the following options were discussed:    1) NPO with  Advance to least restrictive diet without signs or symptoms of aspiration for planned discharge setting.  See evaluation for individualized goals.  Description: Speech Pathology Goals Initiated 6/9/25    1.  Patient will tolerate ice chips free of s/s aspiration within 7 days.  2. Added 6/10/2025 Patient will participate in clinical swallow re-assessment within 7 days   Outcome: Progressing

## 2025-06-10 NOTE — PLAN OF CARE
Problem: Respiratory - Adult  Goal: Achieves optimal ventilation and oxygenation  6/10/2025 0451 by Cheryl Miguel RN  Outcome: Progressing  6/9/2025 1635 by Jeanmarie August RT  Outcome: Progressing  6/9/2025 1554 by Anne Grider RN  Outcome: Progressing  Flowsheets (Taken 6/9/2025 0845 by Melani Rosas RN)  Achieves optimal ventilation and oxygenation:   Assess for changes in respiratory status   Assess for changes in mentation and behavior   Assess and instruct to report shortness of breath or any respiratory difficulty     Problem: Discharge Planning  Goal: Discharge to home or other facility with appropriate resources  6/9/2025 1554 by Anne Grider RN  Outcome: Progressing  Flowsheets (Taken 6/9/2025 0845 by Melani Rosas RN)  Discharge to home or other facility with appropriate resources: Identify barriers to discharge with patient and caregiver     Problem: Safety - Adult  Goal: Free from fall injury  6/10/2025 0451 by Cheryl Miguel RN  Outcome: Progressing  6/9/2025 1554 by Anne Grider RN  Outcome: Progressing     Problem: Skin/Tissue Integrity  Goal: Skin integrity remains intact  Description: 1.  Monitor for areas of redness and/or skin breakdown2.  Assess vascular access sites hourly3.  Every 4-6 hours minimum:  Change oxygen saturation probe site4.  Every 4-6 hours:  If on nasal continuous positive airway pressure, respiratory therapy assess nares and determine need for appliance change or resting period  6/10/2025 0451 by Cheryl Miguel RN  Outcome: Progressing  6/9/2025 1554 by Anne Grider RN  Outcome: Progressing  Flowsheets (Taken 6/9/2025 0845 by Melani Rosas RN)  Skin Integrity Remains Intact: Monitor for areas of redness and/or skin breakdown     Problem: ABCDS Injury Assessment  Goal: Absence of physical injury  6/9/2025 1554 by Anne Grider RN  Outcome: Progressing     Problem: Neurosensory - Adult  Goal: Absence of seizures  6/9/2025 1554 by Anne Grider
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
## 2025-06-10 NOTE — PROGRESS NOTES
Comprehensive Nutrition Assessment    Type and Reason for Visit:  Initial, Wound    Nutrition Recommendations/Plan:   Diet per SLP  Replete low electrolytes prior to starting TF  Resume tube feeds: start Jevity 1.5 at 10mL/hr, advance 10mL Q 8hr to goal rate 60mL/hr + 150mL flush Q 4hr. Goal rate will provide 2160 kcals, 92g pro, 311g CHO, 1994mL. Water flushes may increase as IVF decreases.   Replete K and Phos. Continue to monitor electrolytes and replete as needed.      Malnutrition Assessment:  Malnutrition Status:  Insufficient data (06/10/25 1045)    Context:  Acute Illness     Findings of the 6 clinical characteristics of malnutrition:  Energy Intake:  Unable to assess  Weight Loss:  Mild weight loss     Body Fat Loss:  Unable to assess     Muscle Mass Loss:  Unable to assess    Fluid Accumulation:  No fluid accumulation     Strength:  Not Performed    Nutrition Assessment:     Chart reviewed for documented wounds. Pt admitted with severe sepsis, possible aspiration pneumonia, likely NSTEMI, GRACE, hypernatremia, hypokalemia, hypophosphatemia, Parkinson's disease, PEG tube, HLD, HTN. Pt unavailable during multiple attempts to visit, being seen by other providers. Okay per attending to resume tube feedings. GI following and evaluating potential PEG tube issues. Will continue monitoring.     Wt Readings from Last 5 Encounters:   06/07/25 79.4 kg (175 lb)   02/02/25 82.7 kg (182 lb 5.1 oz)   01/24/25 84.2 kg (185 lb 10 oz)   ]    Nutrition Related Findings:    Labs: Na 150, Cr 1.31, BUN 33, K 3.4, Phos 2.0.   Meds: aspirin, lipitor, folic acid, lansoprazole, zosyn, KCl, K Phos, senokot, vancomycin, D5 1/2NS with KCl.   BM PTA.   Wound Type: Wound Consult Pending, Stage II, Pressure Injury       Current Nutrition Intake & Therapies:    Average Meal Intake: NPO  Average Supplements Intake: NPO  Diet NPO Exceptions are: Ice Chips  ADULT TUBE FEEDING; PEG; Standard with Fiber; Continuous; 10; Yes; 10; Q 8 hours;  60; 150; Q 4 hours  DIET ONE TIME MESSAGE;    Anthropometric Measures:  Height: 177.8 cm (5' 10\")  Ideal Body Weight (IBW): 166 lbs (75 kg)       Current Body Weight: 79.4 kg (175 lb), 105.4 % IBW. Weight Source: Not specified  Current BMI (kg/m2): 25.1           Weight Adjustment For: No Adjustment                 BMI Categories: Overweight (BMI 25.0-29.9)    Estimated Daily Nutrient Needs:  Energy Requirements Based On: Kcal/kg  Weight Used for Energy Requirements: Current  Energy (kcal/day): 8000-8597 kcals (25-30 kcals/kg)  Weight Used for Protein Requirements: Current  Protein (g/day): 95-111g (1.2-1.4g/kg)  Method Used for Fluid Requirements: 1 ml/kcal  Fluid (ml/day): 2200mL    Nutrition Diagnosis:   Inadequate oral intake related to cognitive or neurological impairment, swallowing difficulty as evidenced by nutrition support - enteral nutrition    Nutrition Interventions:   Food and/or Nutrient Delivery: Start Tube Feeding  Nutrition Education/Counseling: No recommendation at this time  Coordination of Nutrition Care: Continue to monitor while inpatient, Speech Therapy       Goals:  Goals: Meet at least 75% of estimated needs, by next RD assessment          Nutrition Monitoring and Evaluation:   Behavioral-Environmental Outcomes: None Identified  Food/Nutrient Intake Outcomes: Diet Advancement/Tolerance, Enteral Nutrition Intake/Tolerance, IVF Intake  Physical Signs/Symptoms Outcomes: Biochemical Data, Chewing or Swallowing, GI Status, Fluid Status or Edema, Nutrition Focused Physical Findings, Skin, Weight    Discharge Planning:    Too soon to determine     Annie Hernandez RD  Contact: [phone]

## 2025-06-10 NOTE — ACP (ADVANCE CARE PLANNING)
Advance Care Planning      Palliative Medicine Provider (MD/NP)  Advance Care Planning (ACP) Conversation      Date of Conversation: 06/10/25  The patient and/or authorized decision maker consented to a voluntary Advance Care Planning conversation.   Individuals present for the conversation:   Patient and Legal healthcare agent named below    Legal Healthcare Agent(s):    Primary Decision Maker: Rai Lynne - Brother/Sister - [phone]    Secondary Decision Maker: Barrera Snider - Other Relative - [phone]    ACP documents available in EMR prior to discussion:  -Power of  for Healthcare  -Living Will    Primary Palliative Diagnosis(es):  Parkinson's Disease    Conversation Summary:  Code Status:  -We talked about resuscitation status, and you stated that patient was already elected “Do Not Attempt Resuscitation” status and signed the DNR which he believes [facility] or [facility] have on file.    DNR document signed again today and scanned to EMR    Discussed that a resuscitation attempt is unlikely to be effective or beneficial for patient and that having a DNR would allow him/her to pass as naturally and as comfortably as possible without the pain of CPR.      Resuscitation Status:    Code Status: DNR    Outcomes / Completed Documentation:  An explanation of advance directives and their importance was provided and the following forms completed:    -Portable DNR    If new document completed, original was provided to patient and/or family member.    Copy was placed for scanning into the [facility] EMR.      I spent 30 minutes providing separately identifiable ACP services with the patient and/or surrogate decision maker in a voluntary, in-person conversation discussing the patient's wishes and goals as detailed in the above note.       Gil Lewis, APRN - CNP

## 2025-06-10 NOTE — PROGRESS NOTES
Pulmonary Progress Note    Patient: Yogi Lynne Sr.                     YOB: 1955        Date- 6/10/2025                           Admit Date: 6/7/2025       CC: Follow up for acute respiratory failure and pneumonia    IMPRESSION & PLAN:     Acute hypoxemic respiratory failure   Bilateral pneumonia, presumed aspiration  Septic shock, resolved  A-fib with RVR  Lactic acidosis  Parkinson's disease  DNR      Plan    Hypoxia requiring up to 15 L HFNC, currently on 5L.  Titrate oxygen to keep minimum sat 92%  CTA chest 6/8  bilateral pneumonia most pronounced in RLL. Strongly suspect aspiration.   Abd CT 6/9 shows dense infiltrates in lower lungs, right more than left and infiltrates in right upper and middle lobes  Urine Legionella antigen negative, blood cultures no growth so far, on vancomycin and Zosyn, lactic acidosis and pro calcitonin improving.   Continue chest PT  Dysphagia with recurrent aspirations, on maintenance fluids.  Hematemesis, GI felt not stable for EGD  Palliative care meeting with family.  Management of other conditions as per primary team    Spoke with speech therapy and palliative care.     Medical Decision Making : Moderate complexity problems, high amount of data reviewed and high risk management       Subjective:    Interval history    6/9- Feels terrible.  RRT x 2 today.  Febrile and tachycardic.  O2 needs slightly improved, currently on 12 LPM.  Respiratory viral panel negative.  No oral diet due to recurrent aspirations.  GI saw for hematemesis, EGD deferred due to marked anemia.  On Amio drip for atrial arrhythmia.    6/10-Oxygen needs improved, on O2 5 LPM, no cardiac arrhythmias last night.  SLP evaluation underway.  Legionella antigen negative      HPI: Yogi Lynne is a 70 y.o.  male with PMHx significant chronic artery disease, hypertension, Parkinson disease, mood disorder presented to the emergency department from the rehab center acute respiratory

## 2025-06-10 NOTE — PROGRESS NOTES
End of Shift Note    Bedside shift change report given to Cheryl (oncoming nurse) by Teddy Rowell RN (offgoing nurse).  Report included the following information SBAR, Intake/Output, MAR, Recent Results, Cardiac Rhythm DR, Quality Measures, and Dual Neuro Assessment    Shift worked:  7-1900     Shift summary and any significant changes:     Amiodarone changed to PO  Nasal and oropharyngeal bleed- MD aware  Tube feeding started at 10 ml/hr- rate to be changed at 2100  Bowel movement - twice today     Concerns for physician to address:  ?     Zone phone for oncoming shift:   x       Activity:  Level of Assistance: Dependent, patient does less than 25%  Number times ambulated in hallways past shift: 0  Number of times OOB to chair past shift: 0    Cardiac:   Cardiac Monitoring: Yes      Cardiac Rhythm: Sinus rhythm    Access:  Current line(s): PIV     Genitourinary:   Urinary Status: Voiding, External catheter    Respiratory:   O2 Device: Nasal cannula  Chronic home O2 use?: NO  Incentive spirometer at bedside: NO    GI:  Last BM (including prior to admit): 06/10/25  Current diet:  Diet NPO Exceptions are: Ice Chips  ADULT TUBE FEEDING; PEG; Standard with Fiber; Continuous; 10; Yes; 10; Q 8 hours; 60; 150; Q 4 hours  DIET ONE TIME MESSAGE;  Passing flatus: YES    Pain Management:   Patient states pain is manageable on current regimen: N/A    Skin:  Conrado Scale Score: 12  Interventions: Wound Offloading (Prevention Methods): Bed, pressure redistribution/air, Bed, pressure reduction mattress, Pillows, Turning    Patient Safety:  Fall Risk: Nursing Judgement-Fall Risk High(Add Comments): Yes  Fall Risk Interventions  Nursing Judgement-Fall Risk High(Add Comments): Yes  Toilet Every 2 Hours-In Advance of Need: Yes  Hourly Visual Checks: Awake  Fall Visual Posted: Armband, Socks  Room Door Open: Yes  Alarm On: Bed  Patient Moved Closer to Nursing Station: No    Active Consults:   IP CONSULT TO PALLIATIVE CARE  IP

## 2025-06-10 NOTE — PROGRESS NOTES
Ayana Medeiros PA-C                       [phone] cell                      Friday 7:30 am- 4:30 pm         GI PROGRESS NOTE        NAME:   Yogi Lynne Sr.       :    1955       MRN:    [id number]     Assessment/Plan     2025 Consult Note by Dr. Stroud:  Yogi is a 70 y.o.   male who I was asked to see for blood per os w/ nml hgb now w/ resp failure/hypoxia/aspiration      He is medically complex pt who cannot give a detailed hx.     Has a hx of polio muscular atrophy, Parkinson disease, CAD w/ trop >2k  , severe AS and AI and atrial flutter   Previous  PEG placement.   Aspiration noted on CT 2024 and now 2025.     CXR 25:  Bilateral lung infiltrates, right greater than left. Correlate for infection  versus asymmetric pulmonary edema.       Latest Reference Range & Units 25 13:58 25 22:37 25 01:58   Hemoglobin Quant 12.1 - 17.0 g/dL 13.9 13.0 12.7   Hematocrit 36.6 - 50.3 % 43.7 40.9 40.3         2025: Patient sitting upright in bed. Says 'yes' to having abdominal pain and chest pain but not able to elaborate. His RN who has resumed care states she was told he had an episode of vomiting yesterday but not sure if there was any blood in vomit. No reports of blood in stool. Per cardiology note, pt is poor candidate for invasive procedures currently.           Recent Labs     25  0243 25  0158 25  2237   WBC 19.8* 17.2* 14.6*   HGB 10.1* 12.7 13.0   HCT 31.7* 40.3 40.9   MCV 96.4 97.8 96.7    368 372      CT Result (most recent):  CTA CHEST W WO CONTRAST 2025     Narrative  EXAM:  CTA CHEST W WO CONTRAST     INDICATION: Shortness of breath, tachycardia, and hypotension.     COMPARISON: CT angiography chest on 2025.     TECHNIQUE: Helical thin section chest CT following intravenous administration of  nonionic contrast 100 mL of  COMMENTS: N/A    Impression  1. Bilateral lower lobe airspace opacity with patchy airspace opacity in the  right middle lobe and right upper lobe as well. Findings are consistent  multifocal pneumonia. Consider aspiration pneumonia.    2. No acute intra-abdominal pathology.    Electronically signed by Pa Daniel        Medications Reviewed    PMH/ reviewed - no change compared to H&P  Date/Time:  6/10/2025

## 2025-06-10 NOTE — CARE COORDINATION
1132 - [facility] verified that patient is under LTC.    1444 to 1446 - Brother, Rai, at bedside is interested in speaking with [facility].  Referral sent and they will forward to their liaisons.  Message sent to [facility] regarding potential return with hospice.  Rai also mentioned that [facility] is looking into starting a Medicaid application for the patient.  [facility] LTC is agreeable to patient returning with [facility].      Bethany Virk, MSN, RN    Care Management  [phone]

## 2025-06-10 NOTE — PROGRESS NOTES
1438: Pt was assisted assisted by multiple staff members to airbed to aid sacral wound healing. Pt. Noted to have nasal bleed L nare with more drainage than R. Packed. MD made aware.   1640: Nasal bleed ceased. Continued to humidify oxygen.   Tube feeding was started at 14:50 @ 10 ml/hr per order.   Small bowel movement today.   Amiodarone changed to PO- transitioned from IV to PO today- pt in SR

## 2025-06-10 NOTE — PROGRESS NOTES
Hospitalist Progress Note    NAME:   Yogi Lynne .   : 1955   MRN: [id number]     Date/Time: 6/10/2025 8:21 AM  Patient PCP: Lino Regan DO    Estimated discharge date:   Barriers: needs improvement in sepsis , hr , respiration , slp , cardio , hospice consulted     Assessment / Plan:  Severe sepsis with septic shock possibly secondary to aspiration pneumonia versus community-acquired pneumonia  Acute hypoxic respiratory failure due to above  Lactic acidosis  Leukocytosis  Patient admitted to stepdown unit with telemetry  Chest x-ray on 2025-bilateral lung infiltrates, right greater than left.   Lactic acid 5.11---> 2.34>> 2.5>1.8  Procalcitonin 5.1  WBC trend 20.9>> 14.6>>1 7.2>>19.8  CTA chest with and without contrast-done on 2025-severe bilateral pneumonia greatest in the right lower lobe possible superimposed pulmonary edema no pulm embolism, tracheal debris suggest possible aspiration  Respiratory viral panel negative  On new oxygen demand of 15 L HF   Follow-up blood culture, Legionella  Status post vancomycin and ceftriaxone, and metronidazole in the emergency department  Continue vancomycin and Zosyn  DuoNebs every 4 hours  Chest physiotherapy- ONCE ABLE   Continue on supplemental oxygen, with goal above 92%  Will keep patient n.p.o.-follow-up SLP note-advised for n.p.o. frequent oral care occasional ice chips.  PT OT evaluated the patient advised for SNF  Continue to wean off oxygen as much as possible  Pulmonology was consulted advised to continue above management.  Infectious disease consulted for nonimprovement of symptoms despite being on antibiotic  Continue maintenance fluid  Patient is at high risk of decompensation despite treatment-discussed with brother Rai at the bedside.  He is agreeable for hospice consult.  Palliative consulted.    A-fib with RVR  Possibly due to underlying severe sepsis  Likely NSTEMI  [id number]-EHMu score-2  Severe aortic stenosis  EKG on   --    INR 1.1  --   --   --   --     < > = values in this interval not displayed.       Signed: Emily Sparks MD

## 2025-06-10 NOTE — PROGRESS NOTES
Palliative Medicine  Patient Name: Yogi Lynne Sr.  YOB: 1955  MRN: [id number]  Age: 70 y.o.  Gender: male    Date of Initial Consult: 6/9/2025  Date of Service: 6/10/2025  Time: 12:31 PM  Provider: ELLIE Restrepo - CNP  Hospital Day: 4  Admit Date: 6/7/2025  Referring Provider: Celso Gonzalez MD      Reasons for Consultation:  Goals of Care    HISTORY OF PRESENT ILLNESS (HPI):   Yogi Lynne Sr. is a 70 y.o. male with a past medical history of chronic artery disease, hypertension, Parkinson disease, mood disorder, who was admitted on 6/7/2025 from [facility] with complaints of acute respiratory distress.     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  His brother Rai, is his medical power of .      PALLIATIVE DIAGNOSES:    Severe sepsis 2/2 aspiration pneumonia  Shortness of breath - O2 @ 15L  Dementia/parkinson's  DNR  Generalized weakness  Goals of care  Palliative care encounter      ASSESSMENT AND PLAN:   Today, I am following up with Yogi Lynne to address goals of care.  Reviewed medical chart including admit H&P, consultant notes, MAR, and recent labs/imaging.  Mr. Lynne was seen and evaluated, brother, Rai at bedside. Introduced self and role of palliative.   At time of my arrival, he was resting in bed, noted to be lethargic, but in no acute distress. Dietician at bedside assessing patient swallowing. Patient is awake, but not able to participate in meaningful discussion.  Patient consistently asking for water to drink - it has been explained that he is not currently clear to have anything to drink by mouth.  Discussed patient's condition with Rai in detail - noting that dietician recommending using his PEG tube for medications, and only bits of ice chips by mouth as he remains a high risk for aspiration.   Rai reported that patient have been declining since the passing of his wife, Kaylin 7 years ago,

## 2025-06-10 NOTE — PROGRESS NOTES
Cardiology Progress Note  6/10/2025     Admit Date: 6/7/2025  Admit Diagnosis: SVT (supraventricular tachycardia) [I47.10]  GRACE (acute kidney injury) [N17.9]  Sepsis (HCC) [A41.9]  Severe sepsis (HCC) [A41.9, R65.20]  Aspiration pneumonia of both lungs, unspecified aspiration pneumonia type, unspecified part of lung (HCC) [J69.0]  Acute hypoxic respiratory failure ([facility]) [J96.01]  CC: none currently  Cardiologist:  [facility] [place]   Cardiac Assessment/Plan:    1) CAD: heavy cor ca on CT 12/2024; Pt denies eval/Rx CAD.         *NQWMI (trop 2k) 6/2025 c/w demand ischemia from hypotension/AS  2) AS/AI: severe AS (mean 43); mild-mod AI 12/2024 @ [facility] [place]: he had a outpt f/u appt there but didn't have family with him so nothing was done.  3) Atypical atrial flutter POA 6/7/25: spont to NSR. Reported SVT 1/2025 @ [facility] [place].  4) PFO, probable on echo 12/2024  5) HTN     6) Fall; MCA CVA 12/2024, [facility] [place]: had PEG placed.  7) Aspiration on CT @ [facility] [place] 12/2024; & 6/2025.  8) Prior EtOH abuse  9) H/o Polio muscular atrophy; Parkinson disease  10) Resp failure/hypoxia/hypotension/aspiration/GRACE 6/2025.  11) DNR/I     Admitted after resp failure w/ marked hypoxia/hypotension; Prob recurrent aspiration; GRACE  Atrial flutter POA: to sinus tachycardia; Trop 2ks.  Cardiac meds 6/8: dilt gtt; asa; lipitor 80; lovenox 80 bid; metoprolol 12.5 bid;      Unable to take PO meds per nursing.     Rec 6/8: D/C dilt; Amio (IV until able to take PO or by PEG); Metoprolol IV if not taking PO/PEG.  Echo     Not currently a candidate for invasive cardiac testing/Rx w/ ongoing ID issues. Can have EGD if needed from cardiac standpoint (avoid hypotension w/ AS).     Echo 6/9/25:    Left Ventricle: Normal left ventricular systolic function with a visually estimated EF of 65 - 70%. EF by visual approximation is 65%. Left ventricle size is normal. Normal wall motion.    Right Ventricle: Right ventricle size is normal. Normal systolic function.    Aortic Valve:  ondansetron (ZOFRAN) injection 4 mg  4 mg IntraVENous Q6H PRN    polyethylene glycol (GLYCOLAX) packet 17 g  17 g Oral Daily PRN    acetaminophen (TYLENOL) tablet 650 mg  650 mg Oral Q6H PRN    Or    acetaminophen (TYLENOL) suppository 650 mg  650 mg Rectal Q6H PRN    aspirin chewable tablet 81 mg  81 mg Oral Daily    atorvastatin (LIPITOR) tablet 80 mg  80 mg Oral Daily    lansoprazole (PREVACID SOLUTAB) disintegrating tablet 30 mg  30 mg Oral Daily    FLUoxetine (PROzac) 20 MG/5ML solution 20 mg  20 mg Oral Daily    folic acid (FOLVITE) tablet 1 mg  1 mg Oral Daily    sennosides-docusate sodium (SENOKOT-S) 8.6-50 MG tablet 1 tablet  1 tablet Oral Daily    piperacillin-tazobactam (ZOSYN) 3,375 mg in sodium chloride 0.9 % 50 mL IVPB (addEASE)  3,375 mg IntraVENous Q8H    vancomycin (VANCOCIN) intermittent dosing (placeholder)   Other RX Placeholder    metoprolol tartrate (LOPRESSOR) tablet 12.5 mg  12.5 mg Oral BID        Shashi Galeas MD

## 2025-06-11 ENCOUNTER — HOSPITAL ENCOUNTER (INPATIENT)
Facility: HOSPITAL | Age: 70
LOS: 1 days | DRG: 951 | End: 2025-06-12
Attending: INTERNAL MEDICINE | Admitting: INTERNAL MEDICINE
Payer: MEDICARE

## 2025-06-11 VITALS
TEMPERATURE: 98.4 F | DIASTOLIC BLOOD PRESSURE: 52 MMHG | OXYGEN SATURATION: 92 % | BODY MASS INDEX: 22.12 KG/M2 | HEART RATE: 88 BPM | SYSTOLIC BLOOD PRESSURE: 152 MMHG | HEIGHT: 70 IN | RESPIRATION RATE: 25 BRPM | WEIGHT: 154.54 LBS

## 2025-06-11 PROBLEM — J96.01 ACUTE HYPOXIC RESPIRATORY FAILURE (HCC): Status: ACTIVE | Noted: 2025-06-11

## 2025-06-11 LAB
ALBUMIN SERPL-MCNC: 1.6 G/DL (ref 3.5–5)
ANION GAP SERPL CALC-SCNC: 7 MMOL/L (ref 2–12)
BASOPHILS # BLD: 0.03 K/UL (ref 0–0.1)
BASOPHILS NFR BLD: 0.1 % (ref 0–1)
BUN SERPL-MCNC: 24 MG/DL (ref 6–20)
BUN/CREAT SERPL: 18 (ref 12–20)
CALCIUM SERPL-MCNC: 8.1 MG/DL (ref 8.5–10.1)
CHLORIDE SERPL-SCNC: 126 MMOL/L (ref 97–108)
CO2 SERPL-SCNC: 20 MMOL/L (ref 21–32)
CREAT SERPL-MCNC: 1.36 MG/DL (ref 0.7–1.3)
DIFFERENTIAL METHOD BLD: ABNORMAL
EKG ATRIAL RATE: 178 BPM
EKG DIAGNOSIS: NORMAL
EKG Q-T INTERVAL: 296 MS
EKG QRS DURATION: 90 MS
EKG QTC CALCULATION (BAZETT): 421 MS
EKG R AXIS: -34 DEGREES
EKG T AXIS: 150 DEGREES
EKG VENTRICULAR RATE: 122 BPM
EOSINOPHIL # BLD: 0.04 K/UL (ref 0–0.4)
EOSINOPHIL NFR BLD: 0.2 % (ref 0–7)
ERYTHROCYTE [DISTWIDTH] IN BLOOD BY AUTOMATED COUNT: 15 % (ref 11.5–14.5)
GLUCOSE SERPL-MCNC: 117 MG/DL (ref 65–100)
HCT VFR BLD AUTO: 30.8 % (ref 36.6–50.3)
HGB BLD-MCNC: 10.1 G/DL (ref 12.1–17)
IMM GRANULOCYTES # BLD AUTO: 0.24 K/UL (ref 0–0.04)
IMM GRANULOCYTES NFR BLD AUTO: 1.2 % (ref 0–0.5)
LYMPHOCYTES # BLD: 1.77 K/UL (ref 0.8–3.5)
LYMPHOCYTES NFR BLD: 8.5 % (ref 12–49)
MAGNESIUM SERPL-MCNC: 2.2 MG/DL (ref 1.6–2.4)
MCH RBC QN AUTO: 31.5 PG (ref 26–34)
MCHC RBC AUTO-ENTMCNC: 32.8 G/DL (ref 30–36.5)
MCV RBC AUTO: 96 FL (ref 80–99)
MONOCYTES # BLD: 0.85 K/UL (ref 0–1)
MONOCYTES NFR BLD: 4.1 % (ref 5–13)
NEUTS SEG # BLD: 17.83 K/UL (ref 1.8–8)
NEUTS SEG NFR BLD: 85.9 % (ref 32–75)
NRBC # BLD: 0 K/UL (ref 0–0.01)
NRBC BLD-RTO: 0 PER 100 WBC
PHOSPHATE SERPL-MCNC: 3.1 MG/DL (ref 2.6–4.7)
PLATELET # BLD AUTO: 359 K/UL (ref 150–400)
PMV BLD AUTO: 10.2 FL (ref 8.9–12.9)
POTASSIUM SERPL-SCNC: 3.9 MMOL/L (ref 3.5–5.1)
RBC # BLD AUTO: 3.21 M/UL (ref 4.1–5.7)
SODIUM SERPL-SCNC: 153 MMOL/L (ref 136–145)
VANCOMYCIN SERPL-MCNC: 20 UG/ML
WBC # BLD AUTO: 20.8 K/UL (ref 4.1–11.1)

## 2025-06-11 PROCEDURE — 6370000000 HC RX 637 (ALT 250 FOR IP): Performed by: STUDENT IN AN ORGANIZED HEALTH CARE EDUCATION/TRAINING PROGRAM

## 2025-06-11 PROCEDURE — 6370000000 HC RX 637 (ALT 250 FOR IP): Performed by: INTERNAL MEDICINE

## 2025-06-11 PROCEDURE — 99233 SBSQ HOSP IP/OBS HIGH 50: CPT

## 2025-06-11 PROCEDURE — 2580000003 HC RX 258: Performed by: HOSPITALIST

## 2025-06-11 PROCEDURE — 83735 ASSAY OF MAGNESIUM: CPT

## 2025-06-11 PROCEDURE — 85025 COMPLETE CBC W/AUTO DIFF WBC: CPT

## 2025-06-11 PROCEDURE — 2500000003 HC RX 250 WO HCPCS: Performed by: STUDENT IN AN ORGANIZED HEALTH CARE EDUCATION/TRAINING PROGRAM

## 2025-06-11 PROCEDURE — 6360000002 HC RX W HCPCS: Performed by: INTERNAL MEDICINE

## 2025-06-11 PROCEDURE — 6360000002 HC RX W HCPCS

## 2025-06-11 PROCEDURE — 2060000000 HC ICU INTERMEDIATE R&B

## 2025-06-11 PROCEDURE — 80202 ASSAY OF VANCOMYCIN: CPT

## 2025-06-11 PROCEDURE — 80069 RENAL FUNCTION PANEL: CPT

## 2025-06-11 PROCEDURE — 2500000003 HC RX 250 WO HCPCS: Performed by: INTERNAL MEDICINE

## 2025-06-11 PROCEDURE — 2700000000 HC OXYGEN THERAPY PER DAY

## 2025-06-11 PROCEDURE — 6560000002 HC HOSPICE GENERAL INPATIENT

## 2025-06-11 PROCEDURE — 36415 COLL VENOUS BLD VENIPUNCTURE: CPT

## 2025-06-11 PROCEDURE — 1100000000 HC RM PRIVATE

## 2025-06-11 PROCEDURE — 6360000002 HC RX W HCPCS: Performed by: PHYSICIAN ASSISTANT

## 2025-06-11 PROCEDURE — 6360000002 HC RX W HCPCS: Performed by: STUDENT IN AN ORGANIZED HEALTH CARE EDUCATION/TRAINING PROGRAM

## 2025-06-11 PROCEDURE — 92526 ORAL FUNCTION THERAPY: CPT

## 2025-06-11 PROCEDURE — 2580000003 HC RX 258: Performed by: STUDENT IN AN ORGANIZED HEALTH CARE EDUCATION/TRAINING PROGRAM

## 2025-06-11 RX ORDER — HYDROMORPHONE HYDROCHLORIDE 1 MG/ML
1 INJECTION, SOLUTION INTRAMUSCULAR; INTRAVENOUS; SUBCUTANEOUS
Status: DISCONTINUED | OUTPATIENT
Start: 2025-06-11 | End: 2025-06-12

## 2025-06-11 RX ORDER — GLYCOPYRROLATE 0.2 MG/ML
0.2 INJECTION INTRAMUSCULAR; INTRAVENOUS
Status: DISCONTINUED | OUTPATIENT
Start: 2025-06-11 | End: 2025-06-12 | Stop reason: HOSPADM

## 2025-06-11 RX ORDER — DIAZEPAM 10 MG/2ML
5 INJECTION, SOLUTION INTRAMUSCULAR; INTRAVENOUS
Status: DISCONTINUED | OUTPATIENT
Start: 2025-06-11 | End: 2025-06-12

## 2025-06-11 RX ORDER — ATROPINE SULFATE 10 MG/ML
2 SOLUTION/ DROPS OPHTHALMIC
Status: DISCONTINUED | OUTPATIENT
Start: 2025-06-11 | End: 2025-06-12 | Stop reason: HOSPADM

## 2025-06-11 RX ORDER — HALOPERIDOL 5 MG/ML
1 INJECTION INTRAMUSCULAR
Status: DISCONTINUED | OUTPATIENT
Start: 2025-06-11 | End: 2025-06-11 | Stop reason: HOSPADM

## 2025-06-11 RX ORDER — SODIUM CHLORIDE 0.9 % (FLUSH) 0.9 %
5-40 SYRINGE (ML) INJECTION EVERY 12 HOURS SCHEDULED
Status: DISCONTINUED | OUTPATIENT
Start: 2025-06-11 | End: 2025-06-12 | Stop reason: HOSPADM

## 2025-06-11 RX ORDER — ACETAMINOPHEN 650 MG/1
650 SUPPOSITORY RECTAL EVERY 4 HOURS PRN
Status: DISCONTINUED | OUTPATIENT
Start: 2025-06-11 | End: 2025-06-12 | Stop reason: HOSPADM

## 2025-06-11 RX ORDER — METOPROLOL TARTRATE 25 MG/1
25 TABLET, FILM COATED ORAL 2 TIMES DAILY WITH MEALS
Status: DISCONTINUED | OUTPATIENT
Start: 2025-06-11 | End: 2025-06-11

## 2025-06-11 RX ORDER — DEXTROSE MONOHYDRATE 50 MG/ML
INJECTION, SOLUTION INTRAVENOUS CONTINUOUS
Status: DISCONTINUED | OUTPATIENT
Start: 2025-06-11 | End: 2025-06-11

## 2025-06-11 RX ORDER — BISACODYL 10 MG
10 SUPPOSITORY, RECTAL RECTAL DAILY PRN
Status: DISCONTINUED | OUTPATIENT
Start: 2025-06-11 | End: 2025-06-12 | Stop reason: HOSPADM

## 2025-06-11 RX ORDER — KETOROLAC TROMETHAMINE 30 MG/ML
15 INJECTION, SOLUTION INTRAMUSCULAR; INTRAVENOUS EVERY 6 HOURS PRN
Status: DISCONTINUED | OUTPATIENT
Start: 2025-06-11 | End: 2025-06-12

## 2025-06-11 RX ORDER — SCOPOLAMINE 1 MG/3D
1 PATCH, EXTENDED RELEASE TRANSDERMAL
Status: DISCONTINUED | OUTPATIENT
Start: 2025-06-11 | End: 2025-06-12 | Stop reason: HOSPADM

## 2025-06-11 RX ORDER — GLYCOPYRROLATE 0.2 MG/ML
0.1 INJECTION INTRAMUSCULAR; INTRAVENOUS
Status: DISCONTINUED | OUTPATIENT
Start: 2025-06-11 | End: 2025-06-11 | Stop reason: HOSPADM

## 2025-06-11 RX ORDER — DIAZEPAM 10 MG/2ML
2.5 INJECTION, SOLUTION INTRAMUSCULAR; INTRAVENOUS EVERY 30 MIN PRN
Status: DISCONTINUED | OUTPATIENT
Start: 2025-06-11 | End: 2025-06-11 | Stop reason: HOSPADM

## 2025-06-11 RX ADMIN — SODIUM CHLORIDE, PRESERVATIVE FREE 10 ML: 5 INJECTION INTRAVENOUS at 20:37

## 2025-06-11 RX ADMIN — CARBIDOPA AND LEVODOPA 1 TABLET: 25; 100 TABLET ORAL at 08:59

## 2025-06-11 RX ADMIN — HYDROMORPHONE HYDROCHLORIDE 0.25 MG: 1 INJECTION, SOLUTION INTRAMUSCULAR; INTRAVENOUS; SUBCUTANEOUS at 16:44

## 2025-06-11 RX ADMIN — DEXTROSE MONOHYDRATE, SODIUM CHLORIDE, AND POTASSIUM CHLORIDE: 50; 4.5; 1.49 INJECTION, SOLUTION INTRAVENOUS at 00:10

## 2025-06-11 RX ADMIN — DIAZEPAM 5 MG: 5 INJECTION, SOLUTION INTRAMUSCULAR; INTRAVENOUS at 17:58

## 2025-06-11 RX ADMIN — GLYCOPYRROLATE 0.2 MG: 0.2 INJECTION INTRAMUSCULAR; INTRAVENOUS at 20:37

## 2025-06-11 RX ADMIN — PIPERACILLIN AND TAZOBACTAM 3375 MG: 3; .375 INJECTION, POWDER, LYOPHILIZED, FOR SOLUTION INTRAVENOUS at 06:37

## 2025-06-11 RX ADMIN — FOLIC ACID 1 MG: 1 TABLET ORAL at 08:59

## 2025-06-11 RX ADMIN — HYDROMORPHONE HYDROCHLORIDE 1 MG: 1 INJECTION, SOLUTION INTRAMUSCULAR; INTRAVENOUS; SUBCUTANEOUS at 18:00

## 2025-06-11 RX ADMIN — DEXTROSE: 5 SOLUTION INTRAVENOUS at 08:57

## 2025-06-11 RX ADMIN — ASPIRIN 81 MG: 81 TABLET, CHEWABLE ORAL at 08:58

## 2025-06-11 RX ADMIN — GLYCOPYRROLATE 0.2 MG: 0.2 INJECTION INTRAMUSCULAR; INTRAVENOUS at 17:58

## 2025-06-11 RX ADMIN — DIAZEPAM 2.5 MG: 5 INJECTION, SOLUTION INTRAMUSCULAR; INTRAVENOUS at 16:43

## 2025-06-11 RX ADMIN — AMIODARONE HYDROCHLORIDE 200 MG: 200 TABLET ORAL at 08:58

## 2025-06-11 RX ADMIN — ENOXAPARIN SODIUM 80 MG: 100 INJECTION SUBCUTANEOUS at 08:58

## 2025-06-11 RX ADMIN — GLYCOPYRROLATE 0.1 MG: 0.2 INJECTION INTRAMUSCULAR; INTRAVENOUS at 15:45

## 2025-06-11 RX ADMIN — METOPROLOL TARTRATE 25 MG: 25 TABLET, FILM COATED ORAL at 08:58

## 2025-06-11 RX ADMIN — KETOROLAC TROMETHAMINE 15 MG: 30 INJECTION, SOLUTION INTRAMUSCULAR at 21:41

## 2025-06-11 RX ADMIN — HYDROMORPHONE HYDROCHLORIDE 1 MG: 1 INJECTION, SOLUTION INTRAMUSCULAR; INTRAVENOUS; SUBCUTANEOUS at 20:38

## 2025-06-11 RX ADMIN — FLUOXETINE 20 MG: 20 LIQUID ORAL at 08:59

## 2025-06-11 RX ADMIN — HYDROMORPHONE HYDROCHLORIDE 0.25 MG: 1 INJECTION, SOLUTION INTRAMUSCULAR; INTRAVENOUS; SUBCUTANEOUS at 15:46

## 2025-06-11 RX ADMIN — DIAZEPAM 5 MG: 5 INJECTION, SOLUTION INTRAMUSCULAR; INTRAVENOUS at 20:38

## 2025-06-11 RX ADMIN — ATORVASTATIN CALCIUM 80 MG: 40 TABLET, FILM COATED ORAL at 08:59

## 2025-06-11 RX ADMIN — LANSOPRAZOLE 30 MG: 30 TABLET, ORALLY DISINTEGRATING, DELAYED RELEASE ORAL at 08:58

## 2025-06-11 RX ADMIN — DIAZEPAM 2.5 MG: 5 INJECTION, SOLUTION INTRAMUSCULAR; INTRAVENOUS at 15:46

## 2025-06-11 RX ADMIN — SODIUM CHLORIDE, PRESERVATIVE FREE 10 ML: 5 INJECTION INTRAVENOUS at 08:59

## 2025-06-11 NOTE — H&P
[facility]   Good Help to Those in Need  [phone]     Patient Name:  Yogi Lynne Sr.  YOB: 1955         Date of Provider Hospice Visit: 06/11/25     Level of Care:   [x] General Inpatient (GIP)              [] Routine                   [] Respite     Current Location of Care:  [] [facility]           [] [facility]         [x] [facility]        [] [facility]           [] [facility]ospice House ([facility])     IF [facility], patient referred from:  [] [facility]           [] [facility]         [] [facility]        [] [facility]           [] Home         [] Other:      Date of Original Hospice Admission:  6/11/2025  5:30 PM   Hospice Medical Director at time of admission: Dr. Shaun Gr     Principle Hospice Diagnosis:   Acute hypoxic respiratory failure (HCC) [J96.01]   Diagnoses RELATED to the terminal prognosis: Dysphagia, Parkinson's, coronary disease, pneumonia, septic shock, atrial fibrillation  Other Diagnoses:      HOSPICE SUMMARY   Yogi Lynne Sr.    70-year-old male with past medical history significant for hypertension, polio, Parkinson's, mood disorder, coronary artery disease admitted to hospice due to acute hypoxic respiratory failure.  Patient was admitted to the hospital with acute hypoxic respiratory failure with septic shock due to aspiration pneumonia.  Patient had CTA of the chest which showed severe bilateral pneumonia greatest right lower lobe as well as pulmonary edema tracheal debris.  Patient required high flow oxygen. Patient received IV antibiotic treatment.  Patient was evaluated by pulmonary and infectious disease.  Noted no improvement of symptoms despite being on antibiotics.  Patient also had A-fib with RVR and likely non-ST elevation MI with underlying severe aortic stenosis.  Evaluated by cardiology placed on diltiazem drip.  As well as IV beta-blocker.  Notably with electrolyte imbalances.  Overall poor prognosis decision for comfort measures.  PPS 20%.  Patient admitted GIP for management of dyspnea, secretions,  poliomyelitis 1954    no residual problems    Hx of rheumatic fever     hx of rheumatic fever x 2 in childhood    Other ill-defined conditions(799.89)     surgery left hand removed distal to PIP on ring finger          PHYSICAL EXAM      Wt Readings from Last 3 Encounters:   06/11/25 70.1 kg (154 lb 8.7 oz)   02/02/25 82.7 kg (182 lb 5.1 oz)   01/24/25 84.2 kg (185 lb 10 oz)       Height: 1.778 m (5' 10\"), Weight - Scale: 70.1 kg (154 lb 8.7 oz), BP: (!) 152/52, Pain 0-10: Location: sacrum;     Supplemental O2         [x] Yes                        [] NO  Last bowel movement:      Currently this patient has:  [x] Peripheral IV          [] PICC           [] PORT        [] ICD               [] Caal Catheter       [] NG Tube     [] PEG Tube               [] Rectal Tube           [] Drain  [] Other:      PPS 20%  Cardiovascular: Tachycardia  Respiratory: Tachypnea, rhonchi, secretions with use of accessory muscles of breathing.  Gastrointestinal: Soft positive bowel sounds PEG tube present  Musculoskeletal: Musculoskeletal wasting  Skin: Mottling/cool bilateral lower extremities  Psychiatric: Agitated     Pertinent Lab and or Imaging Tests:  Lab Results   Component Value Date     (H) 06/11/2025    K 3.9 06/11/2025     (H) 06/11/2025    CO2 20 (L) 06/11/2025    BUN 24 (H) 06/11/2025    CREATININE 1.36 (H) 06/11/2025    GLUCOSE 117 (H) 06/11/2025    CALCIUM 8.1 (L) 06/11/2025    BILITOT 1.4 (H) 06/07/2025    ALKPHOS 100 06/07/2025    AST 69 (H) 06/07/2025    ALT 19 06/07/2025    LABGLOM 55 (L) 06/11/2025    AGRATIO 0.9 (L) 03/22/2023    GLOB 6.4 (H) 06/07/2025                Brother updated.  All questions answered.

## 2025-06-11 NOTE — PROGRESS NOTES
Occupational Therapy note:    Chart reviewed and discussed patient in IDRs. Patient admitted from LTC and plan to transition to hospice services. He has no additional acute OT needs. Will complete order and sign off.     America Saravia, OTR/L, OTD

## 2025-06-11 NOTE — PROGRESS NOTES
Pharmacy Antimicrobial Kinetic Dosing    Indication for Antimicrobials: PNA (nosocomial)     Current Regimen of Each Antimicrobial:  Vancomycin Pharmacy to Dose; Start Date /; Day # 5  Zosyn 3.375 g IV Q8H; Start Date ; Day # 5    Previous Antimicrobial Therapy:   metronidazole IV x1  6/7 CTX x1     Goal Level: Vancomycin trough 15-20    Date Dose & Interval Measured (mcg/mL) Predicted AUC 24-48 Predicted AUC 24,ss    2000mg x1 10.3 (~21.5 hr level) N/a N/a    750 mg q 12 h 20 543 568            Significant Cultures:    Blood, paired: in process    Labs:  Recent Labs     Units 25  0423 06/10/25  0544 25  0243   CREATININE MG/DL 1.36* 1.31* 1.11   BUN MG/DL 24* 33* 39*   PROCAL ng/mL  --  4.12  --    WBC K/uL 20.8* 22.6* 19.8*     Temp (24hrs), Av.4 °F (36.9 °C), Min:97.5 °F (36.4 °C), Max:98.8 °F (37.1 °C)      Conditions for Dosing Consideration:  GRACE    Creatinine Clearance (mL/min): Estimated Creatinine Clearance: 49 mL/min (A) (based on SCr of 1.36 mg/dL (H)).       Impression/Plan:   Scr was trending down and now trending up  Afebrile  Will continue with vancomycin 750 mg q 12 h  Predicted PNX43-74 = 543, Predicted AUC24,ss = 568  CBC & BMP ordered daily per protocol  Antimicrobial stop date 7 days     Pharmacy will follow daily and adjust medications as appropriate for renal function and/or serum levels.    Thank you,  Baldemar Dutton, [facility]

## 2025-06-11 NOTE — DISCHARGE SUMMARY
Discharge Summary    Name: Yogi Lynne Sr.  [id number]  YOB: 1955 (Age: 70 y.o.)   Date of Admission: 6/7/2025  Date of Discharge: 6/11/2025  Attending Physician: Jim Trinh MD    Discharge Diagnosis:     Consultations:  IP CONSULT TO PALLIATIVE CARE  IP CONSULT TO CARDIOLOGY  IP CONSULT TO GI  IP CONSULT TO PULMONOLOGY  IP CONSULT TO INFECTIOUS DISEASES  IP CONSULT TO GI  IP CONSULT TO HOSPICE  IP CONSULT TO CASE MANAGEMENT      Brief Admission History/Reason for Admission Per Astrid Martinez MD:   Yogi Lynne is a 70 y.o.  male with PMHx significant chronic artery disease, hypertension, Parkinson disease, mood disorder presented to the emergency department from the rehab center acute respiratory distress.      In the emergency department patient was found to be hypoxic to 6 L, his heart rate was in 200.  Respiratory rate was about 30.  Blood pressure was 60/30.      Labs consistent with elevated lactic acid to 5.11, and WBC 20.9 K. Admission creatinine 1.98.  Baseline creatinine 0.72.      Patient reported having palpitation and shortness of breath.  Not able to answer all the questions.  Was not fully oriented     We were asked to admit for work up and evaluation of the above problems.     Brief Hospital Course by Main Problems:   Severe sepsis with septic shock possibly secondary to aspiration pneumonia versus community-acquired pneumonia  Acute hypoxic respiratory failure due to above  Lactic acidosis  Leukocytosis  Patient admitted to stepdown unit with telemetry  Chest x-ray on 6/7/2025-bilateral lung infiltrates, right greater than left.   Lactic acid 5.11---> 2.34>> 2.5>1.8  Procalcitonin 5.1  WBC trend 20.9>> 14.6>>1 7.2>>19.8  CTA chest with and without contrast-done on 6/8/2025-severe bilateral pneumonia greatest in the right lower lobe possible superimposed pulmonary edema no pulm embolism, tracheal debris suggest possible  24 92 % --       Gen:    Lethargic  Chest: Clear lungs  CVS:   Regular rhythm.  No edema  Abd:  Soft, not distended, not tender.  PEG tube  Neuro: Lethargic    Discharge/Recent Laboratory Results:  Recent Labs     06/11/25  0423   *   K 3.9   *   CO2 20*   BUN 24*   CREATININE 1.36*   GLUCOSE 117*   CALCIUM 8.1*   PHOS 3.1   MG 2.2     Recent Labs     06/11/25  0423   HGB 10.1*   HCT 30.8*   WBC 20.8*          Discharge Medications:     Medication List        STOP taking these medications      aspirin 81 MG chewable tablet     atorvastatin 80 MG tablet  Commonly known as: LIPITOR     carbidopa-levodopa  MG per tablet  Commonly known as: SINEMET     esomeprazole Magnesium 20 MG Pack  Commonly known as: NEXIUM     FLUoxetine 20 MG/5ML solution  Commonly known as: PROzac     folic acid 1 MG tablet  Commonly known as: FOLVITE     ketoconazole 2 % cream  Commonly known as: NIZORAL     losartan 25 MG tablet  Commonly known as: COZAAR     melatonin 3 MG Tabs tablet     metoprolol succinate 25 MG extended release tablet  Commonly known as: TOPROL XL     sennosides-docusate sodium 8.6-50 MG tablet  Commonly known as: SENOKOT-S            ASK your doctor about these medications      ipratropium 0.5 mg-albuterol 2.5 mg 0.5-2.5 (3) MG/3ML Soln nebulizer solution  Commonly known as: DUONEB                  DISPOSITION:    Home with Family:       Home with HH/PT/OT/RN:    SNF/LTC:    STEPHANIE:    OTHER: Hospice           Code status: DNR  Recommended diet: regular diet  Recommended activity: activity as tolerated  Wound care: None      Follow up with:   PCP : Lino Regan DO    No follow-up provider specified.        Total time in minutes spent coordinating this discharge (includes going over instructions, follow-up, prescriptions, and preparing report for sign off to her PCP) :  35 minutes

## 2025-06-11 NOTE — PLAN OF CARE
Speech LAnguage Pathology TREATMENT    Patient: Yogi Lynne  (70 y.o. male)  Date: 6/11/2025  Primary Diagnosis: SVT (supraventricular tachycardia) [I47.10]  GRACE (acute kidney injury) [N17.9]  Sepsis (HCC) [A41.9]  Severe sepsis (HCC) [A41.9, R65.20]  Aspiration pneumonia of both lungs, unspecified aspiration pneumonia type, unspecified part of lung (HCC) [J69.0]  Acute hypoxic respiratory failure (HCC) [J96.01]       Precautions:  Bed Alarm, Fall Risk                  ASSESSMENT :  Session targeting dysphagia management and determination of instrumental readiness. Patient alert, irritable this date and agreeable to working with SLP following education and encouragement. Note patient pulled NGT last night. Baseline breathy vocal quality and intermittent wheezing noted and continues throughout session. SLP presented trials of ice chips, thin via spoon/straw, and puree. Delayed throat clearing intermittently noted. Given prolonged NPO status, s/s aspiration noted at bedside, and breathy vocal quality/wheezing, recommend FEES to further evaluate swallow function prior to diet initiation. Patient initially refusing but eventually agrees following education from SLP.     Patient will benefit from skilled intervention to address the above impairments.     PLAN :  Recommendations and Planned Interventions:  Diet: NPO with exception of ice chips following oral hygiene   --Medications crushed in puree (I.e. applesauce), if approved by pharmacy   --Upright all PO intake   --Strict oral hygiene 2-3x/day  --Complete FEES to further evaluate swallow function        Recommend next SLP session: complete FEES     Acute SLP Services: Yes, SLP will continue to follow per plan of care.  Discharge Recommendations: Continue to assess pending progress     SUBJECTIVE:   Patient stated, “Forget it.”    OBJECTIVE:     Past Medical History:   Diagnosis Date    Arthritis     Hiccups 4/27/2011    narcisa and referred for upper GI:              Motor Speech:         Language Comprehension and Expression:                   Neuro-Linguistics:                      Voice:       Respiratory Status/Airway:  Room air                         After treatment:   Patient left in no apparent distress in bed, Call bell left within reach, and Nursing notified    COMMUNICATION/EDUCATION:   Patient was educated regarding role of SLP and aspiration precautions.  He demonstrated Fair understanding as evidenced by Limited understanding/response due to cognitive status.     The patient's plan of care including recommendations, planned interventions, and recommended diet changes were discussed with: Registered nurse    Patient understands intent and goals of therapy, but is neutral about his/her participation    Thank you,  Alise Dow, SLP    SLP Individual Minutes  Time In: 0904  Time Out: 0916  Minutes: 12     Problem: SLP Adult - Impaired Swallowing  Goal: By Discharge: Advance to least restrictive diet without signs or symptoms of aspiration for planned discharge setting.  See evaluation for individualized goals.  Description: Speech Pathology Goals Initiated 6/9/25    1.  Patient will tolerate ice chips free of s/s aspiration within 7 days.  2. Added 6/10/2025 Patient will participate in clinical swallow re-assessment within 7 days   3. Added 6/11/2025 Patient will participate in instrumental swallow study to further evaluate swallow function within 7 days.   Outcome: Progressing

## 2025-06-11 NOTE — PROGRESS NOTES
Spiritual Health History and Assessment/Progress Note  [facility]    Attempted Encounter,  ,  ,      Name: Yogi Lynne Sr. MRN: [id number]    Age: 70 y.o.     Sex: male   Language: English   Scientology: Scientology   Sepsis (HCC)     Date: 6/11/2025            Total Time Calculated: 7 min              Spiritual Assessment began in [facility] 2 CARDIAC MEDICAL STEP DOWN        Referral/Consult From: Rounding   Encounter Overview/Reason: Attempted Encounter  Service Provided For: Patient not available    Lety, Belief, Meaning:   Patient unable to assess at this time  Family/Friends No family/friends present      Importance and Influence:  Patient unable to assess at this time  Family/Friends No family/friends present    Community:  Patient Other: Unable to assess; pt. was sleeping  Family/Friends No family/friends present    Assessment and Plan of Care:     Patient Interventions include: Other: Unable to assess; pt. was sleeping  Family/Friends Interventions include: No family/friends present    Patient Plan of Care: Spiritual Care available upon further referral  Family/Friends Plan of Care: Spiritual Care available upon further referral    Electronically signed by Yenifer Herzog,  Intern on 6/11/2025 at 4:31 PM
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
## 2025-06-11 NOTE — PROGRESS NOTES
Palliative Medicine  Patient Name: Yogi Lynne Sr.  YOB: 1955  MRN: [id number]  Age: 70 y.o.  Gender: male    Date of Initial Consult: 6/9/2025  Date of Service: 6/11/2025  Time: 11:50 AM  Provider: ELLIE Restrepo - CNP  Hospital Day: 5  Admit Date: 6/7/2025  Referring Provider: Celso Gonzalez MD      Reasons for Consultation:  Goals of Care    HISTORY OF PRESENT ILLNESS (HPI):   Yogi Lynne Sr. is a 70 y.o. male with a past medical history of chronic artery disease, hypertension, Parkinson disease, mood disorder, who was admitted on 6/7/2025 from [facility] with complaints of acute respiratory distress.     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  His brother Rai, is his medical power of .      PALLIATIVE DIAGNOSES:    Severe sepsis 2/2 aspiration pneumonia  Shortness of breath - O2 @ 15L  Dementia/parkinson's  DNR  Generalized weakness  Goals of care  Palliative care encounter      ASSESSMENT AND PLAN:   Today, I am following up with Yogi Lynne to address goals of care.  Reviewed medical chart including admit H&P, consultant notes, MAR, and recent labs/imaging.  Mr. Lynne was seen and evaluated, brother, Rai at bedside. Introduced self and role of palliative.   At time of my arrival, he was resting in bed - ill appearing, and grimacing  Detailed discussion with brother, Rai - he noted the overall decline and agree to transition to comfort care  He noted that he has already spoke with Woodrow, with hospice and is hoping to meet with her again later today to see if he meets qualifications for GIP admission.  We discussed stopping all measures and transitioning patient to comfort - brother is in agreement.    Decisions/Goals:   Transition to comfort care with the support of hospice    Comfort Measures  Advised family no further labs, testing, scans.  Enter Hospice referral to   End of life measures  Add meds  Jainism concerns:  [] Yes /  [x] No   If \"Yes\" to discuss with pastoral care during IDT     Does caregiver feel burdened by caring for their loved one:   [] Yes /  [x] No /  [] No Caregiver Present/Available [] No Caregiver [] Pt Lives at Facility  If \"Yes\" to discuss with social work during IDT    Anticipatory grief assessment:   [x] Normal  / [] Maladaptive     If \"Maladaptive\" to discuss with social work during IDT    ESAS Anxiety:      ESAS Depression:          LAB AND IMAGING FINDINGS:   Objective data reviewed:  labs, images, records, medication use, vitals, and chart     FINAL COMMENTS   Thank you for allowing Palliative Medicine to participate in the care of Yogi Lynne Sr..    Only check if applicable and billing time based rather than MDM  [] The total encounter time on this service date was  minutes which was spent performing a face-to-face encounter and personally completing the provider-level activities documented in the note. This includes time spent prior to the visit and after the visit in direct care of the patient. This time does not include time spent in any separately reportable services.    Electronically signed by   ELLIE Restrepo CNP  Palliative Care Team  on 6/11/2025 at 11:50 AM

## 2025-06-11 NOTE — PROGRESS NOTES
sacral pressure ulcers POA   Wound care following   Dressing as per wound care recs      Medical Decision Making:   I personally reviewed labs: CBC BMP phosphorus  I personally reviewed imaging:NONE   I personally reviewed EKG:NONE   Toxic drug monitoring:MONITOR BLEEDING ON ENOXA   Discussed case with: PATIENT , RN      Code Status: DNR/DNI   DVT Prophylaxis: Lovenox  Baseline: indep , lives by himself     Subjective:     Chief Complaint / Reason for Physician Visit  Patient currently admitted for severe sepsis likely due to aspiration pneumonia.  He also started developing A-fib with RVR.  Patient is a poor historian  Objective patient examined overnight events noted.  Patient appeared to be comfortable however slightly tachypneic.    Objective:     VITALS:   Last 24hrs VS reviewed since prior progress note. Most recent are:  Patient Vitals for the past 24 hrs:   BP Temp Temp src Pulse Resp SpO2 Weight   06/11/25 1502 -- -- -- 88 (!) 45 92 % --   06/11/25 1118 -- -- -- 78 (!) 43 93 % --   06/11/25 0936 -- -- -- 86 (!) 39 90 % --   06/11/25 0600 -- -- -- -- -- -- 70.1 kg (154 lb 8.7 oz)   06/11/25 0400 (!) 152/52 -- -- 91 (!) 37 91 % --   06/11/25 0305 (!) 148/43 98.4 °F (36.9 °C) Oral 90 (!) 48 92 % --   06/11/25 0000 (!) 145/56 -- -- 87 (!) 42 91 % --   06/10/25 2310 (!) 138/48 98.7 °F (37.1 °C) Oral 82 (!) 35 90 % --   06/10/25 2000 (!) 150/53 -- -- 94 30 92 % --   06/10/25 1915 (!) 148/52 -- -- 98 (!) 32 90 % --   06/10/25 1900 (!) 156/39 98.8 °F (37.1 °C) Oral 94 (!) 38 90 % --   06/10/25 1620 (!) 147/58 97.5 °F (36.4 °C) Oral 87 24 92 % --         Intake/Output Summary (Last 24 hours) at 6/11/2025 1524  Last data filed at 6/11/2025 0600  Gross per 24 hour   Intake 2654.79 ml   Output 1265 ml   Net 1389.79 ml        I had a face to face encounter and independently examined this patient on 6/11/2025, as outlined below:  PHYSICAL EXAM:  General: Alert, cooperative, thin build with masked facies  EENT:  EOMI.  Anicteric sclerae.  Oxygen via nasal cannula  Resp:  CTA bilaterally, no wheezing or rales.  No accessory muscle use  CV:  Regular  rhythm,  No edema.  Ejection systolic murmur right second costal space.  Apical systolic murmur.  GI:  Soft, Non distended, Non tender.  +Bowel sounds PEG tube in place  Neurologic:  Alert and oriented X 2.  Can move right upper extremity fine with tremors on movement.  Wiggle toes.  Cannot move left upper extremity.  Sensation preserved in all 4 extremities.  Psych:   Good insight. Not anxious nor agitated  Skin:  No rashes.  No jaundice    Reviewed most current lab test results and cultures  YES  Reviewed most current radiology test results   YES  Review and summation of old records today    NO  Reviewed patient's current orders and MAR    YES  PMH/SH reviewed - no change compared to H&P    Procedures: see electronic medical records for all procedures/Xrays and details which were not copied into this note but were reviewed prior to creation of Plan.      LABS:  I reviewed today's most current labs and imaging studies.  Pertinent labs include:  Recent Labs     06/09/25  0243 06/10/25  0544 06/11/25 0423   WBC 19.8* 22.6* 20.8*   HGB 10.1* 10.7* 10.1*   HCT 31.7* 33.4* 30.8*    389 359     Recent Labs     06/09/25  0243 06/10/25  0544 06/11/25  0423   * 150* 153*   K 3.0* 3.4* 3.9   * 124* 126*   CO2 23 23 20*   GLUCOSE 98 115* 117*   BUN 39* 33* 24*   CREATININE 1.11 1.31* 1.36*   CALCIUM 9.4 9.3 8.1*   MG 2.3 2.4 2.2   PHOS 2.5* 2.0* 3.1       Signed: Jim Trinh MD

## 2025-06-11 NOTE — PROGRESS NOTES
Discussed with OT who attended IDRs.  Confirmed that pt was admitted from LTC and plan is to transition to hospice services. Will complete order, not appropriate for acute PT services.

## 2025-06-11 NOTE — PLAN OF CARE
Problem: Respiratory - Adult  Goal: Achieves optimal ventilation and oxygenation  Outcome: Progressing     Problem: Neurosensory - Adult  Goal: Absence of seizures  Outcome: Progressing     Problem: Cardiovascular - Adult  Goal: Maintains optimal cardiac output and hemodynamic stability  Outcome: Progressing     Problem: Skin/Tissue Integrity - Adult  Goal: Incisions, wounds, or drain sites healing without S/S of infection  Outcome: Progressing     Problem: Genitourinary - Adult  Goal: Absence of urinary retention  Outcome: Progressing     Problem: Infection - Adult  Goal: Absence of infection during hospitalization  Outcome: Progressing     Problem: Metabolic/Fluid and Electrolytes - Adult  Goal: Hemodynamic stability and optimal renal function maintained  Outcome: Progressing     Problem: Hematologic - Adult  Goal: Maintains hematologic stability  Outcome: Progressing     Problem: SLP Adult - Impaired Swallowing  Goal: By Discharge: Advance to least restrictive diet without signs or symptoms of aspiration for planned discharge setting.  See evaluation for individualized goals.  Description: Speech Pathology Goals Initiated 6/9/25    1.  Patient will tolerate ice chips free of s/s aspiration within 7 days.  2. Added 6/10/2025 Patient will participate in clinical swallow re-assessment within 7 days   6/10/2025 1550 by Alise Dow, SLP  Outcome: Progressing

## 2025-06-11 NOTE — PROGRESS NOTES
Pulmonary Progress Note    Patient: Yogi Lynne Sr.                     YOB: 1955        Date- 6/11/2025                           Admit Date: 6/7/2025       CC: Follow up for acute respiratory failure and pneumonia    IMPRESSION & PLAN:     Acute hypoxemic respiratory failure   Bilateral pneumonia, presumed aspiration  Septic shock, resolved  A-fib with RVR  Lactic acidosis  Parkinson's disease  DNR      Plan    Acute respiratory failure, fluctuating hypoxia with recurrent aspirations, currently on 15 L oxygen.  Titrate oxygen to keep minimum sat 92%  CTA chest 6/8  bilateral pneumonia, most pronounced in RLL. Strongly suspect aspiration.   Abd CT 6/9 shows dense infiltrates in lower lungs, right more than left and infiltrates in right upper and middle lobes  Urine Legionella antigen negative, blood cultures no growth so far, on vancomycin and Zosyn, lactic acidosis and pro calcitonin improving.   Continue chest PT  Dysphagia with recurrent aspirations, on maintenance fluids.  Hematemesis, GI felt not stable for EGD  Poor prognosis, recommend comfort care    Spoke with RN.     Medical Decision Making : High complexity problems, high amount of data reviewed and moderate risk management       Subjective:    Interval history    6/9- Feels terrible.  RRT x 2 today.  Febrile and tachycardic.  O2 needs slightly improved, currently on 12 LPM.  Respiratory viral panel negative.  No oral diet due to recurrent aspirations.  GI saw for hematemesis, EGD deferred due to marked anemia.  On Amio drip for atrial arrhythmia.  6/10-Oxygen needs improved, on O2 5 LPM, no cardiac arrhythmias last night.  SLP evaluation underway.  Legionella antigen negative  6/11- patient looking very poorly.  Oxygen needs increased again, satting 89 to 90% on 15 L, nasal bleed overnight and oral bleed this morning.      HPI: Yogi Lynne is a 70 y.o.  male with PMHx significant chronic artery disease, hypertension,

## 2025-06-11 NOTE — PROGRESS NOTES
End of Shift Note    Bedside shift change report given to GUEVARA Faustin (oncoming nurse) by Cheryl Miguel RN (offgoing nurse).  Report included the following information SBAR, Kardex, Intake/Output, MAR, and Cardiac Rhythm      Shift worked:  0424-4488     Shift summary and any significant changes:     PEG fed. Target rate is 60mls/hr. Currently at 30mls. Remains NPO.   Lots of tenacious bllod tingeg oral secretions.   Concerns for physician to address:  none     Zone phone for oncoming shift:          Activity:  Level of Assistance: Dependent, patient does less than 25%  Number times ambulated in hallways past shift: 0  Number of times OOB to chair past shift: 0    Cardiac:   Cardiac Monitoring: Yes      Cardiac Rhythm: Sinus rhythm    Access:  Current line(s): PIV     Genitourinary:   Urinary Status: Voiding    Respiratory:   O2 Device: Nasal cannula  Chronic home O2 use?: NO  Incentive spirometer at bedside: N/A    GI:  Last BM (including prior to admit): 06/11/25  Current diet:  Diet NPO Exceptions are: Ice Chips  ADULT TUBE FEEDING; PEG; Standard with Fiber; Continuous; 10; Yes; 10; Q 8 hours; 60; 150; Q 4 hours  DIET ONE TIME MESSAGE;  Passing flatus: YES    Pain Management:   Patient states pain is manageable on current regimen: YES    Skin:  Conrado Scale Score: 11  Interventions: Wound Offloading (Prevention Methods): Bed, pressure reduction mattress, Elevate heels, Pillows, Repositioning, Turning    Patient Safety:  Fall Risk: Nursing Judgement-Fall Risk High(Add Comments): Yes  Fall Risk Interventions  Nursing Judgement-Fall Risk High(Add Comments): Yes  Toilet Every 2 Hours-In Advance of Need: Yes  Hourly Visual Checks: Awake, Confused  Fall Visual Posted: Armband, Socks  Room Door Open: Yes  Alarm On: Bed  Patient Moved Closer to Nursing Station: No    Active Consults:   IP CONSULT TO PALLIATIVE CARE  IP CONSULT TO PHARMACY  IP CONSULT TO CARDIOLOGY  IP CONSULT TO GI  IP CONSULT TO PULMONOLOGY  IP  CONSULT TO INFECTIOUS DISEASES  IP CONSULT TO GI  IP CONSULT TO HOSPICE    Length of Stay:  Expected LOS: 4  Actual LOS: 4    Cheryl Miguel RN

## 2025-06-11 NOTE — PROGRESS NOTES
Cardiology Progress Note  6/11/2025     Admit Date: 6/7/2025  Admit Diagnosis: SVT (supraventricular tachycardia) [I47.10]  GRACE (acute kidney injury) [N17.9]  Sepsis (HCC) [A41.9]  Severe sepsis (HCC) [A41.9, R65.20]  Aspiration pneumonia of both lungs, unspecified aspiration pneumonia type, unspecified part of lung (HCC) [J69.0]  Acute hypoxic respiratory failure ([facility]) [J96.01]  CC: none currently  Cardiologist:  [facility]   Cardiac Assessment/Plan:    1) CAD: heavy cor ca on CT 12/2024; Pt denies eval/Rx CAD.         *NQWMI (trop 2k) 6/2025 c/w demand ischemia from hypotension/AS  2) AS/AI: severe AS (mean 43); mild-mod AI 12/2024 @ [facility]: he had a outpt f/u appt there but didn't have family with him so nothing was done.  3) Atypical atrial flutter POA 6/7/25: spont to NSR. Reported SVT 1/2025 @ [facility].  4) PFO, probable on echo 12/2024  5) HTN     6) Fall; MCA CVA 12/2024, [facility]: had PEG placed.  7) Aspiration on CT @ [facility] 12/2024; & 6/2025.  8) Prior EtOH abuse  9) H/o Polio muscular atrophy; Parkinson disease  10) Resp failure/hypoxia/hypotension/aspiration/GRACE 6/2025.  11) DNR/I     Admitted after resp failure w/ marked hypoxia/hypotension; Prob recurrent aspiration; GRACE  Atrial flutter POA: to sinus tachycardia; Trop 2ks.  Cardiac meds 6/8: dilt gtt; asa; lipitor 80; lovenox 80 bid; metoprolol 12.5 bid;      Unable to take PO meds per nursing.     Rec 6/8: D/C dilt; Amio (IV until able to take PO or by PEG); Metoprolol IV if not taking PO/PEG.  Echo     Not currently a candidate for invasive cardiac testing/Rx w/ ongoing ID issues. Can have EGD if needed from cardiac standpoint (avoid hypotension w/ AS).     Echo 6/9/25:    Left Ventricle: Normal left ventricular systolic function with a visually estimated EF of 65 - 70%. EF by visual approximation is 65%. Left ventricle size is normal. Normal wall motion.    Right Ventricle: Right ventricle size is normal. Normal systolic function.    Aortic Valve:  Nebulization Q8H PRN    metoprolol (LOPRESSOR) injection 5 mg  5 mg IntraVENous Q5 Min PRN    sodium chloride flush 0.9 % injection 5-40 mL  5-40 mL IntraVENous 2 times per day    sodium chloride flush 0.9 % injection 5-40 mL  5-40 mL IntraVENous PRN    0.9 % sodium chloride infusion   IntraVENous PRN    potassium chloride (KLOR-CON M) extended release tablet 40 mEq  40 mEq Oral PRN    Or    potassium bicarb-citric acid (EFFER-K) effervescent tablet 40 mEq  40 mEq Oral PRN    Or    potassium chloride 10 mEq/100 mL IVPB (Peripheral Line)  10 mEq IntraVENous PRN    magnesium sulfate 2000 mg in 50 mL IVPB premix  2,000 mg IntraVENous PRN    ondansetron (ZOFRAN-ODT) disintegrating tablet 4 mg  4 mg Oral Q8H PRN    Or    ondansetron (ZOFRAN) injection 4 mg  4 mg IntraVENous Q6H PRN    acetaminophen (TYLENOL) tablet 650 mg  650 mg Oral Q6H PRN    Or    acetaminophen (TYLENOL) suppository 650 mg  650 mg Rectal Q6H PRN    lansoprazole (PREVACID SOLUTAB) disintegrating tablet 30 mg  30 mg Oral Daily    FLUoxetine (PROzac) 20 MG/5ML solution 20 mg  20 mg Oral Daily    folic acid (FOLVITE) tablet 1 mg  1 mg Oral Daily    sennosides-docusate sodium (SENOKOT-S) 8.6-50 MG tablet 1 tablet  1 tablet Oral Daily    piperacillin-tazobactam (ZOSYN) 3,375 mg in sodium chloride 0.9 % 50 mL IVPB (addEASE)  3,375 mg IntraVENous Q8H    vancomycin (VANCOCIN) intermittent dosing (placeholder)   Other RX Placeholder        Shashi Galeas MD

## 2025-06-11 NOTE — CARE COORDINATION
0876 -  Hospice reviewed chart and said that patient is still tolerating PO meds, is still receiving IV abx, and still has tube feeds going.  They will be in today to assess for GIP.    1415 -  Hospice notified CM that they are admitting patient to GIP.  Palliative NP verified that Rai is the patient's only sibling.      Bethany Virk, MSN, RN    Care Management  [phone]

## 2025-06-11 NOTE — PROGRESS NOTES
0700: Bedside and Verbal shift change report given to Adria Nava RN   (oncoming nurse) by GUEVARA Luna (offgoing nurse). Report included the following information Nurse Handoff Report, Index, Adult Overview, and Cardiac Rhythm ST .     0930: Respiratory called to     1730: Patient discharged and readmitted to inpatient hospice. New orders placed.    1825: Bed assigned on Oncology unit.    1851: Attempted to call report to Oncology. The night shift nurse is on the unit and will call this RN back to get report after she clocks in.    1903: TRANSFER - OUT REPORT:    Verbal report given to GUEVARA Gonzalez on Yogi Lynne .  being transferred to Oncology 3406 for routine progression of patient care       Report consisted of patient's Situation, Background, Assessment and   Recommendations(SBAR).     Information from the following report(s) Nurse Handoff Report, Index, Adult Overview, and MAR was reviewed with the receiving nurse.           Lines:   Peripheral IV 06/07/25 Posterior;Right Forearm (Active)   Site Assessment Dry;Intact 06/11/25 0300   Line Status Infusing 06/11/25 0300   Line Care Connections checked and tightened 06/11/25 0300   Phlebitis Assessment No symptoms 06/11/25 0300   Infiltration Assessment 0 06/11/25 0300   Alcohol Cap Used Yes 06/11/25 0300   Dressing Status Old drainage noted 06/11/25 0300   Dressing Type Transparent 06/11/25 0300       Peripheral IV 06/07/25 Left Cephalic (Active)   Site Assessment Clean, dry & intact 06/11/25 0300   Line Status Infusing 06/11/25 0300   Line Care Connections checked and tightened 06/11/25 0300   Phlebitis Assessment No symptoms 06/11/25 0300   Infiltration Assessment 0 06/11/25 0300   Alcohol Cap Used Yes 06/11/25 0300   Dressing Status Clean, dry & intact 06/11/25 0300   Dressing Type Transparent 06/11/25 0300        Opportunity for questions and clarification was provided.      Patient transported with:  O2 @ 6lpm

## 2025-06-12 VITALS
RESPIRATION RATE: 20 BRPM | SYSTOLIC BLOOD PRESSURE: 138 MMHG | WEIGHT: 154 LBS | TEMPERATURE: 98.4 F | DIASTOLIC BLOOD PRESSURE: 70 MMHG | HEIGHT: 70 IN | HEART RATE: 103 BPM | BODY MASS INDEX: 22.05 KG/M2 | OXYGEN SATURATION: 85 %

## 2025-06-12 PROCEDURE — 2700000000 HC OXYGEN THERAPY PER DAY

## 2025-06-12 PROCEDURE — 94760 N-INVAS EAR/PLS OXIMETRY 1: CPT

## 2025-06-12 PROCEDURE — 2500000003 HC RX 250 WO HCPCS: Performed by: INTERNAL MEDICINE

## 2025-06-12 PROCEDURE — 6360000002 HC RX W HCPCS: Performed by: INTERNAL MEDICINE

## 2025-06-12 RX ORDER — KETOROLAC TROMETHAMINE 30 MG/ML
15 INJECTION, SOLUTION INTRAMUSCULAR; INTRAVENOUS EVERY 6 HOURS PRN
Status: DISCONTINUED | OUTPATIENT
Start: 2025-06-12 | End: 2025-06-12 | Stop reason: HOSPADM

## 2025-06-12 RX ORDER — KETOROLAC TROMETHAMINE 30 MG/ML
15 INJECTION, SOLUTION INTRAMUSCULAR; INTRAVENOUS EVERY 6 HOURS
Status: DISCONTINUED | OUTPATIENT
Start: 2025-06-12 | End: 2025-06-12 | Stop reason: HOSPADM

## 2025-06-12 RX ORDER — DIAZEPAM 10 MG/2ML
10 INJECTION, SOLUTION INTRAMUSCULAR; INTRAVENOUS
Status: DISCONTINUED | OUTPATIENT
Start: 2025-06-12 | End: 2025-06-12 | Stop reason: HOSPADM

## 2025-06-12 RX ORDER — HYDROMORPHONE HYDROCHLORIDE 2 MG/ML
2 INJECTION, SOLUTION INTRAMUSCULAR; INTRAVENOUS; SUBCUTANEOUS
Status: DISCONTINUED | OUTPATIENT
Start: 2025-06-12 | End: 2025-06-12 | Stop reason: HOSPADM

## 2025-06-12 RX ADMIN — HYDROMORPHONE HYDROCHLORIDE 1 MG: 1 INJECTION, SOLUTION INTRAMUSCULAR; INTRAVENOUS; SUBCUTANEOUS at 04:32

## 2025-06-12 RX ADMIN — HYDROMORPHONE HYDROCHLORIDE 1 MG: 1 INJECTION, SOLUTION INTRAMUSCULAR; INTRAVENOUS; SUBCUTANEOUS at 08:54

## 2025-06-12 RX ADMIN — GLYCOPYRROLATE 0.2 MG: 0.2 INJECTION INTRAMUSCULAR; INTRAVENOUS at 15:03

## 2025-06-12 RX ADMIN — HYDROMORPHONE HYDROCHLORIDE 2 MG: 2 INJECTION INTRAMUSCULAR; INTRAVENOUS; SUBCUTANEOUS at 15:03

## 2025-06-12 RX ADMIN — GLYCOPYRROLATE 0.2 MG: 0.2 INJECTION INTRAMUSCULAR; INTRAVENOUS at 06:08

## 2025-06-12 RX ADMIN — DIAZEPAM 5 MG: 5 INJECTION, SOLUTION INTRAMUSCULAR; INTRAVENOUS at 06:08

## 2025-06-12 RX ADMIN — DIAZEPAM 10 MG: 5 INJECTION, SOLUTION INTRAMUSCULAR; INTRAVENOUS at 12:26

## 2025-06-12 RX ADMIN — KETOROLAC TROMETHAMINE 15 MG: 30 INJECTION, SOLUTION INTRAMUSCULAR at 12:25

## 2025-06-12 RX ADMIN — GLYCOPYRROLATE 0.2 MG: 0.2 INJECTION INTRAMUSCULAR; INTRAVENOUS at 12:25

## 2025-06-12 RX ADMIN — HYDROMORPHONE HYDROCHLORIDE 1 MG: 1 INJECTION, SOLUTION INTRAMUSCULAR; INTRAVENOUS; SUBCUTANEOUS at 03:13

## 2025-06-12 RX ADMIN — DIAZEPAM 5 MG: 5 INJECTION, SOLUTION INTRAMUSCULAR; INTRAVENOUS at 00:12

## 2025-06-12 RX ADMIN — DIAZEPAM 10 MG: 5 INJECTION, SOLUTION INTRAMUSCULAR; INTRAVENOUS at 15:03

## 2025-06-12 RX ADMIN — GLYCOPYRROLATE 0.2 MG: 0.2 INJECTION INTRAMUSCULAR; INTRAVENOUS at 03:13

## 2025-06-12 RX ADMIN — DIAZEPAM 5 MG: 5 INJECTION, SOLUTION INTRAMUSCULAR; INTRAVENOUS at 03:14

## 2025-06-12 RX ADMIN — HYDROMORPHONE HYDROCHLORIDE 1 MG: 1 INJECTION, SOLUTION INTRAMUSCULAR; INTRAVENOUS; SUBCUTANEOUS at 00:12

## 2025-06-12 RX ADMIN — HYDROMORPHONE HYDROCHLORIDE 1 MG: 1 INJECTION, SOLUTION INTRAMUSCULAR; INTRAVENOUS; SUBCUTANEOUS at 06:08

## 2025-06-12 RX ADMIN — GLYCOPYRROLATE 0.2 MG: 0.2 INJECTION INTRAMUSCULAR; INTRAVENOUS at 00:12

## 2025-06-12 RX ADMIN — DIAZEPAM 5 MG: 5 INJECTION, SOLUTION INTRAMUSCULAR; INTRAVENOUS at 04:32

## 2025-06-12 RX ADMIN — GLYCOPYRROLATE 0.2 MG: 0.2 INJECTION INTRAMUSCULAR; INTRAVENOUS at 08:55

## 2025-06-12 RX ADMIN — HYDROMORPHONE HYDROCHLORIDE 2 MG: 2 INJECTION INTRAMUSCULAR; INTRAVENOUS; SUBCUTANEOUS at 12:25

## 2025-06-12 RX ADMIN — SODIUM CHLORIDE, PRESERVATIVE FREE 10 ML: 5 INJECTION INTRAVENOUS at 08:55

## 2025-06-12 RX ADMIN — DIAZEPAM 5 MG: 5 INJECTION, SOLUTION INTRAMUSCULAR; INTRAVENOUS at 08:55

## 2025-06-12 ASSESSMENT — PAIN SCALES - GENERAL
PAINLEVEL_OUTOF10: 0
PAINLEVEL_OUTOF10: 0

## 2025-06-12 NOTE — PLAN OF CARE
Problem: Discharge Planning  Goal: Discharge to home or other facility with appropriate resources  Outcome: Progressing  Flowsheets (Taken 6/11/2025 2012)  Discharge to home or other facility with appropriate resources: Identify barriers to discharge with patient and caregiver     Problem: Safety - Adult  Goal: Free from fall injury  Outcome: Progressing     Problem: Pain  Goal: Verbalizes/displays adequate comfort level or baseline comfort level  Outcome: Progressing

## 2025-06-12 NOTE — PROGRESS NOTES
Admit Date: 6/11/2025       Subjective:   Patient is unresponsive    Scheduled Meds:   HYDROmorphone  2 mg IntraVENous Q3H    diazePAM  10 mg IntraVENous Q3H    ketorolac  15 mg IntraVENous Q6H    sodium chloride flush  5-40 mL IntraVENous 2 times per day    scopolamine  1 patch TransDERmal Q72H    glycopyrrolate  0.2 mg IntraVENous Q3H     Continuous Infusions:  PRN Meds:HYDROmorphone, diazePAM, ketorolac, acetaminophen, bisacodyl, atropine    Review of Systems  Unable to evaluate    Objective:     Patient Vitals for the past 8 hrs:   BP Temp Temp src Pulse Resp SpO2   06/12/25 0755 138/70 98.4 °F (36.9 °C) Axillary (!) 103 20 (!) 85 %   06/12/25 0500 -- -- -- -- 24 --     I/O last 3 completed shifts:  In: -   Out: 550 [Urine:550]  No intake/output data recorded.    PPS 20%--> 10%  Cardiovascular: Tachycardia  Respiratory: Tachypnea, rhonchi, increased work of breathing with use of accessory muscles   gastrointestinal: Soft hypoactive bowel sounds PEG tube present  Musculoskeletal: Musculoskeletal wasting  Skin: Mottling/cool bilateral lower extremities  Psychiatric: Unable to evaluate      Assessment:     Principal Problem:    Acute hypoxic respiratory failure (HCC)  Resolved Problems:    * No resolved hospital problems. *      Plan:     70-year-old male with past medical history significant for hypertension, polio, Parkinson's, mood disorder, coronary artery disease admitted to hospice due to acute hypoxic respiratory failure.  Patient was admitted to the hospital with acute hypoxic respiratory failure with septic shock due to aspiration pneumonia.  Patient had CTA of the chest which showed severe bilateral pneumonia greatest right lower lobe as well as pulmonary edema tracheal debris.  Patient required high flow oxygen. Patient received IV antibiotic treatment.  Patient was evaluated by pulmonary and infectious disease.  Noted no improvement of symptoms despite being on antibiotics.  Patient also had A-fib with  RVR and likely non-ST elevation MI with underlying severe aortic stenosis.  Evaluated by cardiology placed on diltiazem drip.  As well as IV beta-blocker.  Notably with electrolyte imbalances.  Overall poor prognosis decision for comfort measures.  Patient continues to meet criteria for GIP for management of dyspnea, secretions, restlessness and agitation.  Adjust Dilaudid to 2 mg IV every 3 hours and diazepam to 10 mg IV every 3 hours.  Add ketorolac 15 mg IV every 6 hours.  Continue scopolamine patch every 72 hours and glycopyrrolate 0.2 mg IV every 3 hours.  Continue as needed medication regimen.

## 2025-06-12 NOTE — PROGRESS NOTES
Nutrition: Chart reviewed due to MST/PI referrals. Patient admitted to IP hospice. Aggressive nutrition interventions and nutrition assessment not indicated. RD available by consult if needs arise. Thanks.  Tabby Ge RD

## 2025-06-12 NOTE — PROGRESS NOTES
Spiritual Health History and Assessment/Progress Note  [facility]    Initial Encounter,  ,  ,      Name: Yogi Lynne Sr. MRN: [id number]    Age: 70 y.o.     Sex: male   Language: English   Buddhist: Rastafari   Acute hypoxic respiratory failure (HCC)     Date: 6/12/2025            Total Time Calculated: 15 min              Spiritual Assessment began in [facility] MED[facility] ONCOLOGY        Referral/Consult From: Rounding   Encounter Overview/Reason: Initial Encounter  Service Provided For: Family    Lety, Belief, Meaning:   Patient unable to assess at this time  Family/Friends Other: Brother did not mention spiritual connection.      Importance and Influence:  Patient unable to assess at this time  Family/Friends Other: Brother was present made no indication of spiritual connection.    Community:  Patient Other: unable to assess  Family/Friends Other: Brother was present.  Stated that patient had no other relatives in the area.    Assessment and Plan of Care:     Patient Interventions include: Other: Unable to assess  Family/Friends Interventions include: Other: Brother present, no plan for care.    Patient Plan of Care: Spiritual Care available upon further referral  Family/Friends Plan of Care: Spiritual Care available upon further referral    Electronically signed by Natalie Raya  Intern on 6/12/2025 at 2:07 PM

## 2025-06-12 NOTE — PROGRESS NOTES
No    Active Consults:   None    Length of Stay:  Expected LOS: 5  Actual LOS: 1    Ann Ewing RN

## 2025-06-12 NOTE — CARE COORDINATION
0820 - Patient transferred to Med Onc; handoff sent to Unit CM.      Bethany Virk, MSN, RN    Care Management  [phone]

## 2025-06-12 NOTE — DISCHARGE SUMMARY
Hospice Discharge Summary    [facility]  Good Help to Those in Need        Date of Admission: 2025  Date of Discharge: 25       70-year-old male with past medical history significant for hypertension, polio, Parkinson's, mood disorder, coronary artery disease admitted to hospice due to acute hypoxic respiratory failure.  Patient was admitted to the hospital with acute hypoxic respiratory failure with septic shock due to aspiration pneumonia.  Patient had CTA of the chest which showed severe bilateral pneumonia greatest right lower lobe as well as pulmonary edema tracheal debris.  Patient required high flow oxygen. Patient received IV antibiotic treatment.  Patient was evaluated by pulmonary and infectious disease.  Noted no improvement of symptoms despite being on antibiotics.  Patient also had A-fib with RVR and likely non-ST elevation MI with underlying severe aortic stenosis.  Evaluated by cardiology placed on diltiazem drip.  As well as IV beta-blocker.  Notably with electrolyte imbalances.  Overall poor prognosis decision for comfort measures.  Patient admitted [facility] for management of dyspnea, secretions, restlessness and agitation. Patient  25

## 2025-06-12 NOTE — PROGRESS NOTES
Spiritual Health History and Assessment/Progress Note  [facility]    Grief, Loss, and Adjustments,  , Death,      Name: Yogi Lynne Sr. MRN: [id number]    Age: 70 y.o.     Sex: male   Language: English   Tenriism: Restorationist   Acute hypoxic respiratory failure (HCC)     Date: 6/12/2025            Total Time Calculated: 1 min              Spiritual Assessment began in [facility] ONCOL[facility]        Referral/Consult From: Nurse   Encounter Overview/Reason: Grief, Loss, and Adjustments  Service Provided For: Patient    Lety, Belief, Meaning:   Patient unable to assess at this time  Family/Friends No family/friends present      Importance and Influence:  Patient unable to assess at this time  Family/Friends No family/friends present    Community:  Patient Other: Unable to assess  Family/Friends No family/friends present    Assessment and Plan of Care:     Patient Interventions include: Other: Unable to assess  Family/Friends Interventions include: No family/friends present    Patient Plan of Care: Other: Unable to assess  Family/Friends Plan of Care: No family/friends present    Electronically signed by Iris Gormanlain Intern on 6/12/2025 at 5:02 PM

## 2025-06-12 NOTE — PROGRESS NOTES
Physician Pronouncement of Death    Called by nursing to pronounce patient.  Death expected: YES  Family informed: YES    Physical Exam:  No corneal reflex.  No gag reflex.  No heart sounds on auscultation.  No spontaneous breath sounds.  No withdrawal from painful stimuli.    Time of Death: 4:38 pm    Signed: Madelyn Lerma MD  6/12/2025 4:52 PM    Death summary and discharge to be completed by primary attending.

## 2025-06-17 LAB
EKG ATRIAL RATE: 178 BPM
EKG DIAGNOSIS: NORMAL
EKG Q-T INTERVAL: 246 MS
EKG Q-T INTERVAL: 252 MS
EKG Q-T INTERVAL: 308 MS
EKG QRS DURATION: 102 MS
EKG QRS DURATION: 86 MS
EKG QRS DURATION: 96 MS
EKG QTC CALCULATION (BAZETT): 429 MS
EKG QTC CALCULATION (BAZETT): 438 MS
EKG QTC CALCULATION (BAZETT): 446 MS
EKG R AXIS: -28 DEGREES
EKG R AXIS: -35 DEGREES
EKG R AXIS: -36 DEGREES
EKG T AXIS: 134 DEGREES
EKG T AXIS: 144 DEGREES
EKG T AXIS: 189 DEGREES
EKG VENTRICULAR RATE: 117 BPM
EKG VENTRICULAR RATE: 189 BPM
EKG VENTRICULAR RATE: 191 BPM
